# Patient Record
Sex: FEMALE | Race: WHITE | Employment: OTHER | URBAN - METROPOLITAN AREA
[De-identification: names, ages, dates, MRNs, and addresses within clinical notes are randomized per-mention and may not be internally consistent; named-entity substitution may affect disease eponyms.]

---

## 2017-04-19 ENCOUNTER — HOSPITAL ENCOUNTER (OUTPATIENT)
Dept: RADIOLOGY | Facility: HOSPITAL | Age: 72
Discharge: HOME | End: 2017-04-19
Attending: FAMILY MEDICINE
Payer: MEDICARE

## 2017-04-19 ENCOUNTER — OFFICE VISIT (OUTPATIENT)
Dept: FAMILY MEDICINE | Facility: CLINIC | Age: 72
End: 2017-04-19
Payer: MEDICARE

## 2017-04-19 VITALS
DIASTOLIC BLOOD PRESSURE: 72 MMHG | HEART RATE: 88 BPM | BODY MASS INDEX: 32.24 KG/M2 | WEIGHT: 200.63 LBS | OXYGEN SATURATION: 97 % | HEIGHT: 66 IN | SYSTOLIC BLOOD PRESSURE: 124 MMHG | TEMPERATURE: 98 F

## 2017-04-19 DIAGNOSIS — M94.9 DISORDER OF CARTILAGE: ICD-10-CM

## 2017-04-19 DIAGNOSIS — Z12.31 ENCOUNTER FOR SCREENING MAMMOGRAM FOR MALIGNANT NEOPLASM OF BREAST: ICD-10-CM

## 2017-04-19 DIAGNOSIS — I10 ESSENTIAL HYPERTENSION: ICD-10-CM

## 2017-04-19 DIAGNOSIS — E11.69 DM TYPE 2 WITH DIABETIC DYSLIPIDEMIA: ICD-10-CM

## 2017-04-19 DIAGNOSIS — M25.531 WRIST PAIN, ACUTE, RIGHT: ICD-10-CM

## 2017-04-19 DIAGNOSIS — Z13.820 OSTEOPOROSIS SCREENING: ICD-10-CM

## 2017-04-19 DIAGNOSIS — M25.531 WRIST PAIN, ACUTE, RIGHT: Primary | ICD-10-CM

## 2017-04-19 DIAGNOSIS — F51.01 PRIMARY INSOMNIA: ICD-10-CM

## 2017-04-19 DIAGNOSIS — E78.5 DM TYPE 2 WITH DIABETIC DYSLIPIDEMIA: ICD-10-CM

## 2017-04-19 DIAGNOSIS — Z12.39 BREAST CANCER SCREENING: ICD-10-CM

## 2017-04-19 PROCEDURE — 73100 X-RAY EXAM OF WRIST: CPT | Mod: TC,PO,RT

## 2017-04-19 PROCEDURE — 99214 OFFICE O/P EST MOD 30 MIN: CPT | Mod: 25,S$PBB,, | Performed by: FAMILY MEDICINE

## 2017-04-19 PROCEDURE — 99999 PR PBB SHADOW E&M-EST. PATIENT-LVL IV: CPT | Mod: PBBFAC,,, | Performed by: FAMILY MEDICINE

## 2017-04-19 PROCEDURE — 73100 X-RAY EXAM OF WRIST: CPT | Mod: 26,RT,, | Performed by: RADIOLOGY

## 2017-04-19 RX ORDER — ATORVASTATIN CALCIUM 80 MG/1
80 TABLET, FILM COATED ORAL DAILY
Qty: 30 TABLET | Refills: 0 | Status: SHIPPED | OUTPATIENT
Start: 2017-04-19 | End: 2017-06-03 | Stop reason: SDUPTHER

## 2017-04-19 RX ORDER — LORAZEPAM 1 MG/1
1 TABLET ORAL EVERY 6 HOURS PRN
Status: CANCELLED | OUTPATIENT
Start: 2017-04-19

## 2017-04-19 RX ORDER — METFORMIN HYDROCHLORIDE 1000 MG/1
1000 TABLET ORAL 2 TIMES DAILY WITH MEALS
Qty: 60 TABLET | Refills: 0 | Status: SHIPPED | OUTPATIENT
Start: 2017-04-19 | End: 2017-06-03 | Stop reason: SDUPTHER

## 2017-04-19 RX ORDER — ATENOLOL 100 MG/1
100 TABLET ORAL DAILY
COMMUNITY
End: 2017-10-19 | Stop reason: SDUPTHER

## 2017-04-19 RX ORDER — LORAZEPAM 1 MG/1
1 TABLET ORAL NIGHTLY
Qty: 90 TABLET | Refills: 0 | Status: SHIPPED | OUTPATIENT
Start: 2017-04-19 | End: 2017-10-19 | Stop reason: SDUPTHER

## 2017-04-19 RX ORDER — INSULIN ASPART 100 [IU]/ML
INJECTION, SOLUTION INTRAVENOUS; SUBCUTANEOUS
Qty: 1 BOX | Refills: 0 | Status: ON HOLD | OUTPATIENT
Start: 2017-04-19 | End: 2017-08-25

## 2017-04-19 RX ORDER — INSULIN ASPART 100 [IU]/ML
10 INJECTION, SOLUTION INTRAVENOUS; SUBCUTANEOUS
Status: CANCELLED | OUTPATIENT
Start: 2017-04-19

## 2017-04-19 NOTE — PROGRESS NOTES
Subjective:       Patient ID: Tamara Barriga is a 72 y.o. female.    Chief Complaint: Medication Refill    HPI Comments: 72 y old  Female here  To get establish with HTN ,Insomnia   , dlp and type 2 DM with feet neuropathy . Not  On aspirin,  She had PCv13  Last m , tadp less than 10 and maybe zoster    , exercises :  None , Opthalmology  : Seen last nov .  Needs phacoemulsification .  She doesn't check  BS, avoids meat , she al;so felt on outstretched r hand last fall , x rays were neg for fx  then  . Still with pain and limited ROM .  Takes lorazepam to sleep . Current colo, due for dexa and mammo .     Medication Refill       Review of Systems   Constitutional: Negative.    HENT: Negative.    Respiratory: Negative.    Cardiovascular: Negative.    Gastrointestinal: Negative.        Objective:      Physical Exam   Constitutional: She is oriented to person, place, and time. She appears well-developed and well-nourished. No distress.   HENT:   Head: Normocephalic and atraumatic.   Right Ear: External ear normal.   Left Ear: External ear normal.   Mouth/Throat: No oropharyngeal exudate.   Eyes: Conjunctivae and EOM are normal. Pupils are equal, round, and reactive to light. Right eye exhibits no discharge. Left eye exhibits no discharge. No scleral icterus.   Neck: Normal range of motion. Neck supple. No JVD present. No tracheal deviation present. No thyromegaly present.   Cardiovascular: Normal rate, regular rhythm and normal heart sounds.  Exam reveals no gallop and no friction rub.    No murmur heard.  Pulses:       Dorsalis pedis pulses are 2+ on the right side, and 2+ on the left side.        Posterior tibial pulses are 2+ on the right side, and 2+ on the left side.   Pulmonary/Chest: Effort normal and breath sounds normal. No stridor. No respiratory distress. She has no wheezes. She has no rales. She exhibits no tenderness.   Abdominal: Soft. Bowel sounds are normal. She exhibits no distension. There is no  tenderness. There is no rebound and no guarding.   Musculoskeletal:        Right wrist: She exhibits decreased range of motion, tenderness and bony tenderness.        Right foot: There is normal range of motion and no deformity.        Left foot: There is normal range of motion and no deformity.   Feet:   Right Foot:   Protective Sensation: 10 sites tested. 5 sites sensed.   Skin Integrity: Negative for ulcer, blister, skin breakdown, erythema, warmth or callus.   Left Foot:   Protective Sensation: 10 sites tested. 5 sites sensed.   Skin Integrity: Negative for ulcer, blister, skin breakdown, erythema or warmth.   Lymphadenopathy:     She has no cervical adenopathy.   Neurological: She is alert and oriented to person, place, and time.   Skin: Skin is warm and dry. She is not diaphoretic.   Psychiatric: She has a normal mood and affect. Her behavior is normal. Judgment and thought content normal.       Assessment:     Tamara was seen today for medication refill.    Diagnoses and all orders for this visit:    Wrist pain, acute, right  -     X-Ray Wrist 2 View Right; Future    DM type 2 with diabetic dyslipidemia  -     CBC auto differential; Future  -     Comprehensive metabolic panel; Future  -     Hemoglobin A1c; Future  -     Microalbumin/creatinine urine ratio; Future  -     Lipid panel; Future  -     Ambulatory referral to Ophthalmology    Breast cancer screening  -     Mammo Digital Screening Bilateral With CAD; Future    Osteoporosis screening  -     DXA Bone Density Spine And Hip; Future    Disorder of cartilage   -     DXA Bone Density Spine And Hip; Future    Encounter for screening mammogram for malignant neoplasm of breast   -     Mammo Digital Screening Bilateral With CAD; Future    Essential hypertension    Other orders  -     Cancel: insulin detemir (LEVEMIR FLEXTOUCH) 100 unit/mL (3 mL) SubQ InPn pen; Inject 60 Units into the skin.  -     Cancel: insulin aspart (NOVOLOG FLEXPEN) 100 unit/mL InPn pen;  Inject 10 Units into the skin 3 (three) times daily with meals.  -     Cancel: lorazepam (ATIVAN) 1 MG tablet; Take 1 tablet (1 mg total) by mouth every 6 (six) hours as needed for Anxiety.  -     atorvastatin (LIPITOR) 80 MG tablet; Take 1 tablet (80 mg total) by mouth once daily.  -     metformin (GLUCOPHAGE) 1000 MG tablet; Take 1 tablet (1,000 mg total) by mouth 2 (two) times daily with meals.  -     insulin detemir (LEVEMIR FLEXTOUCH) 100 unit/mL (3 mL) SubQ InPn pen; 75 units sq BID  -     insulin aspart (NOVOLOG FLEXPEN) 100 unit/mL InPn pen; 25 units sq  before lunch  -     lorazepam (ATIVAN) 1 MG tablet; Take 1 tablet (1 mg total) by mouth every evening.  -     Hepatitis B Vaccine (Adult) (IM)      Plan:     Tamara was seen today for medication refill.    Diagnoses and all orders for this visit:    DM type 2 with diabetic dyslipidemia    Other orders  -     Cancel: insulin detemir (LEVEMIR FLEXTOUCH) 100 unit/mL (3 mL) SubQ InPn pen; Inject 60 Units into the skin.  -     Cancel: insulin aspart (NOVOLOG FLEXPEN) 100 unit/mL InPn pen; Inject 10 Units into the skin 3 (three) times daily with meals.  -     Cancel: lorazepam (ATIVAN) 1 MG tablet; Take 1 tablet (1 mg total) by mouth every 6 (six) hours as needed for Anxiety.  -     atorvastatin (LIPITOR) 80 MG tablet; Take 1 tablet (80 mg total) by mouth once daily.  -     metformin (GLUCOPHAGE) 1000 MG tablet; Take 1 tablet (1,000 mg total) by mouth 2 (two) times daily with meals.  -     insulin detemir (LEVEMIR FLEXTOUCH) 100 unit/mL (3 mL) SubQ InPn pen; 75 units sq BID  -     insulin aspart (NOVOLOG FLEXPEN) 100 unit/mL InPn pen; 25 units sq  before lunch     Get X rays    Encouraged daily physical activity/exercise for at least 30mins if tolerated.   Weight control recommenced as always.   Discussed how lifestyle changes make biggest impact on diabetes control.   Continue home glucose monitoring once daily and keep log.   Counseling provided today about  hypoglycemia as well as about how diabetes increases risk of cardiovascular, cerebrovascular ,peripheral vascular disease and other serious health complications. He has been instructed to call if developing any new symptoms or hypoglycemia related symptoms.   See encounter for associated orders/medications.   - Lipid panel; Future      Controlled . Cont med

## 2017-05-02 ENCOUNTER — TELEPHONE (OUTPATIENT)
Dept: FAMILY MEDICINE | Facility: CLINIC | Age: 72
End: 2017-05-02

## 2017-05-02 NOTE — TELEPHONE ENCOUNTER
----- Message from Janina You sent at 5/2/2017 12:12 PM CDT -----  states that levemir inj should have been sent in for 3 mths...[phone] (home)   ..  [facility]

## 2017-06-05 RX ORDER — ATORVASTATIN CALCIUM 80 MG/1
80 TABLET, FILM COATED ORAL DAILY
Qty: 30 TABLET | Refills: 0 | Status: SHIPPED | OUTPATIENT
Start: 2017-06-05 | End: 2017-08-11 | Stop reason: SDUPTHER

## 2017-06-05 RX ORDER — METFORMIN HYDROCHLORIDE 1000 MG/1
1000 TABLET ORAL 2 TIMES DAILY WITH MEALS
Qty: 60 TABLET | Refills: 0 | Status: SHIPPED | OUTPATIENT
Start: 2017-06-05 | End: 2017-08-11 | Stop reason: SDUPTHER

## 2017-08-14 RX ORDER — METFORMIN HYDROCHLORIDE 1000 MG/1
TABLET ORAL
Qty: 60 TABLET | Refills: 0 | Status: SHIPPED | OUTPATIENT
Start: 2017-08-14 | End: 2017-10-19 | Stop reason: SDUPTHER

## 2017-08-14 RX ORDER — ATORVASTATIN CALCIUM 80 MG/1
TABLET, FILM COATED ORAL
Qty: 30 TABLET | Refills: 0 | Status: SHIPPED | OUTPATIENT
Start: 2017-08-14 | End: 2017-10-19 | Stop reason: SDUPTHER

## 2017-08-22 ENCOUNTER — HOSPITAL ENCOUNTER (INPATIENT)
Facility: HOSPITAL | Age: 72
LOS: 2 days | Discharge: HOME HEALTH | DRG: 854 | End: 2017-08-25
Attending: EMERGENCY MEDICINE | Admitting: INTERNAL MEDICINE
Payer: MEDICARE

## 2017-08-22 ENCOUNTER — HOSPITAL ENCOUNTER (OUTPATIENT)
Dept: RADIOLOGY | Facility: HOSPITAL | Age: 72
Discharge: HOME | DRG: 854 | End: 2017-08-22
Attending: FAMILY MEDICINE
Payer: MEDICARE

## 2017-08-22 ENCOUNTER — OFFICE VISIT (OUTPATIENT)
Dept: FAMILY MEDICINE | Facility: CLINIC | Age: 72
DRG: 854 | End: 2017-08-22
Payer: MEDICARE

## 2017-08-22 ENCOUNTER — TELEPHONE (OUTPATIENT)
Dept: FAMILY MEDICINE | Facility: CLINIC | Age: 72
End: 2017-08-22

## 2017-08-22 VITALS
SYSTOLIC BLOOD PRESSURE: 138 MMHG | TEMPERATURE: 99 F | OXYGEN SATURATION: 98 % | HEIGHT: 66 IN | HEART RATE: 99 BPM | WEIGHT: 193.81 LBS | DIASTOLIC BLOOD PRESSURE: 86 MMHG | BODY MASS INDEX: 31.15 KG/M2

## 2017-08-22 DIAGNOSIS — E08.40 DIABETES MELLITUS DUE TO UNDERLYING CONDITION WITH DIABETIC NEUROPATHY, WITH LONG-TERM CURRENT USE OF INSULIN: ICD-10-CM

## 2017-08-22 DIAGNOSIS — A41.9 SEPSIS: ICD-10-CM

## 2017-08-22 DIAGNOSIS — L03.311 ABDOMINAL WALL CELLULITIS: ICD-10-CM

## 2017-08-22 DIAGNOSIS — R30.0 DYSURIA: Primary | ICD-10-CM

## 2017-08-22 DIAGNOSIS — Z79.4 DIABETES MELLITUS DUE TO UNDERLYING CONDITION WITH DIABETIC NEUROPATHY, WITH LONG-TERM CURRENT USE OF INSULIN: ICD-10-CM

## 2017-08-22 DIAGNOSIS — Z79.4 TYPE 2 DIABETES MELLITUS WITH HYPERGLYCEMIA, WITH LONG-TERM CURRENT USE OF INSULIN: ICD-10-CM

## 2017-08-22 DIAGNOSIS — S92.354A CLOSED NONDISPLACED FRACTURE OF FIFTH METATARSAL BONE OF RIGHT FOOT, INITIAL ENCOUNTER: Primary | ICD-10-CM

## 2017-08-22 DIAGNOSIS — A41.9 SEPSIS, DUE TO UNSPECIFIED ORGANISM: ICD-10-CM

## 2017-08-22 DIAGNOSIS — M79.671 RIGHT FOOT PAIN: ICD-10-CM

## 2017-08-22 DIAGNOSIS — E11.65 TYPE 2 DIABETES MELLITUS WITH HYPERGLYCEMIA, UNSPECIFIED LONG TERM INSULIN USE STATUS: ICD-10-CM

## 2017-08-22 DIAGNOSIS — L02.211 ABDOMINAL WALL ABSCESS: Primary | ICD-10-CM

## 2017-08-22 DIAGNOSIS — E11.65 TYPE 2 DIABETES MELLITUS WITH HYPERGLYCEMIA, WITH LONG-TERM CURRENT USE OF INSULIN: ICD-10-CM

## 2017-08-22 LAB
BILIRUB SERPL-MCNC: ABNORMAL MG/DL
BLOOD URINE, POC: ABNORMAL
COLOR, POC UA: ABNORMAL
GLUCOSE UR QL STRIP: 1000
KETONES UR QL STRIP: ABNORMAL
LEUKOCYTE ESTERASE URINE, POC: ABNORMAL
NITRITE, POC UA: ABNORMAL
PH, POC UA: 5
PROTEIN, POC: ABNORMAL
SPECIFIC GRAVITY, POC UA: 1
UROBILINOGEN, POC UA: ABNORMAL

## 2017-08-22 PROCEDURE — 99285 EMERGENCY DEPT VISIT HI MDM: CPT | Mod: 25,27

## 2017-08-22 PROCEDURE — 1125F AMNT PAIN NOTED PAIN PRSNT: CPT | Mod: ,,, | Performed by: FAMILY MEDICINE

## 2017-08-22 PROCEDURE — 99999 PR PBB SHADOW E&M-EST. PATIENT-LVL IV: CPT | Mod: PBBFAC,,, | Performed by: FAMILY MEDICINE

## 2017-08-22 PROCEDURE — 73630 X-RAY EXAM OF FOOT: CPT | Mod: 26,RT,, | Performed by: RADIOLOGY

## 2017-08-22 PROCEDURE — 82962 GLUCOSE BLOOD TEST: CPT

## 2017-08-22 PROCEDURE — 3075F SYST BP GE 130 - 139MM HG: CPT | Mod: ,,, | Performed by: FAMILY MEDICINE

## 2017-08-22 PROCEDURE — 99214 OFFICE O/P EST MOD 30 MIN: CPT | Mod: S$PBB,,, | Performed by: FAMILY MEDICINE

## 2017-08-22 PROCEDURE — 3079F DIAST BP 80-89 MM HG: CPT | Mod: ,,, | Performed by: FAMILY MEDICINE

## 2017-08-22 PROCEDURE — 0J980ZZ DRAINAGE OF ABDOMEN SUBCUTANEOUS TISSUE AND FASCIA, OPEN APPROACH: ICD-10-PCS | Performed by: EMERGENCY MEDICINE

## 2017-08-22 PROCEDURE — 1159F MED LIST DOCD IN RCRD: CPT | Mod: ,,, | Performed by: FAMILY MEDICINE

## 2017-08-22 RX ORDER — LIDOCAINE HYDROCHLORIDE 10 MG/ML
10 INJECTION INFILTRATION; PERINEURAL
Status: COMPLETED | OUTPATIENT
Start: 2017-08-23 | End: 2017-08-23

## 2017-08-22 RX ORDER — HYDROMORPHONE HYDROCHLORIDE 2 MG/ML
1 INJECTION, SOLUTION INTRAMUSCULAR; INTRAVENOUS; SUBCUTANEOUS ONCE
Status: COMPLETED | OUTPATIENT
Start: 2017-08-23 | End: 2017-08-23

## 2017-08-22 RX ORDER — SULFAMETHOXAZOLE AND TRIMETHOPRIM 800; 160 MG/1; MG/1
1 TABLET ORAL 2 TIMES DAILY
Qty: 20 TABLET | Refills: 0 | Status: ON HOLD | OUTPATIENT
Start: 2017-08-22 | End: 2017-08-25

## 2017-08-22 RX ORDER — HYDROCODONE BITARTRATE AND ACETAMINOPHEN 5; 325 MG/1; MG/1
1 TABLET ORAL EVERY 6 HOURS PRN
Qty: 12 TABLET | Refills: 0 | Status: SHIPPED | OUTPATIENT
Start: 2017-08-22 | End: 2017-10-19 | Stop reason: ALTCHOICE

## 2017-08-23 PROBLEM — S92.501A CLOSED FRACTURE OF PHALANX OF RIGHT FIFTH TOE: Status: ACTIVE | Noted: 2017-08-23

## 2017-08-23 PROBLEM — R00.0 TACHYCARDIA: Status: ACTIVE | Noted: 2017-08-23

## 2017-08-23 PROBLEM — E11.65 TYPE 2 DIABETES MELLITUS WITH HYPERGLYCEMIA: Status: ACTIVE | Noted: 2017-08-23

## 2017-08-23 PROBLEM — A41.9 SEPSIS: Status: ACTIVE | Noted: 2017-08-23

## 2017-08-23 PROBLEM — L02.211 ABDOMINAL WALL ABSCESS: Status: ACTIVE | Noted: 2017-08-23

## 2017-08-23 LAB
ALBUMIN SERPL BCP-MCNC: 3.1 G/DL
ALP SERPL-CCNC: 112 U/L
ALT SERPL W/O P-5'-P-CCNC: 11 U/L
ANION GAP SERPL CALC-SCNC: 13 MMOL/L
AST SERPL-CCNC: 14 U/L
B-OH-BUTYR BLD STRIP-SCNC: 0.1 MMOL/L
BACTERIA #/AREA URNS HPF: NORMAL /HPF
BACTERIA UR CULT: NO GROWTH
BASOPHILS # BLD AUTO: 0.02 K/UL
BASOPHILS NFR BLD: 0.2 %
BILIRUB SERPL-MCNC: 0.6 MG/DL
BILIRUB UR QL STRIP: NEGATIVE
BUN SERPL-MCNC: 14 MG/DL
CALCIUM SERPL-MCNC: 10 MG/DL
CHLORIDE SERPL-SCNC: 100 MMOL/L
CLARITY UR: CLEAR
CO2 SERPL-SCNC: 25 MMOL/L
COLOR UR: YELLOW
CREAT SERPL-MCNC: 1 MG/DL
DIFFERENTIAL METHOD: ABNORMAL
EOSINOPHIL # BLD AUTO: 0 K/UL
EOSINOPHIL NFR BLD: 0.3 %
ERYTHROCYTE [DISTWIDTH] IN BLOOD BY AUTOMATED COUNT: 13.1 %
EST. GFR  (AFRICAN AMERICAN): >60 ML/MIN/1.73 M^2
EST. GFR  (NON AFRICAN AMERICAN): 57 ML/MIN/1.73 M^2
GLUCOSE SERPL-MCNC: 444 MG/DL
GLUCOSE UR QL STRIP: ABNORMAL
HCT VFR BLD AUTO: 38.2 %
HGB BLD-MCNC: 13 G/DL
HGB UR QL STRIP: NEGATIVE
KETONES UR QL STRIP: NEGATIVE
LACTATE SERPL-SCNC: 1.9 MMOL/L
LEUKOCYTE ESTERASE UR QL STRIP: ABNORMAL
LYMPHOCYTES # BLD AUTO: 2.7 K/UL
LYMPHOCYTES NFR BLD: 23.1 %
MCH RBC QN AUTO: 29.4 PG
MCHC RBC AUTO-ENTMCNC: 34 G/DL
MCV RBC AUTO: 86 FL
MICROSCOPIC COMMENT: NORMAL
MONOCYTES # BLD AUTO: 0.9 K/UL
MONOCYTES NFR BLD: 7.3 %
NEUTROPHILS # BLD AUTO: 8.1 K/UL
NEUTROPHILS NFR BLD: 69.1 %
NITRITE UR QL STRIP: NEGATIVE
PH UR STRIP: 6 [PH] (ref 5–8)
PLATELET # BLD AUTO: 273 K/UL
PMV BLD AUTO: 10.9 FL
POCT GLUCOSE: 325 MG/DL (ref 70–110)
POCT GLUCOSE: 389 MG/DL (ref 70–110)
POCT GLUCOSE: 395 MG/DL (ref 70–110)
POCT GLUCOSE: 399 MG/DL (ref 70–110)
POTASSIUM SERPL-SCNC: 4.1 MMOL/L
PROT SERPL-MCNC: 7 G/DL
PROT UR QL STRIP: NEGATIVE
RBC # BLD AUTO: 4.42 M/UL
RBC #/AREA URNS HPF: 1 /HPF (ref 0–4)
SODIUM SERPL-SCNC: 138 MMOL/L
SP GR UR STRIP: 1.02 (ref 1–1.03)
SQUAMOUS #/AREA URNS HPF: 3 /HPF
URN SPEC COLLECT METH UR: ABNORMAL
UROBILINOGEN UR STRIP-ACNC: NEGATIVE EU/DL
WBC # BLD AUTO: 11.71 K/UL
WBC #/AREA URNS HPF: 4 /HPF (ref 0–5)

## 2017-08-23 PROCEDURE — 96375 TX/PRO/DX INJ NEW DRUG ADDON: CPT

## 2017-08-23 PROCEDURE — 83605 ASSAY OF LACTIC ACID: CPT

## 2017-08-23 PROCEDURE — 82010 KETONE BODYS QUAN: CPT

## 2017-08-23 PROCEDURE — 87040 BLOOD CULTURE FOR BACTERIA: CPT | Mod: 59

## 2017-08-23 PROCEDURE — 11000001 HC ACUTE MED/SURG PRIVATE ROOM

## 2017-08-23 PROCEDURE — 25000003 PHARM REV CODE 250: Performed by: EMERGENCY MEDICINE

## 2017-08-23 PROCEDURE — 10060 I&D ABSCESS SIMPLE/SINGLE: CPT

## 2017-08-23 PROCEDURE — 81000 URINALYSIS NONAUTO W/SCOPE: CPT

## 2017-08-23 PROCEDURE — 25000003 PHARM REV CODE 250: Performed by: INTERNAL MEDICINE

## 2017-08-23 PROCEDURE — 63600175 PHARM REV CODE 636 W HCPCS: Performed by: EMERGENCY MEDICINE

## 2017-08-23 PROCEDURE — 96366 THER/PROPH/DIAG IV INF ADDON: CPT

## 2017-08-23 PROCEDURE — 63600175 PHARM REV CODE 636 W HCPCS: Performed by: INTERNAL MEDICINE

## 2017-08-23 PROCEDURE — 87070 CULTURE OTHR SPECIMN AEROBIC: CPT

## 2017-08-23 PROCEDURE — 80053 COMPREHEN METABOLIC PANEL: CPT

## 2017-08-23 PROCEDURE — 96365 THER/PROPH/DIAG IV INF INIT: CPT

## 2017-08-23 PROCEDURE — 85025 COMPLETE CBC W/AUTO DIFF WBC: CPT

## 2017-08-23 PROCEDURE — 96367 TX/PROPH/DG ADDL SEQ IV INF: CPT

## 2017-08-23 PROCEDURE — 99221 1ST HOSP IP/OBS SF/LOW 40: CPT | Mod: ,,, | Performed by: PHYSICIAN ASSISTANT

## 2017-08-23 PROCEDURE — 82962 GLUCOSE BLOOD TEST: CPT

## 2017-08-23 PROCEDURE — 96372 THER/PROPH/DIAG INJ SC/IM: CPT

## 2017-08-23 RX ORDER — SODIUM CHLORIDE 9 MG/ML
INJECTION, SOLUTION INTRAVENOUS CONTINUOUS
Status: DISCONTINUED | OUTPATIENT
Start: 2017-08-23 | End: 2017-08-25 | Stop reason: HOSPADM

## 2017-08-23 RX ORDER — METFORMIN HYDROCHLORIDE 500 MG/1
1000 TABLET ORAL 2 TIMES DAILY WITH MEALS
Status: DISCONTINUED | OUTPATIENT
Start: 2017-08-23 | End: 2017-08-23

## 2017-08-23 RX ORDER — CEFEPIME HYDROCHLORIDE 2 G/50ML
2 INJECTION, SOLUTION INTRAVENOUS
Status: DISCONTINUED | OUTPATIENT
Start: 2017-08-23 | End: 2017-08-25 | Stop reason: HOSPADM

## 2017-08-23 RX ORDER — LORAZEPAM 0.5 MG/1
1 TABLET ORAL NIGHTLY
Status: DISCONTINUED | OUTPATIENT
Start: 2017-08-23 | End: 2017-08-25 | Stop reason: HOSPADM

## 2017-08-23 RX ORDER — ATORVASTATIN CALCIUM 40 MG/1
80 TABLET, FILM COATED ORAL DAILY
Status: DISCONTINUED | OUTPATIENT
Start: 2017-08-23 | End: 2017-08-25 | Stop reason: HOSPADM

## 2017-08-23 RX ORDER — HYDROCHLOROTHIAZIDE 12.5 MG/1
12.5 TABLET ORAL
Status: DISCONTINUED | OUTPATIENT
Start: 2017-08-23 | End: 2017-08-23

## 2017-08-23 RX ORDER — HYDROCODONE BITARTRATE AND ACETAMINOPHEN 5; 325 MG/1; MG/1
1 TABLET ORAL EVERY 6 HOURS PRN
Status: DISCONTINUED | OUTPATIENT
Start: 2017-08-23 | End: 2017-08-23

## 2017-08-23 RX ORDER — IBUPROFEN 200 MG
16 TABLET ORAL
Status: DISCONTINUED | OUTPATIENT
Start: 2017-08-23 | End: 2017-08-25 | Stop reason: HOSPADM

## 2017-08-23 RX ORDER — GLUCAGON 1 MG
1 KIT INJECTION
Status: DISCONTINUED | OUTPATIENT
Start: 2017-08-23 | End: 2017-08-25 | Stop reason: HOSPADM

## 2017-08-23 RX ORDER — CEFEPIME HYDROCHLORIDE 2 G/50ML
2 INJECTION, SOLUTION INTRAVENOUS
Status: COMPLETED | OUTPATIENT
Start: 2017-08-23 | End: 2017-08-23

## 2017-08-23 RX ORDER — HYDROCODONE BITARTRATE AND ACETAMINOPHEN 5; 325 MG/1; MG/1
1 TABLET ORAL EVERY 4 HOURS PRN
Status: DISCONTINUED | OUTPATIENT
Start: 2017-08-23 | End: 2017-08-25 | Stop reason: HOSPADM

## 2017-08-23 RX ORDER — IBUPROFEN 200 MG
24 TABLET ORAL
Status: DISCONTINUED | OUTPATIENT
Start: 2017-08-23 | End: 2017-08-25 | Stop reason: HOSPADM

## 2017-08-23 RX ORDER — IPRATROPIUM BROMIDE AND ALBUTEROL SULFATE 2.5; .5 MG/3ML; MG/3ML
3 SOLUTION RESPIRATORY (INHALATION) EVERY 4 HOURS PRN
Status: DISCONTINUED | OUTPATIENT
Start: 2017-08-23 | End: 2017-08-25 | Stop reason: HOSPADM

## 2017-08-23 RX ORDER — HYDROCHLOROTHIAZIDE 12.5 MG/1
12.5 TABLET ORAL DAILY
Status: DISCONTINUED | OUTPATIENT
Start: 2017-08-23 | End: 2017-08-24

## 2017-08-23 RX ORDER — DULOXETIN HYDROCHLORIDE 30 MG/1
60 CAPSULE, DELAYED RELEASE ORAL DAILY
Status: DISCONTINUED | OUTPATIENT
Start: 2017-08-23 | End: 2017-08-25 | Stop reason: HOSPADM

## 2017-08-23 RX ORDER — INSULIN ASPART 100 [IU]/ML
1-10 INJECTION, SOLUTION INTRAVENOUS; SUBCUTANEOUS
Status: DISCONTINUED | OUTPATIENT
Start: 2017-08-23 | End: 2017-08-25 | Stop reason: HOSPADM

## 2017-08-23 RX ORDER — ATENOLOL 50 MG/1
100 TABLET ORAL DAILY
Status: DISCONTINUED | OUTPATIENT
Start: 2017-08-23 | End: 2017-08-25 | Stop reason: HOSPADM

## 2017-08-23 RX ORDER — OXYCODONE AND ACETAMINOPHEN 10; 325 MG/1; MG/1
1 TABLET ORAL EVERY 4 HOURS PRN
Status: DISCONTINUED | OUTPATIENT
Start: 2017-08-23 | End: 2017-08-25 | Stop reason: HOSPADM

## 2017-08-23 RX ORDER — MAG HYDROX/ALUMINUM HYD/SIMETH 200-200-20
30 SUSPENSION, ORAL (FINAL DOSE FORM) ORAL EVERY 6 HOURS PRN
Status: DISCONTINUED | OUTPATIENT
Start: 2017-08-23 | End: 2017-08-25 | Stop reason: HOSPADM

## 2017-08-23 RX ORDER — GABAPENTIN 300 MG/1
300 CAPSULE ORAL NIGHTLY
COMMUNITY
End: 2017-10-19 | Stop reason: SDUPTHER

## 2017-08-23 RX ADMIN — HYDROCHLOROTHIAZIDE 12.5 MG: 12.5 TABLET ORAL at 08:08

## 2017-08-23 RX ADMIN — INSULIN DETEMIR 75 UNITS: 100 INJECTION, SOLUTION SUBCUTANEOUS at 10:08

## 2017-08-23 RX ADMIN — SODIUM CHLORIDE: 0.9 INJECTION, SOLUTION INTRAVENOUS at 02:08

## 2017-08-23 RX ADMIN — CEFEPIME HYDROCHLORIDE 2 G: 2 INJECTION, SOLUTION INTRAVENOUS at 02:08

## 2017-08-23 RX ADMIN — HYDROMORPHONE HYDROCHLORIDE 1 MG: 2 INJECTION, SOLUTION INTRAMUSCULAR; INTRAVENOUS; SUBCUTANEOUS at 12:08

## 2017-08-23 RX ADMIN — INSULIN ASPART 4 UNITS: 100 INJECTION, SOLUTION INTRAVENOUS; SUBCUTANEOUS at 08:08

## 2017-08-23 RX ADMIN — SODIUM CHLORIDE: 0.9 INJECTION, SOLUTION INTRAVENOUS at 04:08

## 2017-08-23 RX ADMIN — VANCOMYCIN HYDROCHLORIDE 1250 MG: 100 INJECTION, POWDER, LYOPHILIZED, FOR SOLUTION INTRAVENOUS at 12:08

## 2017-08-23 RX ADMIN — LORAZEPAM 1 MG: 0.5 TABLET ORAL at 08:08

## 2017-08-23 RX ADMIN — SODIUM CHLORIDE 2500 ML: 0.9 INJECTION, SOLUTION INTRAVENOUS at 12:08

## 2017-08-23 RX ADMIN — HYDROCODONE BITARTRATE AND ACETAMINOPHEN 1 TABLET: 5; 325 TABLET ORAL at 07:08

## 2017-08-23 RX ADMIN — INSULIN DETEMIR 75 UNITS: 100 INJECTION, SOLUTION SUBCUTANEOUS at 08:08

## 2017-08-23 RX ADMIN — INSULIN ASPART 8 UNITS: 100 INJECTION, SOLUTION INTRAVENOUS; SUBCUTANEOUS at 11:08

## 2017-08-23 RX ADMIN — VANCOMYCIN HYDROCHLORIDE 1250 MG: 100 INJECTION, POWDER, LYOPHILIZED, FOR SOLUTION INTRAVENOUS at 07:08

## 2017-08-23 RX ADMIN — ATORVASTATIN CALCIUM 80 MG: 40 TABLET, FILM COATED ORAL at 08:08

## 2017-08-23 RX ADMIN — HYDROCODONE BITARTRATE AND ACETAMINOPHEN 1 TABLET: 5; 325 TABLET ORAL at 10:08

## 2017-08-23 RX ADMIN — INSULIN ASPART 6 UNITS: 100 INJECTION, SOLUTION INTRAVENOUS; SUBCUTANEOUS at 05:08

## 2017-08-23 RX ADMIN — DULOXETINE 60 MG: 30 CAPSULE, DELAYED RELEASE ORAL at 08:08

## 2017-08-23 RX ADMIN — LIDOCAINE HYDROCHLORIDE 10 ML: 10 INJECTION, SOLUTION INFILTRATION; PERINEURAL at 12:08

## 2017-08-23 RX ADMIN — INSULIN ASPART 10 UNITS: 100 INJECTION, SOLUTION INTRAVENOUS; SUBCUTANEOUS at 07:08

## 2017-08-23 RX ADMIN — ATENOLOL 100 MG: 50 TABLET ORAL at 08:08

## 2017-08-23 RX ADMIN — HYDROCODONE BITARTRATE AND ACETAMINOPHEN 1 TABLET: 5; 325 TABLET ORAL at 05:08

## 2017-08-23 NOTE — PLAN OF CARE
Pt remains free of injuries. C/o RLQ abd pain, throbbing, 2-6/10. Moderately controlled c PRN meds and relaxation techniques. Moderate amount of serosanguinous drainage from I/D site. Blood glucose monitored and insulin per SS. IV antibiotics and IVF per MD orders. 12 hr chart check completed. Will continue to monitor.

## 2017-08-23 NOTE — H&P
"[facility] Hospital Medicine  History & Physical    Patient Name: Tamara Barriga  MRN: [id number]  Admission Date: 8/22/2017  Attending Physician: Nacho Coates MD  Primary Care Provider: Primary Doctor No         Patient information was obtained from patient and ER records.     Subjective:     Principal Problem:Sepsis    Chief Complaint:   Chief Complaint   Patient presents with    Abscess     seen today by PCP. labs and ct ordered for tomorrow. reports she hasnt been able to get prescription for pain medicine or antibotics. increased pain and discomfot to right lower abd where celluitis is.         HPI: Ms. Barriga is a 73 y/o  female with h/o HTN, T2DM insulin dependant, has been suffering with a right abdominal wall redness/abscess for the past one week. She sought medical attention 2 days ago, was prescribed bactrim, but patient hasn't gotten it filled and did not take the antibiotic yet. Today she presented to the ED with fever, chills, worsening right abdominal wall swelling. I&D was performed int he ED, large amount of purulent drainage per . Wound and blood cultures obtained. Temp 101, , WBC 11.7. Patient received NS 30 ml/kg IV bolus int he ED. Lactic acid 1.0. Started on NS at 125 cc/hr. Glucose elevated in 400's, has not taken insulin in 2 days ("I ran out of my insulin"). HbA1C 10.5.    Also c/o right foot pain, XR shows acute obliquely oriented fracture involving the distal shaft/neck of the 5th metatarsal.    Admitted for sepsis due to right abdominal wall abscess. Received Vanc/Cefepime in the ED.    Past Medical History:   Diagnosis Date    Anxiety     Diabetes mellitus, type 2     Hyperlipidemia     Hypertension     Insomnia        Past Surgical History:   Procedure Laterality Date    BACK SURGERY      BREAST SURGERY      HYSTERECTOMY      LIVER BIOPSY         Review of patient's allergies indicates:  No Known Allergies    No current " facility-administered medications on file prior to encounter.      Current Outpatient Prescriptions on File Prior to Encounter   Medication Sig    atenolol (TENORMIN) 100 MG tablet Take 100 mg by mouth once daily.    atorvastatin (LIPITOR) 80 MG tablet TAKE 1 TABLET BY MOUTH EVERY DAY    duloxetine (CYMBALTA) 60 MG capsule Take 60 mg by mouth once daily.    hydrocodone-acetaminophen 5-325mg (NORCO) 5-325 mg per tablet Take 1 tablet by mouth every 6 (six) hours as needed for Pain.    insulin aspart (NOVOLOG FLEXPEN) 100 unit/mL InPn pen 25 units sq  before lunch    insulin detemir (LEVEMIR FLEXTOUCH) 100 unit/mL (3 mL) SubQ InPn pen 75 units sq BID    lorazepam (ATIVAN) 1 MG tablet Take 1 tablet (1 mg total) by mouth every evening.    metformin (GLUCOPHAGE) 1000 MG tablet TAKE 1 TABLET BY MOUTH TWICE A DAY WITH MEALS    sulfamethoxazole-trimethoprim 800-160mg (BACTRIM DS) 800-160 mg Tab Take 1 tablet by mouth 2 (two) times daily.    telmisartan-hydrochlorothiazide (MICARDIS HCT) 40-12.5 mg per tablet Take 1 tablet by mouth once daily.     Family History     Problem Relation (Age of Onset)    Cancer Daughter        Social History Main Topics    Smoking status: Never Smoker    Smokeless tobacco: Never Used    Alcohol use 0.0 - 0.6 oz/week    Drug use: No    Sexual activity: No     Review of Systems   Constitutional: Positive for chills, fatigue and fever. Negative for diaphoresis.   HENT: Negative.  Negative for congestion, nosebleeds and sinus pressure.    Eyes: Negative.  Negative for photophobia, redness and visual disturbance.   Respiratory: Negative.  Negative for cough, chest tightness, shortness of breath and wheezing.    Cardiovascular: Negative.  Negative for chest pain, palpitations and leg swelling.   Gastrointestinal: Positive for abdominal pain (right lower abdominal wall). Negative for diarrhea, nausea and vomiting.   Endocrine: Negative.  Negative for polydipsia, polyphagia and polyuria.    Genitourinary: Negative.  Negative for dysuria, flank pain, frequency and urgency.   Musculoskeletal: Negative.  Negative for back pain, joint swelling and neck stiffness.        Right distal lateral foot pain   Skin: Negative.  Negative for color change, pallor and rash.   Allergic/Immunologic: Negative.  Negative for environmental allergies, food allergies and immunocompromised state.   Neurological: Negative.  Negative for dizziness, syncope, speech difficulty, numbness and headaches.   Hematological: Negative.  Negative for adenopathy. Does not bruise/bleed easily.   Psychiatric/Behavioral: Negative.  Negative for confusion, decreased concentration and hallucinations. The patient is not nervous/anxious.    All other systems reviewed and are negative.    Objective:     Vital Signs (Most Recent):  Temp: (!) 100.5 °F (38.1 °C) (08/23/17 0428)  Pulse: 87 (08/23/17 0428)  Resp: 18 (08/23/17 0428)  BP: (!) 141/66 (08/23/17 0428)  SpO2: 98 % (08/23/17 0428) Vital Signs (24h Range):  Temp:  [98.2 °F (36.8 °C)-101.6 °F (38.7 °C)] 100.5 °F (38.1 °C)  Pulse:  [] 87  Resp:  [16-19] 18  SpO2:  [95 %-99 %] 98 %  BP: (131-179)/(56-86) 141/66     Weight: 83.9 kg (185 lb)  Body mass index is 29.86 kg/m².    Physical Exam   Constitutional: She is oriented to person, place, and time. She appears well-developed and well-nourished. No distress.   HENT:   Head: Normocephalic and atraumatic.   Eyes: Conjunctivae and EOM are normal. No scleral icterus.   Neck: Normal range of motion. Neck supple. No JVD present. No tracheal deviation present. No thyromegaly present.   Cardiovascular: Regular rhythm, normal heart sounds and intact distal pulses.  Tachycardia present.    No murmur heard.  Pulmonary/Chest: Effort normal and breath sounds normal. No respiratory distress. She has no wheezes. She has no rales. She exhibits no tenderness.   Abdominal: Soft. Bowel sounds are normal. She exhibits no distension. There is tenderness.    Large area of right lower abdominal wall induration noted, with I&D done, covered with blood soaked bandage.   Musculoskeletal: Normal range of motion. She exhibits tenderness (right little toe area). She exhibits no edema.   Lymphadenopathy:     She has no cervical adenopathy.   Neurological: She is alert and oriented to person, place, and time. No cranial nerve deficit. She exhibits normal muscle tone. Coordination normal.   Skin: Skin is warm and dry. She is not diaphoretic. No erythema.   Psychiatric: She has a normal mood and affect. Her behavior is normal. Judgment and thought content normal.   Nursing note and vitals reviewed.       Significant Labs:   Bilirubin:   Recent Labs  Lab 08/22/17  1626 08/23/17  0000   BILITOT 0.6 0.6     Blood Culture: No results for input(s): LABBLOO in the last 48 hours.  BMP:   Recent Labs  Lab 08/23/17  0000   *      K 4.1      CO2 25   BUN 14   CREATININE 1.0   CALCIUM 10.0     CBC:   Recent Labs  Lab 08/22/17  1626 08/23/17  0000   WBC 12.40 11.71   HGB 13.5 13.0   HCT 38.9 38.2    273     CMP:   Recent Labs  Lab 08/22/17  1626 08/23/17  0000    138   K 3.9 4.1   CL 99 100   CO2 28 25   * 444*   BUN 11 14   CREATININE 1.1 1.0   CALCIUM 10.3 10.0   PROT 7.6 7.0   ALBUMIN 3.4* 3.1*   BILITOT 0.6 0.6   ALKPHOS 127 112   AST 8* 14   ALT 12 11   ANIONGAP 12 13   EGFRNONAA 51.0* 57*     Cardiac Markers: No results for input(s): CKMB, MYOGLOBIN, BNP, TROPISTAT in the last 48 hours.  Lactic Acid:   Recent Labs  Lab 08/23/17  0035   LACTATE 1.9     Troponin: No results for input(s): TROPONINI in the last 48 hours.  Urine Studies:   Recent Labs  Lab 08/22/17  1637   COLORU yellow/hazy   PHUR 5   SPECGRAV 1.005   KETONESU neg   NITRITE neg   UROBILINOGEN norm     All pertinent labs within the past 24 hours have been reviewed.    Significant Imaging: I have reviewed and interpreted all pertinent imaging results/findings within the past 24 hours.      Imaging Results    None           Assessment/Plan:     * Sepsis    Temp 101,  with abdominal wall abscess  IV Vanc and IV cefepime  Wound and blood cultures  IV fluids  Monitor  Labs in AM          Abdominal wall abscess    s/p I&D in the ED.  Follow up with wound and blood cultures.  Continue IV cefepime, Vancomycin  Surgery consult          Type 2 diabetes mellitus with hyperglycemia    Uncontrolled, has not taken insulin in 2 days  HbA1C 10.5, Current BG in 400's  Resume home dose Levemir 75 units BID  Moderate dose ISS            Essential hypertension    Resume home medications.  Well controlled at this time.          Closed fracture of phalanx of right fifth toe    Supportive care  Orthopedics consult            VTE Risk Mitigation         Ordered     Place sequential compression device  Until discontinued      08/23/17 0438     Low Risk of VTE  Once      08/23/17 0426             Nacho Coates MD  Department of Hospital Medicine   [facility]

## 2017-08-23 NOTE — CONSULTS
[facility]   General Surgery  Consult Note    Patient Name: Tamara Barriga  MRN: [id number]  Code Status: Full Code  Admission Date: 8/22/2017  Hospital Length of Stay: 0 days  Attending Physician: Kiran Kimble, *  Primary Care Provider: Primary Doctor No    Patient information was obtained from patient and ER records.     Inpatient consult to General Surgery  Consult performed by: DAVE DUONG  Consult ordered by: MACARENA MILLS        Subjective:     Principal Problem: Sepsis    History of Present Illness: Tamara Barriga is a 72 y.o. type 2 diabetic female who presented to the ED last night with a large abscess of her abdominal wall. The problem started about 1 week ago and quickly worsened. She c/o warmth, firmness, and tenderness of abdominal wall, fever, chills.  I&D was done in ED.    No current facility-administered medications on file prior to encounter.      Current Outpatient Prescriptions on File Prior to Encounter   Medication Sig    atenolol (TENORMIN) 100 MG tablet Take 100 mg by mouth once daily.    atorvastatin (LIPITOR) 80 MG tablet TAKE 1 TABLET BY MOUTH EVERY DAY    duloxetine (CYMBALTA) 60 MG capsule Take 60 mg by mouth 2 (two) times daily.     insulin detemir (LEVEMIR FLEXTOUCH) 100 unit/mL (3 mL) SubQ InPn pen 75 units sq BID    lorazepam (ATIVAN) 1 MG tablet Take 1 tablet (1 mg total) by mouth every evening.    metformin (GLUCOPHAGE) 1000 MG tablet TAKE 1 TABLET BY MOUTH TWICE A DAY WITH MEALS    telmisartan-hydrochlorothiazide (MICARDIS HCT) 40-12.5 mg per tablet Take 1 tablet by mouth once daily.    hydrocodone-acetaminophen 5-325mg (NORCO) 5-325 mg per tablet Take 1 tablet by mouth every 6 (six) hours as needed for Pain.    insulin aspart (NOVOLOG FLEXPEN) 100 unit/mL InPn pen 25 units sq  before lunch    sulfamethoxazole-trimethoprim 800-160mg (BACTRIM DS) 800-160 mg Tab Take 1 tablet by mouth 2 (two) times daily.       Review of patient's  allergies indicates:  No Known Allergies    Past Medical History:   Diagnosis Date    Anxiety     Diabetes mellitus, type 2     Hyperlipidemia     Hypertension     Insomnia      Past Surgical History:   Procedure Laterality Date    BACK SURGERY      BREAST SURGERY      HYSTERECTOMY      LIVER BIOPSY       Family History     Problem Relation (Age of Onset)    Cancer Daughter        Social History Main Topics    Smoking status: Never Smoker    Smokeless tobacco: Never Used    Alcohol use 0.0 - 0.6 oz/week    Drug use: No    Sexual activity: No     Review of Systems   Constitutional: Positive for chills and fever. Negative for activity change.   Respiratory: Negative for shortness of breath.    Cardiovascular: Negative for chest pain.   Gastrointestinal: Positive for abdominal pain. Negative for nausea and vomiting.   Genitourinary: Negative for dysuria.   Musculoskeletal: Negative for myalgias.   Skin: Negative for wound.   Neurological: Negative for weakness.   Hematological: Does not bruise/bleed easily.   Psychiatric/Behavioral: The patient is not nervous/anxious.      Objective:     Vital Signs (Most Recent):  Temp: 99.4 °F (37.4 °C) (08/23/17 0834)  Pulse: 92 (08/23/17 0834)  Resp: 18 (08/23/17 0834)  BP: (!) 142/66 (08/23/17 0834)  SpO2: 96 % (08/23/17 0834) Vital Signs (24h Range):  Temp:  [98.2 °F (36.8 °C)-101.6 °F (38.7 °C)] 99.4 °F (37.4 °C)  Pulse:  [] 92  Resp:  [16-19] 18  SpO2:  [95 %-99 %] 96 %  BP: (131-179)/(56-86) 142/66     Weight: 91.4 kg (201 lb 9.6 oz)  Body mass index is 32.54 kg/m².    Physical Exam   Constitutional: She is oriented to person, place, and time. She appears well-developed.   obese   HENT:   Head: Normocephalic and atraumatic.   Eyes: EOM are normal.   Cardiovascular: Normal rate and regular rhythm.    Pulmonary/Chest: No respiratory distress.   Abdominal: Soft. There is tenderness (in area of abscess).   Right side of lower abdominal wall abscess was  examined. Cellulitis is significantly improved, almost resolved. S/p I&D with ~3cm opening and adequately drained. Foul smelling drainage is purulent and bloody. The wound was repacked with gauze.    Musculoskeletal: Normal range of motion.   Neurological: She is alert and oriented to person, place, and time.   Skin: Skin is warm and dry.   Psychiatric: She has a normal mood and affect. Thought content normal.   Vitals reviewed.      Significant Labs:  CBC:   Recent Labs  Lab 08/23/17  0000   WBC 11.71   RBC 4.42   HGB 13.0   HCT 38.2      MCV 86   MCH 29.4   MCHC 34.0     BMP:   Recent Labs  Lab 08/23/17  0000   *      K 4.1      CO2 25   BUN 14   CREATININE 1.0   CALCIUM 10.0     CMP:   Recent Labs  Lab 08/23/17  0000   *   CALCIUM 10.0   ALBUMIN 3.1*   PROT 7.0      K 4.1   CO2 25      BUN 14   CREATININE 1.0   ALKPHOS 112   ALT 11   AST 14   BILITOT 0.6       Significant Diagnostics:  I have reviewed all pertinent imaging results/findings within the past 24 hours.    Assessment/Plan:     Closed fracture of phalanx of right fifth toe    Agree with ortho/podiatry consult        Type 2 diabetes mellitus with hyperglycemia    Glucose elevated in 400's - continue management per primary team        Abdominal wall abscess    S/p I&D in ER. Appears adequately drained and is improving.   Recommend continue local wound care with damp gauze packed into wound, cover with dry gauze and secure with tape.   Consider home health upon discharge for wound care.           Essential hypertension    Continue management per primary team          VTE Risk Mitigation         Ordered     Place sequential compression device  Until discontinued      08/23/17 0438     Low Risk of VTE  Once      08/23/17 0426          Thank you for your consult. I will follow-up with patient. Please contact us if you have any additional questions.    Bernice Gale PA-C  General Surgery  [facility]

## 2017-08-23 NOTE — ASSESSMENT & PLAN NOTE
Uncontrolled, has not taken insulin in 2 days  HbA1C 10.5, Current BG in 400's  Resume home dose Levemir 75 units BID  Moderate dose ISS

## 2017-08-23 NOTE — PLAN OF CARE
CM met with patient at the bedside to assess for discharge needs.  Patient states that she lives with her son and daughter in law.  She is independent and drives herself to medical appointments.  She does anticipate that she will need home health for wound care.  CM discussed [facility] and patient does not have a preference.  Choice form completed and placed in patient blue folder.  CM provided information on pharmacy bedside delivery, information on advanced directives, and discharge planning begins on admission with contact information for SIMÓN Callaway.    BOBBY handed off to SIMÓN Callaway     08/23/17 8795   Discharge Assessment   Assessment Type Discharge Planning Assessment   Confirmed/corrected address and phone number on facesheet? Yes   Assessment information obtained from? Patient;Medical Record   Expected Length of Stay (days) (1-2)   Communicated expected length of stay with patient/caregiver yes   Prior to hospitilization cognitive status: Alert/Oriented   Prior to hospitalization functional status: Independent   Current cognitive status: Alert/Oriented   Current Functional Status: Independent   Facility Arrived From: home   Lives With child(tj), adult   Able to Return to Prior Arrangements yes   Is patient able to care for self after discharge? Yes   Who are your caregiver(s) and their phone number(s)? Panchito Kaye, son [phone]   Patient's perception of discharge disposition home health   Readmission Within The Last 30 Days no previous admission in last 30 days   Patient currently being followed by outpatient case management? No   Patient currently receives any other outside agency services? No   Equipment Currently Used at Home none   Do you have any problems affording any of your prescribed medications? No   Is the patient taking medications as prescribed? yes   Does the patient have transportation home? Yes   Transportation Available family or friend will provide   Dialysis Name and  Scheduled days NA   Does the patient receive services at the [facility]? No   Discharge Plan A Home with family;Home Health   Discharge Plan B Home with family   Patient/Family In Agreement With Plan yes

## 2017-08-23 NOTE — HPI
Tamara Barriga is a 72 y.o. type 2 diabetic female who presented to the ED last night with a large abscess of her abdominal wall. The problem started about 1 week ago and quickly worsened. She c/o warmth, firmness, and tenderness of abdominal wall, fever, chills.  I&D was done in ED.

## 2017-08-23 NOTE — SUBJECTIVE & OBJECTIVE
Past Medical History:   Diagnosis Date    Anxiety     Diabetes mellitus, type 2     Hyperlipidemia     Hypertension     Insomnia        Past Surgical History:   Procedure Laterality Date    BACK SURGERY      BREAST SURGERY      HYSTERECTOMY      LIVER BIOPSY         Review of patient's allergies indicates:  No Known Allergies    No current facility-administered medications on file prior to encounter.      Current Outpatient Prescriptions on File Prior to Encounter   Medication Sig    atenolol (TENORMIN) 100 MG tablet Take 100 mg by mouth once daily.    atorvastatin (LIPITOR) 80 MG tablet TAKE 1 TABLET BY MOUTH EVERY DAY    duloxetine (CYMBALTA) 60 MG capsule Take 60 mg by mouth once daily.    hydrocodone-acetaminophen 5-325mg (NORCO) 5-325 mg per tablet Take 1 tablet by mouth every 6 (six) hours as needed for Pain.    insulin aspart (NOVOLOG FLEXPEN) 100 unit/mL InPn pen 25 units sq  before lunch    insulin detemir (LEVEMIR FLEXTOUCH) 100 unit/mL (3 mL) SubQ InPn pen 75 units sq BID    lorazepam (ATIVAN) 1 MG tablet Take 1 tablet (1 mg total) by mouth every evening.    metformin (GLUCOPHAGE) 1000 MG tablet TAKE 1 TABLET BY MOUTH TWICE A DAY WITH MEALS    sulfamethoxazole-trimethoprim 800-160mg (BACTRIM DS) 800-160 mg Tab Take 1 tablet by mouth 2 (two) times daily.    telmisartan-hydrochlorothiazide (MICARDIS HCT) 40-12.5 mg per tablet Take 1 tablet by mouth once daily.     Family History     Problem Relation (Age of Onset)    Cancer Daughter        Social History Main Topics    Smoking status: Never Smoker    Smokeless tobacco: Never Used    Alcohol use 0.0 - 0.6 oz/week    Drug use: No    Sexual activity: No     Review of Systems   Constitutional: Positive for chills, fatigue and fever. Negative for diaphoresis.   HENT: Negative.  Negative for congestion, nosebleeds and sinus pressure.    Eyes: Negative.  Negative for photophobia, redness and visual disturbance.   Respiratory: Negative.   Negative for cough, chest tightness, shortness of breath and wheezing.    Cardiovascular: Negative.  Negative for chest pain, palpitations and leg swelling.   Gastrointestinal: Positive for abdominal pain (right lower abdominal wall). Negative for diarrhea, nausea and vomiting.   Endocrine: Negative.  Negative for polydipsia, polyphagia and polyuria.   Genitourinary: Negative.  Negative for dysuria, flank pain, frequency and urgency.   Musculoskeletal: Negative.  Negative for back pain, joint swelling and neck stiffness.        Right distal lateral foot pain   Skin: Negative.  Negative for color change, pallor and rash.   Allergic/Immunologic: Negative.  Negative for environmental allergies, food allergies and immunocompromised state.   Neurological: Negative.  Negative for dizziness, syncope, speech difficulty, numbness and headaches.   Hematological: Negative.  Negative for adenopathy. Does not bruise/bleed easily.   Psychiatric/Behavioral: Negative.  Negative for confusion, decreased concentration and hallucinations. The patient is not nervous/anxious.    All other systems reviewed and are negative.    Objective:     Vital Signs (Most Recent):  Temp: (!) 100.5 °F (38.1 °C) (08/23/17 0428)  Pulse: 87 (08/23/17 0428)  Resp: 18 (08/23/17 0428)  BP: (!) 141/66 (08/23/17 0428)  SpO2: 98 % (08/23/17 0428) Vital Signs (24h Range):  Temp:  [98.2 °F (36.8 °C)-101.6 °F (38.7 °C)] 100.5 °F (38.1 °C)  Pulse:  [] 87  Resp:  [16-19] 18  SpO2:  [95 %-99 %] 98 %  BP: (131-179)/(56-86) 141/66     Weight: 83.9 kg (185 lb)  Body mass index is 29.86 kg/m².    Physical Exam   Constitutional: She is oriented to person, place, and time. She appears well-developed and well-nourished. No distress.   HENT:   Head: Normocephalic and atraumatic.   Eyes: Conjunctivae and EOM are normal. No scleral icterus.   Neck: Normal range of motion. Neck supple. No JVD present. No tracheal deviation present. No thyromegaly present.    Cardiovascular: Regular rhythm, normal heart sounds and intact distal pulses.  Tachycardia present.    No murmur heard.  Pulmonary/Chest: Effort normal and breath sounds normal. No respiratory distress. She has no wheezes. She has no rales. She exhibits no tenderness.   Abdominal: Soft. Bowel sounds are normal. She exhibits no distension. There is tenderness.   Large area of right lower abdominal wall induration noted, with I&D done, covered with blood soaked bandage.   Musculoskeletal: Normal range of motion. She exhibits tenderness (right little toe area). She exhibits no edema.   Lymphadenopathy:     She has no cervical adenopathy.   Neurological: She is alert and oriented to person, place, and time. No cranial nerve deficit. She exhibits normal muscle tone. Coordination normal.   Skin: Skin is warm and dry. She is not diaphoretic. No erythema.   Psychiatric: She has a normal mood and affect. Her behavior is normal. Judgment and thought content normal.   Nursing note and vitals reviewed.       Significant Labs:   Bilirubin:   Recent Labs  Lab 08/22/17  1626 08/23/17  0000   BILITOT 0.6 0.6     Blood Culture: No results for input(s): LABBLOO in the last 48 hours.  BMP:   Recent Labs  Lab 08/23/17  0000   *      K 4.1      CO2 25   BUN 14   CREATININE 1.0   CALCIUM 10.0     CBC:   Recent Labs  Lab 08/22/17  1626 08/23/17  0000   WBC 12.40 11.71   HGB 13.5 13.0   HCT 38.9 38.2    273     CMP:   Recent Labs  Lab 08/22/17  1626 08/23/17  0000    138   K 3.9 4.1   CL 99 100   CO2 28 25   * 444*   BUN 11 14   CREATININE 1.1 1.0   CALCIUM 10.3 10.0   PROT 7.6 7.0   ALBUMIN 3.4* 3.1*   BILITOT 0.6 0.6   ALKPHOS 127 112   AST 8* 14   ALT 12 11   ANIONGAP 12 13   EGFRNONAA 51.0* 57*     Cardiac Markers: No results for input(s): CKMB, MYOGLOBIN, BNP, TROPISTAT in the last 48 hours.  Lactic Acid:   Recent Labs  Lab 08/23/17  0035   LACTATE 1.9     Troponin: No results for input(s):  TROPONINI in the last 48 hours.  Urine Studies:   Recent Labs  Lab 08/22/17  1637   COLORU yellow/hazy   PHUR 5   SPECGRAV 1.005   KETONESU neg   NITRITE neg   UROBILINOGEN norm     All pertinent labs within the past 24 hours have been reviewed.    Significant Imaging: I have reviewed and interpreted all pertinent imaging results/findings within the past 24 hours.     Imaging Results    None

## 2017-08-23 NOTE — SUBJECTIVE & OBJECTIVE
No current facility-administered medications on file prior to encounter.      Current Outpatient Prescriptions on File Prior to Encounter   Medication Sig    atenolol (TENORMIN) 100 MG tablet Take 100 mg by mouth once daily.    atorvastatin (LIPITOR) 80 MG tablet TAKE 1 TABLET BY MOUTH EVERY DAY    duloxetine (CYMBALTA) 60 MG capsule Take 60 mg by mouth 2 (two) times daily.     insulin detemir (LEVEMIR FLEXTOUCH) 100 unit/mL (3 mL) SubQ InPn pen 75 units sq BID    lorazepam (ATIVAN) 1 MG tablet Take 1 tablet (1 mg total) by mouth every evening.    metformin (GLUCOPHAGE) 1000 MG tablet TAKE 1 TABLET BY MOUTH TWICE A DAY WITH MEALS    telmisartan-hydrochlorothiazide (MICARDIS HCT) 40-12.5 mg per tablet Take 1 tablet by mouth once daily.    hydrocodone-acetaminophen 5-325mg (NORCO) 5-325 mg per tablet Take 1 tablet by mouth every 6 (six) hours as needed for Pain.    insulin aspart (NOVOLOG FLEXPEN) 100 unit/mL InPn pen 25 units sq  before lunch    sulfamethoxazole-trimethoprim 800-160mg (BACTRIM DS) 800-160 mg Tab Take 1 tablet by mouth 2 (two) times daily.       Review of patient's allergies indicates:  No Known Allergies    Past Medical History:   Diagnosis Date    Anxiety     Diabetes mellitus, type 2     Hyperlipidemia     Hypertension     Insomnia      Past Surgical History:   Procedure Laterality Date    BACK SURGERY      BREAST SURGERY      HYSTERECTOMY      LIVER BIOPSY       Family History     Problem Relation (Age of Onset)    Cancer Daughter        Social History Main Topics    Smoking status: Never Smoker    Smokeless tobacco: Never Used    Alcohol use 0.0 - 0.6 oz/week    Drug use: No    Sexual activity: No     Review of Systems   Constitutional: Positive for chills and fever. Negative for activity change.   Respiratory: Negative for shortness of breath.    Cardiovascular: Negative for chest pain.   Gastrointestinal: Positive for abdominal pain. Negative for nausea and vomiting.    Genitourinary: Negative for dysuria.   Musculoskeletal: Negative for myalgias.   Skin: Negative for wound.   Neurological: Negative for weakness.   Hematological: Does not bruise/bleed easily.   Psychiatric/Behavioral: The patient is not nervous/anxious.      Objective:     Vital Signs (Most Recent):  Temp: 99.4 °F (37.4 °C) (08/23/17 0834)  Pulse: 92 (08/23/17 0834)  Resp: 18 (08/23/17 0834)  BP: (!) 142/66 (08/23/17 0834)  SpO2: 96 % (08/23/17 0834) Vital Signs (24h Range):  Temp:  [98.2 °F (36.8 °C)-101.6 °F (38.7 °C)] 99.4 °F (37.4 °C)  Pulse:  [] 92  Resp:  [16-19] 18  SpO2:  [95 %-99 %] 96 %  BP: (131-179)/(56-86) 142/66     Weight: 91.4 kg (201 lb 9.6 oz)  Body mass index is 32.54 kg/m².    Physical Exam   Constitutional: She is oriented to person, place, and time. She appears well-developed.   obese   HENT:   Head: Normocephalic and atraumatic.   Eyes: EOM are normal.   Cardiovascular: Normal rate and regular rhythm.    Pulmonary/Chest: No respiratory distress.   Abdominal: Soft. There is tenderness (in area of abscess).   Right side of lower abdominal wall abscess was examined. Cellulitis is significantly improved, almost resolved. S/p I&D with ~3cm opening and adequately drained. Foul smelling drainage is purulent and bloody. The wound was repacked with gauze.    Musculoskeletal: Normal range of motion.   Neurological: She is alert and oriented to person, place, and time.   Skin: Skin is warm and dry.   Psychiatric: She has a normal mood and affect. Thought content normal.   Vitals reviewed.      Significant Labs:  CBC:   Recent Labs  Lab 08/23/17  0000   WBC 11.71   RBC 4.42   HGB 13.0   HCT 38.2      MCV 86   MCH 29.4   MCHC 34.0     BMP:   Recent Labs  Lab 08/23/17  0000   *      K 4.1      CO2 25   BUN 14   CREATININE 1.0   CALCIUM 10.0     CMP:   Recent Labs  Lab 08/23/17  0000   *   CALCIUM 10.0   ALBUMIN 3.1*   PROT 7.0      K 4.1   CO2 25       BUN 14   CREATININE 1.0   ALKPHOS 112   ALT 11   AST 14   BILITOT 0.6       Significant Diagnostics:  I have reviewed all pertinent imaging results/findings within the past 24 hours.

## 2017-08-23 NOTE — HOSPITAL COURSE
72 year old female admitted for sepsis due to abdominal cellulitis and abscess. Incidentally, she was noted to have a 5th distal metatarsal fracture from fall that occurred prior to admittance. She underwent a bedside I&D in ED. General Surgery saw the patient and felt that abscess had been adequately drained and advised local wound care and packing. Cellulitis had largely improved. Wound care has been consulted for home health management. Pt remained afebrile x 24 hours and preliminary blood/wound cultures indicated NGTD. IV antibiotics given for Vanco and Zosyn. Pt's Hgb A1C = 10.5 and diabetic nurse educator was consulted who had a lenghty discussion regarding setting goals and making manageable improvements in her diabetes care. Orthopedics saw the patient regarding foot fracture and it was recommended for pt to weight bear as tolerated, repeat xray in 7 days and soft cast shoe vs boot. Wound care nurse saw the patient and dressing changed. Pt was afebrile for greater than 24 hours. Blood cultures were NGTD and wound culture grew Streptococcus intermedius. Blood sugars were controlled. After 3 days of IV ABX, pt was discharged to home with home health services for dressing changes. She was prescribed Augmentin and Bactrim. She was advised to control blood sugars, follow up with PCP and Orthopedics. Pt was seen and examined and determined to be safe and stable for discharge.

## 2017-08-23 NOTE — ASSESSMENT & PLAN NOTE
-Temp 101,  with abdominal wall abscess and was felt to be due to underlying sepsis. Tachycardia was likely related to intravascular volume depletion from hyperglycemia (>400's)  -patient did not fail therapy as she never started Bactrim upon.   -S/p I&D on 8/22/17 removing large amount of purulent drainage.   -continue IV Vanc and IV cefepime  -Seen by General Surgery today who repacked dressing and did not rec further surgical intervention.  -Given history of noncompliance with medications, will need to continue IV antibiotics for additional day and improve glucose to ensure adequate healing.   -await wound cultures  -wound care consult in AM to re-evaluate wound and teach wound dressing.   -monitor fever curve. As Tmax 101.6

## 2017-08-23 NOTE — ASSESSMENT & PLAN NOTE
s/p I&D in the ED.  Follow up with wound and blood cultures.  Continue IV cefepime, Vancomycin  Surgery consult

## 2017-08-23 NOTE — CONSULTS
[facility]  Orthopedics  Consult Note    Patient Name: Tamara Barriga  MRN: [id number]  Admission Date: 8/22/2017  Hospital Length of Stay: 0 days  Attending Provider: Kiran Kimble, *  Primary Care Provider: CHAR BARRIOS MD    Patient information was obtained from patient and ER records.     Consults  Subjective:     Principal Problem:Sepsis    Chief Complaint:   Chief Complaint   Patient presents with    Abscess     seen today by PCP. labs and ct ordered for tomorrow. reports she hasnt been able to get prescription for pain medicine or antibotics. increased pain and discomfot to right lower abd where celluitis is.         HPI: Closed right 5th metatarsal neck fracture. She reports minimal pain. Able to weight bear. Indicates previous history of right 5th metatarsal fracture.    Past Medical History:   Diagnosis Date    Anxiety     Diabetes mellitus, type 2     Hyperlipidemia     Hypertension     Insomnia        Past Surgical History:   Procedure Laterality Date    BACK SURGERY      BREAST SURGERY      HYSTERECTOMY      LIVER BIOPSY         Review of patient's allergies indicates:  No Known Allergies    Current Facility-Administered Medications   Medication    0.9%  NaCl infusion    albuterol-ipratropium 2.5mg-0.5mg/3mL nebulizer solution 3 mL    aluminum-magnesium hydroxide-simethicone 200-200-20 mg/5 mL suspension 30 mL    atenolol tablet 100 mg    atorvastatin tablet 80 mg    ceFEPIme in dextrose 5% 2 gram/50 mL IVPB 2 g    dextrose 50% injection 12.5 g    dextrose 50% injection 25 g    duloxetine DR capsule 60 mg    glucagon (human recombinant) injection 1 mg    glucose chewable tablet 16 g    glucose chewable tablet 24 g    hydrochlorothiazide tablet 12.5 mg    hydrocodone-acetaminophen 5-325mg per tablet 1 tablet    insulin aspart pen 1-10 Units    insulin detemir pen 75 Units    lorazepam tablet 1 mg    oxycodone-acetaminophen  mg per  "tablet 1 tablet    vancomycin (VANCOCIN) 1,250 mg in dextrose 5 % 250 mL IVPB     Family History     Problem Relation (Age of Onset)    Cancer Daughter        Social History Main Topics    Smoking status: Never Smoker    Smokeless tobacco: Never Used    Alcohol use 0.0 - 0.6 oz/week    Drug use: No    Sexual activity: No     ROS  Objective:     Vital Signs (Most Recent):  Temp: 99.4 °F (37.4 °C) (08/23/17 0834)  Pulse: 92 (08/23/17 0834)  Resp: 18 (08/23/17 0834)  BP: (!) 142/66 (08/23/17 0834)  SpO2: 96 % (08/23/17 0834) Vital Signs (24h Range):  Temp:  [98.2 °F (36.8 °C)-101.6 °F (38.7 °C)] 99.4 °F (37.4 °C)  Pulse:  [] 92  Resp:  [16-19] 18  SpO2:  [95 %-99 %] 96 %  BP: (131-179)/(56-86) 142/66     Weight: 91.4 kg (201 lb 9.6 oz)  Height: 5' 6" (167.6 cm)  Body mass index is 32.54 kg/m².    No intake or output data in the 24 hours ending 08/23/17 1026    Ortho/SPM Exam   Right foot:  No gross deformity  No wounds  Mild TTP 5th metatarsal shaft  Decreased LTS  Able to flex and extend all toes  Significant Labs: All pertinent labs within the past 24 hours have been reviewed.    Significant Imaging: I have reviewed all pertinent imaging results/findings.    Assessment/Plan: Closed minimally displaced 5th metatarsal shaft/neck fracture, right foot   1. WBAT RLE  2. Patient can be either in short cam boot or cast shoe  3. Needs repeat xray in 7 days  4. Informed about risk of non union and charchot arthropathy  Active Diagnoses:    Diagnosis Date Noted POA    PRINCIPAL PROBLEM:  Sepsis [A41.9] 08/23/2017 Yes    Abdominal wall abscess [L02.211] 08/23/2017 Yes    Type 2 diabetes mellitus with hyperglycemia [E11.65] 08/23/2017 Yes    Closed fracture of phalanx of right fifth toe [S92.501A] 08/23/2017 Yes    Essential hypertension [I10] 04/19/2017 Yes      Problems Resolved During this Admission:    Diagnosis Date Noted Date Resolved POA       Thank you for your consult. I will sign off. Please contact us " if you have any additional questions.    Jeff Mason MD  Orthopedics  [facility]

## 2017-08-23 NOTE — ASSESSMENT & PLAN NOTE
Uncontrolled, has not taken insulin in 2 days  HbA1C 10.5, Current BG in 400's  Resume home dose Levemir 75 units BID  Moderate dose ISS  -thoroughly counseled on risk and complications associated with hyperglycemia  -Diabetes Educator has been consulted.

## 2017-08-23 NOTE — CONSULTS
Consult received for evaluation and treatment of patient's abdominal abscess s/p I&D.  Wound care wet to dry dressing changed per CAIT Humphrey this am.  Spoke with JEFF Valerio NP regarding care of this patient. I will plan to see and assess patient tomorrow.  Julianna Keenan RN

## 2017-08-23 NOTE — PROGRESS NOTES
Patient seen and examined today. 72 year old female admitted for sepsis due to abdominal cellulitis and abscess and right fifth toe fracture. Underwent bedside I&D in ED. Seen by General Surgery who examined wound today. Abscess has been successfully drained and cellulitis large improved. Wound care has been consulted for home health management. Fevers have improved. Awaiting wound and blood cultures. Patient has been thoroughly counseled on glucose control.

## 2017-08-23 NOTE — CONSULTS
Consult received   Chart reviewed patient assessed  She reports was diagnosed over 20 years ago and has received diabetes education in the past  She has a home glucose monitor and checks once a month with averages in the 300's.  lenghty discussion regarding setting goals and making manageable improvements in her diabetes care  She now agrees to check 2 times weekly   Recommended she alternate testing days and times to her convenience and record results for PCP visits  She is consistent with administering her insulin using the insulin pen   Verbalizes correct insulin storage and site selection/rotation  Has good recall on teach back but reviewed info on target glucose values/hyper/hypoglycemia  She acknowledges drinking one regular soft drink per day and fruit   Recommended she drink only zero-calore, diet and limit fruit intake  She verbalizes understanding    Literature provided

## 2017-08-23 NOTE — ASSESSMENT & PLAN NOTE
S/p I&D in ER. Appears adequately drained and is improving.   Recommend continue local wound care with damp gauze packed into wound, cover with dry gauze and secure with tape.   Consider home health upon discharge for wound care.

## 2017-08-23 NOTE — ASSESSMENT & PLAN NOTE
Temp 101,  with abdominal wall abscess  IV Vanc and IV cefepime  Wound and blood cultures  IV fluids  Monitor  Labs in AM

## 2017-08-23 NOTE — ED PROVIDER NOTES
SCRIBE #1 NOTE: I, Suzette Lloyd, am scribing for, and in the presence of, Aldo Tucker MD. I have scribed the entire note.      History      Chief Complaint   Patient presents with    Abscess     seen today by PCP. labs and ct ordered for tomorrow. reports she hasnt been able to get prescription for pain medicine or antibotics. increased pain and discomfot to right lower abd where celluitis is.        Review of patient's allergies indicates:  No Known Allergies     HPI   HPI    8/22/2017, 10:33 PM   History obtained from the patient      History of Present Illness: Tamara Barriga is a 72 y.o. female patient who presents to the Emergency Department for R lower abd pain from an abscess which onset gradually over the past week. Symptoms are constant and moderate in severity. Pt reports that she saw her PCP this morning and they told her that she has an infection on her tummy wall. Pt reports that she was suppose to have lab and a CT done tomorrow and she has not been able to get a prescription for pain medication. No mitigating or exacerbating factors reported. No other associated sxs reported. Patient denies any active drainage, n/v/d, HA, fever, light-headedness, dizziness and all other sxs at this time.  No further complaints or concerns at this time.         Arrival mode: Personal vehicle     PCP: Primary Doctor No       Past Medical History:  Past Medical History:   Diagnosis Date    Anxiety     Diabetes mellitus, type 2     Hyperlipidemia     Hypertension     Insomnia        Past Surgical History:  Past Surgical History:   Procedure Laterality Date    BACK SURGERY      BREAST SURGERY      HYSTERECTOMY      LIVER BIOPSY           Family History:  Family History   Problem Relation Age of Onset    Cancer Daughter      Breast Ca        Social History:  Social History     Social History Main Topics    Smoking status: Never Smoker    Smokeless tobacco: Never Used    Alcohol use 0.0 - 0.6 oz/week     Drug use: No    Sexual activity: No       ROS   Review of Systems   Constitutional: Negative for fever.   HENT: Negative for sore throat.    Respiratory: Negative for shortness of breath.    Cardiovascular: Negative for chest pain.   Gastrointestinal: Positive for abdominal pain (R lower). Negative for diarrhea, nausea and vomiting.   Genitourinary: Negative for dysuria.   Musculoskeletal: Negative for back pain.   Skin: Negative for rash.        +abscess on stomach  -active drainage     Neurological: Negative for dizziness, weakness, light-headedness and headaches.   Hematological: Does not bruise/bleed easily.   All other systems reviewed and are negative.    Physical Exam      Initial Vitals [08/22/17 2125]   BP Pulse Resp Temp SpO2   (!) 179/84 103 19 (!) 101.6 °F (38.7 °C) 96 %      MAP       115.67          Physical Exam  Nursing Notes and Vital Signs Reviewed.  Constitutional: Patient is in no apparent distress. Well-developed and well-nourished.  Head: Atraumatic. Normocephalic.  Eyes: PERRL. EOM intact. Conjunctivae are not pale. No scleral icterus.  ENT: Mucous membranes are moist. Oropharynx is clear and symmetric.    Neck: Supple. Full ROM. No lymphadenopathy.  Cardiovascular: Regular rate. Regular rhythm. No murmurs, rubs, or gallops. Distal pulses are 2+ and symmetric.  Pulmonary/Chest: No respiratory distress. Clear to auscultation bilaterally. No wheezing, rales, or rhonchi.  Abdominal: Soft and non-distended.  There is no tenderness.  No rebound, guarding, or rigidity. Good bowel sounds.  Genitourinary: No CVA tenderness  Musculoskeletal: Moves all extremities. No obvious deformities. No edema. No calf tenderness.  Skin: 3 cm abscess on abdominal wall.  Neurological:  Alert, awake, and appropriate.  Normal speech.  No acute focal neurological deficits are appreciated.  Psychiatric: Normal affect. Good eye contact. Appropriate in content.    ED Course    I & D - Incision and Drainage  Date/Time:  "8/23/2017 8:52 AM  Performed by: SELIN SANTOS  Authorized by: TRENT BLAKELY   Consent Done: Yes  Consent: Verbal consent obtained.  Risks and benefits: risks, benefits and alternatives were discussed  Consent given by: patient  Patient understanding: patient states understanding of the procedure being performed  Type: abscess  Body area: trunk  Location details: abdomen  Anesthesia: local infiltration    Anesthesia:  Local Anesthetic: lidocaine 1% without epinephrine  Scalpel size: 11  Incision type: single straight  Complexity: simple  Drainage: purulent  Drainage amount: copious  Wound treatment: wound left open and  wound packed  Packing material: 1/4 in gauze  Complications: No  Patient tolerance: Patient tolerated the procedure well with no immediate complications        ED Vital Signs:  Vitals:    08/22/17 2125 08/23/17 0050 08/23/17 0200 08/23/17 0300   BP: (!) 179/84 (!) 154/76  (!) 131/56   Pulse: 103 89  87   Resp: 19 18 18 16   Temp: (!) 101.6 °F (38.7 °C) 98.5 °F (36.9 °C)     TempSrc: Oral Oral     SpO2: 96% 99% 95% 95%   Weight: 83.9 kg (185 lb)      Height: 5' 6" (1.676 m)       08/23/17 0400 08/23/17 0428 08/23/17 0834   BP: (!) 137/57 (!) 141/66 (!) 142/66   Pulse: 80 87 92   Resp: 18 18 18   Temp: 98.2 °F (36.8 °C) (!) 100.5 °F (38.1 °C) 99.4 °F (37.4 °C)   TempSrc: Oral Axillary Oral   SpO2: 97% 98% 96%   Weight:  91.4 kg (201 lb 9.6 oz)    Height:  5' 6" (1.676 m)        Abnormal Lab Results:  Labs Reviewed   CBC W/ AUTO DIFFERENTIAL - Abnormal; Notable for the following:        Result Value    Gran # 8.1 (*)     All other components within normal limits   COMPREHENSIVE METABOLIC PANEL - Abnormal; Notable for the following:     Glucose 444 (*)     Albumin 3.1 (*)     eGFR if non  57 (*)     All other components within normal limits   POCT GLUCOSE - Abnormal; Notable for the following:     POCT Glucose 399 (*)     All other components within normal limits   POCT " GLUCOSE - Abnormal; Notable for the following:     POCT Glucose 389 (*)     All other components within normal limits   CULTURE, BLOOD   CULTURE, BLOOD   CULTURE, AEROBIC  (SPECIFY SOURCE)   LACTIC ACID, PLASMA   BETA - HYDROXYBUTYRATE, SERUM        All Lab Results:  Results for orders placed or performed during the hospital encounter of 08/22/17   CBC auto differential   Result Value Ref Range    WBC 11.71 3.90 - 12.70 K/uL    RBC 4.42 4.00 - 5.40 M/uL    Hemoglobin 13.0 12.0 - 16.0 g/dL    Hematocrit 38.2 37.0 - 48.5 %    MCV 86 82 - 98 fL    MCH 29.4 27.0 - 31.0 pg    MCHC 34.0 32.0 - 36.0 g/dL    RDW 13.1 11.5 - 14.5 %    Platelets 273 150 - 350 K/uL    MPV 10.9 9.2 - 12.9 fL    Gran # 8.1 (H) 1.8 - 7.7 K/uL    Lymph # 2.7 1.0 - 4.8 K/uL    Mono # 0.9 0.3 - 1.0 K/uL    Eos # 0.0 0.0 - 0.5 K/uL    Baso # 0.02 0.00 - 0.20 K/uL    Gran% 69.1 38.0 - 73.0 %    Lymph% 23.1 18.0 - 48.0 %    Mono% 7.3 4.0 - 15.0 %    Eosinophil% 0.3 0.0 - 8.0 %    Basophil% 0.2 0.0 - 1.9 %    Differential Method Automated    Comprehensive metabolic panel   Result Value Ref Range    Sodium 138 136 - 145 mmol/L    Potassium 4.1 3.5 - 5.1 mmol/L    Chloride 100 95 - 110 mmol/L    CO2 25 23 - 29 mmol/L    Glucose 444 (H) 70 - 110 mg/dL    BUN, Bld 14 8 - 23 mg/dL    Creatinine 1.0 0.5 - 1.4 mg/dL    Calcium 10.0 8.7 - 10.5 mg/dL    Total Protein 7.0 6.0 - 8.4 g/dL    Albumin 3.1 (L) 3.5 - 5.2 g/dL    Total Bilirubin 0.6 0.1 - 1.0 mg/dL    Alkaline Phosphatase 112 55 - 135 U/L    AST 14 10 - 40 U/L    ALT 11 10 - 44 U/L    Anion Gap 13 8 - 16 mmol/L    eGFR if African American >60 >60 mL/min/1.73 m^2    eGFR if non African American 57 (A) >60 mL/min/1.73 m^2   Lactic acid, plasma   Result Value Ref Range    Lactate (Lactic Acid) 1.9 0.5 - 2.2 mmol/L   Beta - Hydroxybutyrate, Serum   Result Value Ref Range    Beta-Hydroxybutyrate 0.1 0.0 - 0.5 mmol/L   POCT glucose   Result Value Ref Range    POCT Glucose 399 (H) 70 - 110 mg/dL   POCT glucose    Result Value Ref Range    POCT Glucose 389 (H) 70 - 110 mg/dL   POCT glucose   Result Value Ref Range    POCT Glucose 395 (H) 70 - 110 mg/dL            The Emergency Provider reviewed the vital signs and test results, which are outlined above.    ED Discussion   3:36 AM: Discussed case with Dr. Coates ([facility]). Dr. Coates agrees with current care and management of pt and accepts admission for inpatient IV antibiotics.   Admitting Service: [facility]   Admitting Physician: Dr. Coates  Admit to: In-patient Med-Surg    3:38 AM: Re-evaluated pt. I have discussed test results, shared treatment plan, and the need for admission with patient. Pt expresses understanding at this time and agrees with all information. All questions answered. Pt has no further questions or concerns at this time. Pt is ready for admit.      ED Medication(s):  Medications   atorvastatin tablet 80 mg (80 mg Oral Given 8/23/17 0840)   atenolol tablet 100 mg (100 mg Oral Given 8/23/17 0839)   duloxetine DR capsule 60 mg (60 mg Oral Given 8/23/17 0840)   lorazepam tablet 1 mg (not administered)   0.9%  NaCl infusion ( Intravenous New Bag 8/23/17 0436)   vancomycin (VANCOCIN) 1,250 mg in dextrose 5 % 250 mL IVPB (1,250 mg Intravenous Trough Due 30 minutes Before Dose 8/24/17 1145)   ceFEPIme in dextrose 5% 2 gram/50 mL IVPB 2 g (not administered)   glucose chewable tablet 16 g (not administered)   glucose chewable tablet 24 g (not administered)   dextrose 50% injection 12.5 g (not administered)   dextrose 50% injection 25 g (not administered)   glucagon (human recombinant) injection 1 mg (not administered)   insulin aspart pen 1-10 Units (10 Units Subcutaneous Given 8/23/17 0725)   hydrochlorothiazide tablet 12.5 mg (12.5 mg Oral Given 8/23/17 0840)   aluminum-magnesium hydroxide-simethicone 200-200-20 mg/5 mL suspension 30 mL (not administered)   albuterol-ipratropium 2.5mg-0.5mg/3mL nebulizer solution 3 mL (not administered)    oxycodone-acetaminophen  mg per tablet 1 tablet (not administered)   hydrocodone-acetaminophen 5-325mg per tablet 1 tablet (1 tablet Oral Given 8/23/17 0530)   insulin detemir pen 75 Units (not administered)   hydromorphone (PF) injection 1 mg (1 mg Intravenous Given 8/23/17 0003)   lidocaine HCL 10 mg/ml (1%) injection 10 mL (10 mLs Infiltration Given by Other 8/23/17 0003)   sodium chloride 0.9% bolus 2,500 mL (0 mLs Intravenous Stopped 8/23/17 0218)   vancomycin (VANCOCIN) 1,250 mg in dextrose 5 % 250 mL IVPB (0 mg/kg × 83.9 kg Intravenous Stopped 8/23/17 0218)   ceFEPIme in dextrose 5% 2 gram/50 mL IVPB 2 g (0 g Intravenous Stopped 8/23/17 0304)       Current Discharge Medication List                Medical Decision Making    Medical Decision Making:   Clinical Tests:   Lab Tests: Ordered and Reviewed           Scribe Attestation:   Scribe #1: I performed the above scribed service and the documentation accurately describes the services I performed. I attest to the accuracy of the note.    Attending:   Physician Attestation Statement for Scribe #1: I, Aldo Tucker MD, personally performed the services described in this documentation, as scribed by Suzette Desai, in my presence, and it is both accurate and complete.          Clinical Impression       ICD-10-CM ICD-9-CM   1. Abdominal wall abscess L02.211 682.2   2. Type 2 diabetes mellitus with hyperglycemia, with long-term current use of insulin E11.65 250.00    Z79.4 790.29     V58.67   3. Sepsis A41.9 038.9     995.91       Disposition:   Disposition: Admitted  Condition: Fair       Aldo Tucker MD  08/23/17 0899

## 2017-08-23 NOTE — CONSULTS
Tamara Barriga [id number] is a 72 y.o. female who has been consulted for vancomycin dosing.  Dx: Skin/soft tissue ; goal trough 12-15  The patient has the following labs:     Date Creatinine (mg/dl)    BUN WBC Count   8/23/2017 Estimated Creatinine Clearance: 57.1 mL/min (based on Cr of 1). Lab Results   Component Value Date    BUN 14 08/23/2017     Lab Results   Component Value Date    WBC 11.71 08/23/2017      which calculates to an Estimated Creatinine Clearance: 57.1 mL/min (based on Cr of 1)..       Current weight is 83.9 kg (185 lb)    The patient will be started on vancomycin at a dose of 1250 mg every 18 hours. Patient will be followed by pharmacy for changes in renal function, toxicity, and efficacy.  The vancomycin trough has been ordered for 8/24 at 11:45.      Thank you for allowing us to participate in this patient's care.     All Browne

## 2017-08-23 NOTE — ASSESSMENT & PLAN NOTE
-Orthopedic consult appreciated  -Right Lower Extremity WBAT  -Will need cast shoe  -patient will need to repeat Xray in 7 days

## 2017-08-23 NOTE — PLAN OF CARE
Problem: Patient Care Overview  Goal: Plan of Care Review  Outcome: Ongoing (interventions implemented as appropriate)  Patient arrived from ER to floor around 0430.  Abd wound dressing saturated with blood.  Gauze dressing changed; packing left intact.  CBG of 395 covered with 10 units of insulin aspart per s/s orders. Contact isolation initiated as ordered as precaution while blood cultures are pending. IVF administered as ordered. Pt. Oriented to surroundings, hourly rounding, and bedside report. Call light in reach. Bed low and wheels locked. Chart reviewed for admission orders.

## 2017-08-23 NOTE — PROGRESS NOTES
"[facility] Hospital Medicine  Progress Note    Patient Name: Tamara Barriga  MRN: [id number]  Patient Class: IP- Inpatient   Admission Date: 8/22/2017  Length of Stay: 0 days  Attending Physician: Kiran Kimble, *  Primary Care Provider: CHAR BARRIOS MD      Subjective:     Principal Problem:Sepsis    HPI:  Ms. Barriga is a 73 y/o  female with h/o HTN, T2DM insulin dependant, has been suffering with a right abdominal wall redness/abscess for the past one week. She sought medical attention 2 days ago, was prescribed bactrim, but patient hasn't gotten it filled and did not take the antibiotic yet. Today she presented to the ED with fever, chills, worsening right abdominal wall swelling. I&D was performed int he ED, large amount of purulent drainage per . Wound and blood cultures obtained. Temp 101, , WBC 11.7. Patient received NS 30 ml/kg IV bolus int he ED. Lactic acid 1.0. Started on NS at 125 cc/hr. Glucose elevated in 400's, has not taken insulin in 2 days ("I ran out of my insulin"). HbA1C 10.5.    Also c/o right foot pain, XR shows acute obliquely oriented fracture involving the distal shaft/neck of the 5th metatarsal.    Admitted for sepsis due to right abdominal wall abscess. Received Vanc/Cefepime in the ED.    Hospital Course:  72 year old female admitted for sepsis due to abdominal cellulitis and abscess and right fifth toe fracture. Underwent bedside I&D in ED. Seen by General Surgery who examined wound today. Abscess has been successfully drained and cellulitis large improved. Wound care has been consulted for home health management. Fevers have improved. Awaiting wound and blood cultures. Patient has been thoroughly counseled on glucose control.       Interval History: s/p I&D in ED. Seen by General Surgery who did not recommend further surgical intervention. Glucose remains uncontrolled. TMax 101.5. Blood cultures and wound cultures are " pending.     Review of Systems   Constitutional: Positive for chills and fever. Negative for activity change.   Respiratory: Negative for shortness of breath.    Cardiovascular: Negative for chest pain.   Gastrointestinal: Positive for abdominal pain. Negative for nausea and vomiting.   Genitourinary: Negative for dysuria.   Musculoskeletal: Negative for myalgias.   Skin: Negative for wound.   Neurological: Negative for weakness.   Hematological: Does not bruise/bleed easily.   Psychiatric/Behavioral: The patient is not nervous/anxious.      Objective:     Vital Signs (Most Recent):  Temp: 98.4 °F (36.9 °C) (08/23/17 1200)  Pulse: 71 (08/23/17 1200)  Resp: 18 (08/23/17 1200)  BP: 120/74 (08/23/17 1200)  SpO2: 95 % (08/23/17 1200) Vital Signs (24h Range):  Temp:  [98.2 °F (36.8 °C)-101.6 °F (38.7 °C)] 98.4 °F (36.9 °C)  Pulse:  [] 71  Resp:  [16-19] 18  SpO2:  [95 %-99 %] 95 %  BP: (120-179)/(56-86) 120/74     Weight: 91.4 kg (201 lb 9.6 oz)  Body mass index is 32.54 kg/m².    Intake/Output Summary (Last 24 hours) at 08/23/17 1448  Last data filed at 08/23/17 1438   Gross per 24 hour   Intake          1494.17 ml   Output              150 ml   Net          1344.17 ml      Physical Exam   Constitutional: She is oriented to person, place, and time. She appears well-developed.   obese   HENT:   Head: Normocephalic and atraumatic.   Eyes: EOM are normal.   Cardiovascular: Normal rate and regular rhythm.    Pulmonary/Chest: No respiratory distress.   Abdominal: Soft. There is tenderness (surrounding previous abscess).    S/p I&D with ~5cm dressing intact with bloody malodorous drainage. Wound was repacked today by surgery team    Musculoskeletal: Normal range of motion.   Neurological: She is alert and oriented to person, place, and time.   Skin: Skin is warm and dry.   Psychiatric: She has a normal mood and affect. Thought content normal.   Vitals reviewed.    Significant Labs:   CBC:   Recent Labs  Lab  08/22/17  1626 08/23/17  0000   WBC 12.40 11.71   HGB 13.5 13.0   HCT 38.9 38.2    273     CMP:   Recent Labs  Lab 08/22/17  1626 08/23/17  0000    138   K 3.9 4.1   CL 99 100   CO2 28 25   * 444*   BUN 11 14   CREATININE 1.1 1.0   CALCIUM 10.3 10.0   PROT 7.6 7.0   ALBUMIN 3.4* 3.1*   BILITOT 0.6 0.6   ALKPHOS 127 112   AST 8* 14   ALT 12 11   ANIONGAP 12 13   EGFRNONAA 51.0* 57*       Significant Imaging:   Imaging Results    None           Assessment/Plan:      * Abdominal Wall Cellulitis and Abscess    -Temp 101,  with abdominal wall abscess and was felt to be due to underlying sepsis. Tachycardia was likely related to intravascular volume depletion from hyperglycemia (>400's)  -patient did not fail therapy as she never started Bactrim upon.   -S/p I&D on 8/22/17 removing large amount of purulent drainage.   -continue IV Vanc and IV cefepime  -Seen by General Surgery today who repacked dressing and did not rec further surgical intervention.  -Given history of noncompliance with medications, will need to continue IV antibiotics for additional day and improve glucose to ensure adequate healing.   -await wound cultures  -wound care consult in AM to re-evaluate wound and teach wound dressing.   -monitor fever curve. As Tmax 101.6        Tachycardia      -quickly improved after IVF's.   -likely related to dehydration from hyperglycemia        Closed fracture of phalanx of right fifth toe    -Orthopedic consult appreciated  -Right Lower Extremity WBAT  -Will need cast shoe  -patient will need to repeat Xray in 7 days         Type 2 diabetes mellitus with hyperglycemia    Uncontrolled, has not taken insulin in 2 days  HbA1C 10.5, Current BG in 400's  Resume home dose Levemir 75 units BID  Moderate dose ISS  -thoroughly counseled on risk and complications associated with hyperglycemia  -Diabetes Educator has been consulted.            Abdominal wall abscess    s/p I&D in the ED.  Follow up with  wound and blood cultures.  Continue IV cefepime, Vancomycin  Surgery consult          Essential hypertension    Resume home medications.  Well controlled at this time.            VTE Risk Mitigation         Ordered     Place sequential compression device  Until discontinued      08/23/17 0438     Low Risk of VTE  Once      08/23/17 0426              Lucia Valerio NP  Department of Hospital Medicine   [facility]

## 2017-08-23 NOTE — HPI
"Ms. Barriga is a 71 y/o  female with h/o HTN, T2DM insulin dependant, has been suffering with a right abdominal wall redness/abscess for the past one week. She sought medical attention 2 days ago, was prescribed bactrim, but patient hasn't gotten it filled and did not take the antibiotic yet. Today she presented to the ED with fever, chills, worsening right abdominal wall swelling. I&D was performed int he ED, large amount of purulent drainage per . Wound and blood cultures obtained. Temp 101, , WBC 11.7. Patient received NS 30 ml/kg IV bolus int he ED. Lactic acid 1.0. Started on NS at 125 cc/hr. Glucose elevated in 400's, has not taken insulin in 2 days ("I ran out of my insulin"). HbA1C 10.5.    Also c/o right foot pain, XR shows acute obliquely oriented fracture involving the distal shaft/neck of the 5th metatarsal.    Admitted for sepsis due to right abdominal wall abscess. Received Vanc/Cefepime in the ED.  "

## 2017-08-23 NOTE — SUBJECTIVE & OBJECTIVE
Interval History: s/p I&D in ED. Seen by General Surgery who did not recommend further surgical intervention. Glucose remains uncontrolled. TMax 101.5. Blood cultures and wound cultures are pending.     Review of Systems   Constitutional: Positive for chills and fever. Negative for activity change.   Respiratory: Negative for shortness of breath.    Cardiovascular: Negative for chest pain.   Gastrointestinal: Positive for abdominal pain. Negative for nausea and vomiting.   Genitourinary: Negative for dysuria.   Musculoskeletal: Negative for myalgias.   Skin: Negative for wound.   Neurological: Negative for weakness.   Hematological: Does not bruise/bleed easily.   Psychiatric/Behavioral: The patient is not nervous/anxious.      Objective:     Vital Signs (Most Recent):  Temp: 98.4 °F (36.9 °C) (08/23/17 1200)  Pulse: 71 (08/23/17 1200)  Resp: 18 (08/23/17 1200)  BP: 120/74 (08/23/17 1200)  SpO2: 95 % (08/23/17 1200) Vital Signs (24h Range):  Temp:  [98.2 °F (36.8 °C)-101.6 °F (38.7 °C)] 98.4 °F (36.9 °C)  Pulse:  [] 71  Resp:  [16-19] 18  SpO2:  [95 %-99 %] 95 %  BP: (120-179)/(56-86) 120/74     Weight: 91.4 kg (201 lb 9.6 oz)  Body mass index is 32.54 kg/m².    Intake/Output Summary (Last 24 hours) at 08/23/17 1448  Last data filed at 08/23/17 1438   Gross per 24 hour   Intake          1494.17 ml   Output              150 ml   Net          1344.17 ml      Physical Exam   Constitutional: She is oriented to person, place, and time. She appears well-developed.   obese   HENT:   Head: Normocephalic and atraumatic.   Eyes: EOM are normal.   Cardiovascular: Normal rate and regular rhythm.    Pulmonary/Chest: No respiratory distress.   Abdominal: Soft. There is tenderness (surrounding previous abscess).    S/p I&D with ~5cm dressing intact with bloody malodorous drainage. Wound was repacked today by surgery team    Musculoskeletal: Normal range of motion.   Neurological: She is alert and oriented to person, place,  and time.   Skin: Skin is warm and dry.   Psychiatric: She has a normal mood and affect. Thought content normal.   Vitals reviewed.    Significant Labs:   CBC:   Recent Labs  Lab 08/22/17  1626 08/23/17  0000   WBC 12.40 11.71   HGB 13.5 13.0   HCT 38.9 38.2    273     CMP:   Recent Labs  Lab 08/22/17  1626 08/23/17  0000    138   K 3.9 4.1   CL 99 100   CO2 28 25   * 444*   BUN 11 14   CREATININE 1.1 1.0   CALCIUM 10.3 10.0   PROT 7.6 7.0   ALBUMIN 3.4* 3.1*   BILITOT 0.6 0.6   ALKPHOS 127 112   AST 8* 14   ALT 12 11   ANIONGAP 12 13   EGFRNONAA 51.0* 57*       Significant Imaging:   Imaging Results    None

## 2017-08-23 NOTE — ED NOTES
Pt lying in bed in NAD,VSS,RR equal and unlabored. Bed is low, locked, and call light in reach. Pt has dressing on I and D site to R Abdomen. Pt has no complaints. Side rails up x 2. Will continue to monitor pt.

## 2017-08-24 PROBLEM — E83.42 HYPOMAGNESEMIA: Status: ACTIVE | Noted: 2017-08-24

## 2017-08-24 PROBLEM — E87.6 HYPOKALEMIA: Status: ACTIVE | Noted: 2017-08-24

## 2017-08-24 PROBLEM — E83.39 HYPOPHOSPHATASIA: Status: ACTIVE | Noted: 2017-08-24

## 2017-08-24 LAB
ALBUMIN SERPL BCP-MCNC: 2.6 G/DL
ALP SERPL-CCNC: 117 U/L
ALT SERPL W/O P-5'-P-CCNC: 15 U/L
ANION GAP SERPL CALC-SCNC: 9 MMOL/L
AST SERPL-CCNC: 13 U/L
BASOPHILS # BLD AUTO: 0.01 K/UL
BASOPHILS NFR BLD: 0.2 %
BILIRUB SERPL-MCNC: 0.5 MG/DL
BUN SERPL-MCNC: 10 MG/DL
CALCIUM SERPL-MCNC: 8.6 MG/DL
CHLORIDE SERPL-SCNC: 104 MMOL/L
CO2 SERPL-SCNC: 24 MMOL/L
CREAT SERPL-MCNC: 0.8 MG/DL
DIFFERENTIAL METHOD: ABNORMAL
EOSINOPHIL # BLD AUTO: 0 K/UL
EOSINOPHIL NFR BLD: 0.4 %
ERYTHROCYTE [DISTWIDTH] IN BLOOD BY AUTOMATED COUNT: 13.1 %
EST. GFR  (AFRICAN AMERICAN): >60 ML/MIN/1.73 M^2
EST. GFR  (NON AFRICAN AMERICAN): >60 ML/MIN/1.73 M^2
GLUCOSE SERPL-MCNC: 125 MG/DL
HCT VFR BLD AUTO: 32.6 %
HGB BLD-MCNC: 11 G/DL
LYMPHOCYTES # BLD AUTO: 1.4 K/UL
LYMPHOCYTES NFR BLD: 27.6 %
MAGNESIUM SERPL-MCNC: 1.2 MG/DL
MCH RBC QN AUTO: 29.1 PG
MCHC RBC AUTO-ENTMCNC: 33.7 G/DL
MCV RBC AUTO: 86 FL
MONOCYTES # BLD AUTO: 0.4 K/UL
MONOCYTES NFR BLD: 8 %
NEUTROPHILS # BLD AUTO: 3.3 K/UL
NEUTROPHILS NFR BLD: 63.8 %
PHOSPHATE SERPL-MCNC: 2.4 MG/DL
PLATELET # BLD AUTO: 232 K/UL
PMV BLD AUTO: 10.5 FL
POCT GLUCOSE: 110 MG/DL (ref 70–110)
POCT GLUCOSE: 191 MG/DL (ref 70–110)
POCT GLUCOSE: 224 MG/DL (ref 70–110)
POCT GLUCOSE: 282 MG/DL (ref 70–110)
POCT GLUCOSE: 310 MG/DL (ref 70–110)
POTASSIUM SERPL-SCNC: 3.3 MMOL/L
PROT SERPL-MCNC: 5.9 G/DL
RBC # BLD AUTO: 3.78 M/UL
SODIUM SERPL-SCNC: 137 MMOL/L
VANCOMYCIN TROUGH SERPL-MCNC: 8 UG/ML
WBC # BLD AUTO: 5.22 K/UL

## 2017-08-24 PROCEDURE — 85025 COMPLETE CBC W/AUTO DIFF WBC: CPT

## 2017-08-24 PROCEDURE — 63600175 PHARM REV CODE 636 W HCPCS: Performed by: INTERNAL MEDICINE

## 2017-08-24 PROCEDURE — 80202 ASSAY OF VANCOMYCIN: CPT

## 2017-08-24 PROCEDURE — 63600175 PHARM REV CODE 636 W HCPCS: Performed by: EMERGENCY MEDICINE

## 2017-08-24 PROCEDURE — 83735 ASSAY OF MAGNESIUM: CPT

## 2017-08-24 PROCEDURE — 84100 ASSAY OF PHOSPHORUS: CPT

## 2017-08-24 PROCEDURE — 36415 COLL VENOUS BLD VENIPUNCTURE: CPT

## 2017-08-24 PROCEDURE — 11000001 HC ACUTE MED/SURG PRIVATE ROOM

## 2017-08-24 PROCEDURE — 63600175 PHARM REV CODE 636 W HCPCS: Performed by: NURSE PRACTITIONER

## 2017-08-24 PROCEDURE — 25000003 PHARM REV CODE 250: Performed by: EMERGENCY MEDICINE

## 2017-08-24 PROCEDURE — 25000003 PHARM REV CODE 250: Performed by: NURSE PRACTITIONER

## 2017-08-24 PROCEDURE — 63600175 PHARM REV CODE 636 W HCPCS: Performed by: PHYSICIAN ASSISTANT

## 2017-08-24 PROCEDURE — 96372 THER/PROPH/DIAG INJ SC/IM: CPT

## 2017-08-24 PROCEDURE — 80053 COMPREHEN METABOLIC PANEL: CPT

## 2017-08-24 PROCEDURE — 25000003 PHARM REV CODE 250: Performed by: INTERNAL MEDICINE

## 2017-08-24 PROCEDURE — 99231 SBSQ HOSP IP/OBS SF/LOW 25: CPT | Mod: ,,, | Performed by: PHYSICIAN ASSISTANT

## 2017-08-24 RX ORDER — MORPHINE SULFATE 2 MG/ML
1 INJECTION, SOLUTION INTRAMUSCULAR; INTRAVENOUS ONCE AS NEEDED
Status: COMPLETED | OUTPATIENT
Start: 2017-08-24 | End: 2017-08-24

## 2017-08-24 RX ORDER — MAGNESIUM SULFATE HEPTAHYDRATE 40 MG/ML
2 INJECTION, SOLUTION INTRAVENOUS ONCE
Status: COMPLETED | OUTPATIENT
Start: 2017-08-24 | End: 2017-08-24

## 2017-08-24 RX ADMIN — INSULIN DETEMIR 75 UNITS: 100 INJECTION, SOLUTION SUBCUTANEOUS at 09:08

## 2017-08-24 RX ADMIN — CEFEPIME HYDROCHLORIDE 2 G: 2 INJECTION, SOLUTION INTRAVENOUS at 02:08

## 2017-08-24 RX ADMIN — INSULIN ASPART 2 UNITS: 100 INJECTION, SOLUTION INTRAVENOUS; SUBCUTANEOUS at 11:08

## 2017-08-24 RX ADMIN — DULOXETINE 60 MG: 30 CAPSULE, DELAYED RELEASE ORAL at 08:08

## 2017-08-24 RX ADMIN — HYDROCODONE BITARTRATE AND ACETAMINOPHEN 1 TABLET: 5; 325 TABLET ORAL at 03:08

## 2017-08-24 RX ADMIN — Medication 1000 MG: at 02:08

## 2017-08-24 RX ADMIN — OXYCODONE HYDROCHLORIDE AND ACETAMINOPHEN 1 TABLET: 10; 325 TABLET ORAL at 09:08

## 2017-08-24 RX ADMIN — POTASSIUM PHOSPHATE, MONOBASIC AND POTASSIUM PHOSPHATE, DIBASIC 15 MMOL: 224; 236 INJECTION, SOLUTION INTRAVENOUS at 11:08

## 2017-08-24 RX ADMIN — LORAZEPAM 1 MG: 0.5 TABLET ORAL at 09:08

## 2017-08-24 RX ADMIN — INSULIN ASPART 4 UNITS: 100 INJECTION, SOLUTION INTRAVENOUS; SUBCUTANEOUS at 05:08

## 2017-08-24 RX ADMIN — INSULIN DETEMIR 75 UNITS: 100 INJECTION, SOLUTION SUBCUTANEOUS at 08:08

## 2017-08-24 RX ADMIN — MAGNESIUM SULFATE IN WATER 2 G: 40 INJECTION, SOLUTION INTRAVENOUS at 10:08

## 2017-08-24 RX ADMIN — HYDROCHLOROTHIAZIDE 12.5 MG: 12.5 TABLET ORAL at 08:08

## 2017-08-24 RX ADMIN — ATORVASTATIN CALCIUM 80 MG: 40 TABLET, FILM COATED ORAL at 08:08

## 2017-08-24 RX ADMIN — INSULIN ASPART 2 UNITS: 100 INJECTION, SOLUTION INTRAVENOUS; SUBCUTANEOUS at 09:08

## 2017-08-24 RX ADMIN — ATENOLOL 100 MG: 50 TABLET ORAL at 08:08

## 2017-08-24 RX ADMIN — MORPHINE SULFATE 1 MG: 2 INJECTION, SOLUTION INTRAMUSCULAR; INTRAVENOUS at 02:08

## 2017-08-24 RX ADMIN — SODIUM CHLORIDE: 0.9 INJECTION, SOLUTION INTRAVENOUS at 09:08

## 2017-08-24 NOTE — ASSESSMENT & PLAN NOTE
Fracture of right distal metatarsal  Ortho saw the patient and recommended the following  -Right Lower Extremity WBAT  -Will need cast shoe or short boot  -patient will need to repeat Xray in 7 days

## 2017-08-24 NOTE — HOSPITAL COURSE
Hospital day 2: s/p I&D abdominal wall abscess. Wound improved. Less drainage and less odor. Cellulitis improved

## 2017-08-24 NOTE — PROGRESS NOTES
Vancomycin Progress Notes:    Scr = 0.8  improved from 1 yesterday   tmax = 99.1 F   wbc = 5.22  Trough = 8 @ 16 hrs after last dose and prior to the 3rd dose; increasing freq to q12hrs and changing dose to 1 gm  Based on pt adjusted wt of 72.2 kg should be getting 1 gm  Next trough due 8/26 @ 0100  /Hannah Aguilar [facility] 8/24/2017 1:07 PM

## 2017-08-24 NOTE — ASSESSMENT & PLAN NOTE
Uncontrolled, has not taken insulin in 2 days  HbA1C 10.5, Current BG in 400's  Resume home dose Levemir 75 units BID  Moderate dose ISS  -thoroughly counseled on risk and complications associated with hyperglycemia  -Diabetes Educator was consulted who reiterated same education  Hyperglycemia decreasing

## 2017-08-24 NOTE — PLAN OF CARE
Pt remains free of injuries. C/o RLQ abd pain, throbbing, 2-6/10. Worse c wound care and movement. Moderately controlled c PRN meds and relaxation techniques. Moderate amount of serosanguinous drainage from I/D site. Dressing changed by ALFONSO Lopes-CHARAN. Blood glucose monitored and insulin per SS. IV antibiotics and IVF per MD orders. Plan to d/c home c  for wound care. 12 hr chart check completed. Will continue to monitor.

## 2017-08-24 NOTE — PROGRESS NOTES
[facility] -   General Surgery  Progress Note    Subjective:     History of Present Illness:  Tamara Barriga is a 72 y.o. type 2 diabetic female who presented to the ED last night with a large abscess of her abdominal wall. The problem started about 1 week ago and quickly worsened. She c/o warmth, firmness, and tenderness of abdominal wall, fever, chills.  I&D was done in ED.    Post-Op Info:  * No surgery found *         Interval History: no issues    Medications:  Continuous Infusions:   sodium chloride 0.9% 125 mL/hr at 08/23/17 1441     Scheduled Meds:   atenolol  100 mg Oral Daily    atorvastatin  80 mg Oral Daily    ceFEPime (MAXIPIME) IVPB  2 g Intravenous Q12H    duloxetine  60 mg Oral Daily    insulin detemir  75 Units Subcutaneous BID    lorazepam  1 mg Oral QHS    magnesium sulfate IVPB  2 g Intravenous Once    potassium phosphate IVPB  15 mmol Intravenous Once    vancomycin (VANCOCIN) IVPB  15 mg/kg Intravenous Q18H     PRN Meds:albuterol-ipratropium 2.5mg-0.5mg/3mL, aluminum-magnesium hydroxide-simethicone, dextrose 50%, dextrose 50%, glucagon (human recombinant), glucose, glucose, hydrocodone-acetaminophen 5-325mg, insulin aspart, morphine, oxycodone-acetaminophen     Review of patient's allergies indicates:  No Known Allergies  Objective:     Vital Signs (Most Recent):  Temp: 97.8 °F (36.6 °C) (08/24/17 0851)  Pulse: 83 (08/24/17 0851)  Resp: 18 (08/24/17 0851)  BP: (!) 168/74 (08/24/17 0851)  SpO2: 98 % (08/24/17 0851) Vital Signs (24h Range):  Temp:  [97.8 °F (36.6 °C)-99.1 °F (37.3 °C)] 97.8 °F (36.6 °C)  Pulse:  [71-88] 83  Resp:  [18] 18  SpO2:  [94 %-98 %] 98 %  BP: (120-168)/(67-88) 168/74     Weight: 91.4 kg (201 lb 9.6 oz)  Body mass index is 32.54 kg/m².    Intake/Output - Last 3 Shifts       08/22 0700 - 08/23 0659 08/23 0700 - 08/24 0659 08/24 0700 - 08/25 0659    P.O.  600     I.V. (mL/kg)  3175 (34.7)     IV Piggyback  350     Total Intake(mL/kg)  4125 (45.1)      Urine (mL/kg/hr)  150 (0.1)     Stool  0 (0)     Total Output   150      Net   +3975             Urine Occurrence  3 x     Stool Occurrence  0 x           Physical Exam   Constitutional: She appears well-developed and well-nourished.   HENT:   Head: Normocephalic and atraumatic.   Eyes: EOM are normal.   Cardiovascular: Normal rate and regular rhythm.    Pulmonary/Chest: Effort normal. No respiratory distress.   Abdominal: Soft.   Abscess improved with less drainage and less odor. Cellulitis improved.   Musculoskeletal: Normal range of motion.   Skin: Skin is warm and dry.   Psychiatric: She has a normal mood and affect. Thought content normal.   Vitals reviewed.      Significant Labs:  CBC:   Recent Labs  Lab 08/24/17 0432   WBC 5.22   RBC 3.78*   HGB 11.0*   HCT 32.6*      MCV 86   MCH 29.1   MCHC 33.7     BMP:   Recent Labs  Lab 08/24/17 0432   *      K 3.3*      CO2 24   BUN 10   CREATININE 0.8   CALCIUM 8.6*   MG 1.2*     CMP:   Recent Labs  Lab 08/24/17 0432   *   CALCIUM 8.6*   ALBUMIN 2.6*   PROT 5.9*      K 3.3*   CO2 24      BUN 10   CREATININE 0.8   ALKPHOS 117   ALT 15   AST 13   BILITOT 0.5       Significant Diagnostics:  I have reviewed all pertinent imaging results/findings within the past 24 hours.    Assessment/Plan:     Closed fracture of phalanx of right fifth toe    Agree with ortho/podiatry consult        Type 2 diabetes mellitus with hyperglycemia    Glucose improved- continue management per primary team        Abdominal wall abscess    S/p I&D in ER. Appears adequately drained and is improving.   Recommend continue local wound care with damp gauze packed into wound, cover with dry gauze and secure with tape.   Consider home health upon discharge for wound care.           Essential hypertension    Continue management per primary team        * Abdominal Wall Cellulitis and Abscess    Adequately drained abscess   Continue wound care per nurse or  wound care nurse.   Home health referral for wound care              Bernice Gale PA-C  General Surgery  [facility]

## 2017-08-24 NOTE — PROGRESS NOTES
"[facility] Hospital Medicine  Progress Note    Patient Name: Tamara Barriga  MRN: [id number]  Patient Class: IP- Inpatient   Admission Date: 8/22/2017  Length of Stay: 1 days  Attending Physician: Kiran Kimble, *  Primary Care Provider: CHAR BARRIOS MD        Subjective:     Principal Problem:Sepsis    HPI:  Ms. Barriga is a 71 y/o  female with h/o HTN, T2DM insulin dependant, has been suffering with a right abdominal wall redness/abscess for the past one week. She sought medical attention 2 days ago, was prescribed bactrim, but patient hasn't gotten it filled and did not take the antibiotic yet. Today she presented to the ED with fever, chills, worsening right abdominal wall swelling. I&D was performed int he ED, large amount of purulent drainage per . Wound and blood cultures obtained. Temp 101, , WBC 11.7. Patient received NS 30 ml/kg IV bolus int he ED. Lactic acid 1.0. Started on NS at 125 cc/hr. Glucose elevated in 400's, has not taken insulin in 2 days ("I ran out of my insulin"). HbA1C 10.5.    Also c/o right foot pain, XR shows acute obliquely oriented fracture involving the distal shaft/neck of the 5th metatarsal.    Admitted for sepsis due to right abdominal wall abscess. Received Vanc/Cefepime in the ED.    Hospital Course:  72 year old female admitted for sepsis due to abdominal cellulitis and abscess. Incidentally, she was noted to have a 5th distal metatarsal fracture from fall that occurred prior to admittance. She underwent a bedside I&D in ED. General Surgery saw the patient and felt that abscess had been adequately drained and advised local wound care and packing. Cellulitis had largely improved. Wound care has been consulted for home health management. Pt remained afebrile x 24 hours and preliminary blood/wound cultures indicated NGTD. IV antibiotics given for Vanco and Zosyn. Pt's Hgb A1C = 10.5 and diabetic nurse educator was " consulted who had a lenghty discussion regarding setting goals and making manageable improvements in her diabetes care. Orthopedics saw the patient regarding foot fracture and it was recommended for pt to weight bear as tolerated, repeat xray in 7 days and soft cast shoe vs boot. Wound care nurse saw the patient and dressing changed.       Interval History:     Pt states she is feeling much better today. She denies pain to the abdominal site presently.  Blood glucose was 325 last night but improving. She received detemir 75 units last night. Likely discharge tomorrow 8/25/17 if remains afebrile and cultures have returned. Home health was ordered.     Review of Systems   Constitutional: Negative for activity change, chills and fever.   Respiratory: Negative for shortness of breath.    Cardiovascular: Negative for chest pain.   Gastrointestinal: Negative for abdominal pain, nausea and vomiting.        Pain has decreased   Genitourinary: Negative for dysuria.   Musculoskeletal: Negative for myalgias.   Skin: Negative for wound.   Neurological: Negative for weakness.   Hematological: Does not bruise/bleed easily.   Psychiatric/Behavioral: The patient is not nervous/anxious.      Objective:     Vital Signs (Most Recent):  Temp: 97.8 °F (36.6 °C) (08/24/17 0851)  Pulse: 83 (08/24/17 0851)  Resp: 18 (08/24/17 0851)  BP: (!) 168/74 (08/24/17 0851)  SpO2: 98 % (08/24/17 0851) Vital Signs (24h Range):  Temp:  [97.8 °F (36.6 °C)-99.1 °F (37.3 °C)] 97.8 °F (36.6 °C)  Pulse:  [71-88] 83  Resp:  [18] 18  SpO2:  [94 %-98 %] 98 %  BP: (120-168)/(67-88) 168/74     Weight: 91.4 kg (201 lb 9.6 oz)  Body mass index is 32.54 kg/m².    Intake/Output Summary (Last 24 hours) at 08/24/17 1129  Last data filed at 08/24/17 1038   Gross per 24 hour   Intake          4874.17 ml   Output              150 ml   Net          4724.17 ml      Physical Exam   Constitutional: She is oriented to person, place, and time. She appears well-developed and  well-nourished.   obese   HENT:   Head: Normocephalic and atraumatic.   Eyes: Conjunctivae and EOM are normal.   Neck: Normal range of motion. Neck supple.   Cardiovascular: Normal rate, regular rhythm and normal heart sounds.    Pulmonary/Chest: Effort normal and breath sounds normal. No respiratory distress.   Abdominal: Soft. There is tenderness (surrounding previous abscess).    S/p I&D with approx 3 cm horizontal incision present at site with packing.    Musculoskeletal: Normal range of motion.   Neurological: She is alert and oriented to person, place, and time.   Skin: Skin is warm and dry.   Abdominal wound   Psychiatric: She has a normal mood and affect. Thought content normal.   Nursing note and vitals reviewed.      Significant Labs:   Blood Culture:   Recent Labs  Lab 08/23/17  0030 08/23/17  0035   LABBLOO No Growth to date  No Growth to date No Growth to date  No Growth to date     CBC:   Recent Labs  Lab 08/22/17  1626 08/23/17  0000 08/24/17  0432   WBC 12.40 11.71 5.22   HGB 13.5 13.0 11.0*   HCT 38.9 38.2 32.6*    273 232     CMP:   Recent Labs  Lab 08/22/17  1626 08/23/17  0000 08/24/17  0432    138 137   K 3.9 4.1 3.3*   CL 99 100 104   CO2 28 25 24   * 444* 125*   BUN 11 14 10   CREATININE 1.1 1.0 0.8   CALCIUM 10.3 10.0 8.6*   PROT 7.6 7.0 5.9*   ALBUMIN 3.4* 3.1* 2.6*   BILITOT 0.6 0.6 0.5   ALKPHOS 127 112 117   AST 8* 14 13   ALT 12 11 15   ANIONGAP 12 13 9   EGFRNONAA 51.0* 57* >60     All pertinent labs within the past 24 hours have been reviewed.    Significant Imaging: I have reviewed all pertinent imaging results/findings within the past 24 hours.    Assessment/Plan:      * Abdominal Wall Cellulitis and Abscess      -patient did not fail therapy as she never started Bactrim upon.   -S/p I&D on 8/22/17 removing large amount of purulent drainage.   -continue IV Vanc and IV cefepime  -Seen by General Surgery today who repacked dressing and did not rec further surgical  intervention.  -Given history of noncompliance with medications, will need to continue IV antibiotics for additional day and improve glucose to ensure adequate healing.   -wound care consult in AM to re-evaluate wound and teach wound dressing.   Afebrile since 8/23/17 4 AM to 8/24/17 4 AM - will monitor  Preliminary wound/blood cultures NGTD  Proposed discharge for 8/25/17 if patient afebrile, culture results and home health has been arranged        Type 2 diabetes mellitus with hyperglycemia    Uncontrolled, has not taken insulin in 2 days  HbA1C 10.5, Current BG in 400's  Resume home dose Levemir 75 units BID  Moderate dose ISS  -thoroughly counseled on risk and complications associated with hyperglycemia  -Diabetes Educator was consulted who reiterated same education  Hyperglycemia decreasing            Closed fracture of phalanx of right fifth toe    Fracture of right distal metatarsal  Ortho saw the patient and recommended the following  -Right Lower Extremity WBAT  -Will need cast shoe or short boot  -patient will need to repeat Xray in 7 days         Hypophosphatasia    Replete and monitor          Hypomagnesemia    Replete and monitor          Hypokalemia    Replete and monitor          Tachycardia      -quickly improved after IVF's.   -likely related to dehydration from hyperglycemia        Abdominal wall abscess    s/p I&D in the ED.  Follow up with wound and blood cultures.  Continue IV cefepime, Vancomycin  Surgery consult          Essential hypertension    Resume home medications.  Well controlled at this time.            VTE Risk Mitigation         Ordered     Place sequential compression device  Until discontinued      08/23/17 0438     Low Risk of VTE  Once      08/23/17 0420              Shelby Choudhary NP  Department of Hospital Medicine   [facility]

## 2017-08-24 NOTE — ASSESSMENT & PLAN NOTE
-patient did not fail therapy as she never started Bactrim upon.   -S/p I&D on 8/22/17 removing large amount of purulent drainage.   -continue IV Vanc and IV cefepime  -Seen by General Surgery today who repacked dressing and did not rec further surgical intervention.  -Given history of noncompliance with medications, will need to continue IV antibiotics for additional day and improve glucose to ensure adequate healing.   -wound care consult in AM to re-evaluate wound and teach wound dressing.   Afebrile since 8/23/17 4 AM to 8/24/17 4 AM - will monitor  Preliminary wound/blood cultures NGTD  Proposed discharge for 8/25/17 if patient afebrile, culture results and home has been arranged

## 2017-08-24 NOTE — SUBJECTIVE & OBJECTIVE
Interval History: no issues    Medications:  Continuous Infusions:   sodium chloride 0.9% 125 mL/hr at 08/23/17 1441     Scheduled Meds:   atenolol  100 mg Oral Daily    atorvastatin  80 mg Oral Daily    ceFEPime (MAXIPIME) IVPB  2 g Intravenous Q12H    duloxetine  60 mg Oral Daily    insulin detemir  75 Units Subcutaneous BID    lorazepam  1 mg Oral QHS    magnesium sulfate IVPB  2 g Intravenous Once    potassium phosphate IVPB  15 mmol Intravenous Once    vancomycin (VANCOCIN) IVPB  15 mg/kg Intravenous Q18H     PRN Meds:albuterol-ipratropium 2.5mg-0.5mg/3mL, aluminum-magnesium hydroxide-simethicone, dextrose 50%, dextrose 50%, glucagon (human recombinant), glucose, glucose, hydrocodone-acetaminophen 5-325mg, insulin aspart, morphine, oxycodone-acetaminophen     Review of patient's allergies indicates:  No Known Allergies  Objective:     Vital Signs (Most Recent):  Temp: 97.8 °F (36.6 °C) (08/24/17 0851)  Pulse: 83 (08/24/17 0851)  Resp: 18 (08/24/17 0851)  BP: (!) 168/74 (08/24/17 0851)  SpO2: 98 % (08/24/17 0851) Vital Signs (24h Range):  Temp:  [97.8 °F (36.6 °C)-99.1 °F (37.3 °C)] 97.8 °F (36.6 °C)  Pulse:  [71-88] 83  Resp:  [18] 18  SpO2:  [94 %-98 %] 98 %  BP: (120-168)/(67-88) 168/74     Weight: 91.4 kg (201 lb 9.6 oz)  Body mass index is 32.54 kg/m².    Intake/Output - Last 3 Shifts       08/22 0700 - 08/23 0659 08/23 0700 - 08/24 0659 08/24 0700 - 08/25 0659    P.O.  600     I.V. (mL/kg)  3175 (34.7)     IV Piggyback  350     Total Intake(mL/kg)  4125 (45.1)     Urine (mL/kg/hr)  150 (0.1)     Stool  0 (0)     Total Output   150      Net   +3975             Urine Occurrence  3 x     Stool Occurrence  0 x           Physical Exam   Constitutional: She appears well-developed and well-nourished.   HENT:   Head: Normocephalic and atraumatic.   Eyes: EOM are normal.   Cardiovascular: Normal rate and regular rhythm.    Pulmonary/Chest: Effort normal. No respiratory distress.   Abdominal: Soft.    Abscess improved with less drainage and less odor. Cellulitis improved.   Musculoskeletal: Normal range of motion.   Skin: Skin is warm and dry.   Psychiatric: She has a normal mood and affect. Thought content normal.   Vitals reviewed.      Significant Labs:  CBC:   Recent Labs  Lab 08/24/17 0432   WBC 5.22   RBC 3.78*   HGB 11.0*   HCT 32.6*      MCV 86   MCH 29.1   MCHC 33.7     BMP:   Recent Labs  Lab 08/24/17 0432   *      K 3.3*      CO2 24   BUN 10   CREATININE 0.8   CALCIUM 8.6*   MG 1.2*     CMP:   Recent Labs  Lab 08/24/17 0432   *   CALCIUM 8.6*   ALBUMIN 2.6*   PROT 5.9*      K 3.3*   CO2 24      BUN 10   CREATININE 0.8   ALKPHOS 117   ALT 15   AST 13   BILITOT 0.5       Significant Diagnostics:  I have reviewed all pertinent imaging results/findings within the past 24 hours.

## 2017-08-24 NOTE — ASSESSMENT & PLAN NOTE
Adequately drained abscess   Continue wound care per nurse or wound care nurse.   Home health referral for wound care

## 2017-08-24 NOTE — SUBJECTIVE & OBJECTIVE
Interval History:     Pt states she is feeling much better today. She denies pain to the abdominal site presently.  Blood glucose was 325 last night but improving. She received detemir 75 units last night. Likely discharge tomorrow 8/25/17 if remains afebrile and cultures have returned. Home health was ordered.     Review of Systems   Constitutional: Negative for activity change, chills and fever.   Respiratory: Negative for shortness of breath.    Cardiovascular: Negative for chest pain.   Gastrointestinal: Negative for abdominal pain, nausea and vomiting.        Pain has decreased   Genitourinary: Negative for dysuria.   Musculoskeletal: Negative for myalgias.   Skin: Negative for wound.   Neurological: Negative for weakness.   Hematological: Does not bruise/bleed easily.   Psychiatric/Behavioral: The patient is not nervous/anxious.      Objective:     Vital Signs (Most Recent):  Temp: 97.8 °F (36.6 °C) (08/24/17 0851)  Pulse: 83 (08/24/17 0851)  Resp: 18 (08/24/17 0851)  BP: (!) 168/74 (08/24/17 0851)  SpO2: 98 % (08/24/17 0851) Vital Signs (24h Range):  Temp:  [97.8 °F (36.6 °C)-99.1 °F (37.3 °C)] 97.8 °F (36.6 °C)  Pulse:  [71-88] 83  Resp:  [18] 18  SpO2:  [94 %-98 %] 98 %  BP: (120-168)/(67-88) 168/74     Weight: 91.4 kg (201 lb 9.6 oz)  Body mass index is 32.54 kg/m².    Intake/Output Summary (Last 24 hours) at 08/24/17 1129  Last data filed at 08/24/17 1038   Gross per 24 hour   Intake          4874.17 ml   Output              150 ml   Net          4724.17 ml      Physical Exam   Constitutional: She is oriented to person, place, and time. She appears well-developed and well-nourished.   obese   HENT:   Head: Normocephalic and atraumatic.   Eyes: Conjunctivae and EOM are normal.   Neck: Normal range of motion. Neck supple.   Cardiovascular: Normal rate, regular rhythm and normal heart sounds.    Pulmonary/Chest: Effort normal and breath sounds normal. No respiratory distress.   Abdominal: Soft. There is  tenderness (surrounding previous abscess).    S/p I&D with approx 3 cm horizontal incision present at site with packing.    Musculoskeletal: Normal range of motion.   Neurological: She is alert and oriented to person, place, and time.   Skin: Skin is warm and dry.   Abdominal wound   Psychiatric: She has a normal mood and affect. Thought content normal.   Nursing note and vitals reviewed.      Significant Labs:   Blood Culture:   Recent Labs  Lab 08/23/17  0030 08/23/17  0035   LABBLOO No Growth to date  No Growth to date No Growth to date  No Growth to date     CBC:   Recent Labs  Lab 08/22/17  1626 08/23/17  0000 08/24/17  0432   WBC 12.40 11.71 5.22   HGB 13.5 13.0 11.0*   HCT 38.9 38.2 32.6*    273 232     CMP:   Recent Labs  Lab 08/22/17  1626 08/23/17  0000 08/24/17  0432    138 137   K 3.9 4.1 3.3*   CL 99 100 104   CO2 28 25 24   * 444* 125*   BUN 11 14 10   CREATININE 1.1 1.0 0.8   CALCIUM 10.3 10.0 8.6*   PROT 7.6 7.0 5.9*   ALBUMIN 3.4* 3.1* 2.6*   BILITOT 0.6 0.6 0.5   ALKPHOS 127 112 117   AST 8* 14 13   ALT 12 11 15   ANIONGAP 12 13 9   EGFRNONAA 51.0* 57* >60     All pertinent labs within the past 24 hours have been reviewed.    Significant Imaging: I have reviewed all pertinent imaging results/findings within the past 24 hours.

## 2017-08-24 NOTE — CONSULTS
Consulted on this 73 y/o F patient for evaluation and treatment plan for I&D wound to RUQ.  Dressing removed with tan colored foul smelling drainage noted on outer dressing gauze packing removed, wound is malodorous. Measures 1x3x3 cm. Janeth wound skin with redness and induration noted extending 3 cm.  Wound bed pink and moist, purulent drainage noted.  Wound irrigated with sterile normal saline, intact janeth wound skin painted with cavilon skin barrier.  Wound filled with Aquacel AG rope.  Covered with folded 4x4s and secured with covaderm dressing.  Patient was premedicated for pain by primary nurse ALFRED Russell RN.  Patient tolerated wound care well.  Please see below for wound care recommendations:    RUQ abdominal I&D site:  1. Irrigate wound with sterile normal saline  2. Pat dry  3. Paint intact janeth wound skin with cavilon  4. Fill wound with Aquacel AG rope 2x45cm  5. Cover with folded 4x4 gauze  6. Secure with Covaderm dressing or ABD pad and paper tape  7. If soiled, change outer dressing  8. Change dressing (including packing) every M/W/F with first dressing change 8/28/17

## 2017-08-25 VITALS
SYSTOLIC BLOOD PRESSURE: 150 MMHG | BODY MASS INDEX: 32.41 KG/M2 | DIASTOLIC BLOOD PRESSURE: 78 MMHG | TEMPERATURE: 98 F | HEART RATE: 76 BPM | RESPIRATION RATE: 18 BRPM | OXYGEN SATURATION: 99 % | HEIGHT: 66 IN | WEIGHT: 201.63 LBS

## 2017-08-25 PROBLEM — E87.6 HYPOKALEMIA: Status: RESOLVED | Noted: 2017-08-24 | Resolved: 2017-08-25

## 2017-08-25 PROBLEM — E83.39 HYPOPHOSPHATASIA: Status: RESOLVED | Noted: 2017-08-24 | Resolved: 2017-08-25

## 2017-08-25 PROBLEM — E83.42 HYPOMAGNESEMIA: Status: RESOLVED | Noted: 2017-08-24 | Resolved: 2017-08-25

## 2017-08-25 PROBLEM — R00.0 TACHYCARDIA: Status: RESOLVED | Noted: 2017-08-23 | Resolved: 2017-08-25

## 2017-08-25 PROBLEM — L02.211 ABDOMINAL WALL ABSCESS: Status: RESOLVED | Noted: 2017-08-23 | Resolved: 2017-08-25

## 2017-08-25 LAB
ALBUMIN SERPL BCP-MCNC: 2.5 G/DL
ALP SERPL-CCNC: 221 U/L
ALT SERPL W/O P-5'-P-CCNC: 60 U/L
ANION GAP SERPL CALC-SCNC: 8 MMOL/L
AST SERPL-CCNC: 137 U/L
BACTERIA SPEC AEROBE CULT: NORMAL
BASOPHILS # BLD AUTO: 0.01 K/UL
BASOPHILS NFR BLD: 0.2 %
BILIRUB SERPL-MCNC: 0.5 MG/DL
BUN SERPL-MCNC: 8 MG/DL
CALCIUM SERPL-MCNC: 9.1 MG/DL
CHLORIDE SERPL-SCNC: 106 MMOL/L
CO2 SERPL-SCNC: 27 MMOL/L
CREAT SERPL-MCNC: 0.8 MG/DL
DIFFERENTIAL METHOD: ABNORMAL
EOSINOPHIL # BLD AUTO: 0.1 K/UL
EOSINOPHIL NFR BLD: 1.5 %
ERYTHROCYTE [DISTWIDTH] IN BLOOD BY AUTOMATED COUNT: 12.9 %
EST. GFR  (AFRICAN AMERICAN): >60 ML/MIN/1.73 M^2
EST. GFR  (NON AFRICAN AMERICAN): >60 ML/MIN/1.73 M^2
GLUCOSE SERPL-MCNC: 101 MG/DL
HCT VFR BLD AUTO: 34.3 %
HGB BLD-MCNC: 11.4 G/DL
LYMPHOCYTES # BLD AUTO: 2.1 K/UL
LYMPHOCYTES NFR BLD: 35.4 %
MAGNESIUM SERPL-MCNC: 1.7 MG/DL
MCH RBC QN AUTO: 29 PG
MCHC RBC AUTO-ENTMCNC: 33.2 G/DL
MCV RBC AUTO: 87 FL
MONOCYTES # BLD AUTO: 0.7 K/UL
MONOCYTES NFR BLD: 11.1 %
NEUTROPHILS # BLD AUTO: 3.1 K/UL
NEUTROPHILS NFR BLD: 51.8 %
PHOSPHATE SERPL-MCNC: 3.9 MG/DL
PLATELET # BLD AUTO: 229 K/UL
PMV BLD AUTO: 10.1 FL
POCT GLUCOSE: 216 MG/DL (ref 70–110)
POCT GLUCOSE: 230 MG/DL (ref 70–110)
POCT GLUCOSE: 244 MG/DL (ref 70–110)
POCT GLUCOSE: 78 MG/DL (ref 70–110)
POCT GLUCOSE: 98 MG/DL (ref 70–110)
POTASSIUM SERPL-SCNC: 4.2 MMOL/L
PROT SERPL-MCNC: 6.1 G/DL
RBC # BLD AUTO: 3.93 M/UL
SODIUM SERPL-SCNC: 141 MMOL/L
WBC # BLD AUTO: 6.05 K/UL

## 2017-08-25 PROCEDURE — 96372 THER/PROPH/DIAG INJ SC/IM: CPT

## 2017-08-25 PROCEDURE — 83735 ASSAY OF MAGNESIUM: CPT

## 2017-08-25 PROCEDURE — 85025 COMPLETE CBC W/AUTO DIFF WBC: CPT

## 2017-08-25 PROCEDURE — 80053 COMPREHEN METABOLIC PANEL: CPT

## 2017-08-25 PROCEDURE — 25000003 PHARM REV CODE 250: Performed by: EMERGENCY MEDICINE

## 2017-08-25 PROCEDURE — 63600175 PHARM REV CODE 636 W HCPCS: Performed by: EMERGENCY MEDICINE

## 2017-08-25 PROCEDURE — 63600175 PHARM REV CODE 636 W HCPCS: Performed by: INTERNAL MEDICINE

## 2017-08-25 PROCEDURE — 25000003 PHARM REV CODE 250: Performed by: INTERNAL MEDICINE

## 2017-08-25 PROCEDURE — 84100 ASSAY OF PHOSPHORUS: CPT

## 2017-08-25 RX ORDER — SULFAMETHOXAZOLE AND TRIMETHOPRIM 800; 160 MG/1; MG/1
1 TABLET ORAL 2 TIMES DAILY
Qty: 20 TABLET | Refills: 0 | Status: SHIPPED | OUTPATIENT
Start: 2017-08-25 | End: 2017-10-19 | Stop reason: ALTCHOICE

## 2017-08-25 RX ORDER — INSULIN ASPART 100 [IU]/ML
INJECTION, SOLUTION INTRAVENOUS; SUBCUTANEOUS
Qty: 1 BOX | Refills: 0 | Status: SHIPPED | OUTPATIENT
Start: 2017-08-25 | End: 2017-08-25

## 2017-08-25 RX ORDER — HYDROCODONE BITARTRATE AND ACETAMINOPHEN 7.5; 325 MG/1; MG/1
1 TABLET ORAL
Qty: 12 TABLET | Refills: 0 | Status: SHIPPED | OUTPATIENT
Start: 2017-08-25 | End: 2017-10-19 | Stop reason: ALTCHOICE

## 2017-08-25 RX ORDER — SULFAMETHOXAZOLE AND TRIMETHOPRIM 800; 160 MG/1; MG/1
1 TABLET ORAL 2 TIMES DAILY
Qty: 20 TABLET | Refills: 0 | Status: SHIPPED | OUTPATIENT
Start: 2017-08-25 | End: 2017-08-25

## 2017-08-25 RX ORDER — INSULIN ASPART 100 [IU]/ML
INJECTION, SOLUTION INTRAVENOUS; SUBCUTANEOUS
Qty: 1 BOX | Refills: 0 | Status: SHIPPED | OUTPATIENT
Start: 2017-08-25 | End: 2017-12-07 | Stop reason: SDUPTHER

## 2017-08-25 RX ORDER — AMOXICILLIN AND CLAVULANATE POTASSIUM 875; 125 MG/1; MG/1
1 TABLET, FILM COATED ORAL 2 TIMES DAILY
Qty: 20 TABLET | Refills: 0 | Status: SHIPPED | OUTPATIENT
Start: 2017-08-25 | End: 2017-09-04

## 2017-08-25 RX ORDER — AMOXICILLIN AND CLAVULANATE POTASSIUM 875; 125 MG/1; MG/1
1 TABLET, FILM COATED ORAL 2 TIMES DAILY
Qty: 20 TABLET | Refills: 0 | Status: SHIPPED | OUTPATIENT
Start: 2017-08-25 | End: 2017-08-25

## 2017-08-25 RX ADMIN — ATENOLOL 100 MG: 50 TABLET ORAL at 08:08

## 2017-08-25 RX ADMIN — OXYCODONE HYDROCHLORIDE AND ACETAMINOPHEN 1 TABLET: 10; 325 TABLET ORAL at 05:08

## 2017-08-25 RX ADMIN — DULOXETINE 60 MG: 30 CAPSULE, DELAYED RELEASE ORAL at 08:08

## 2017-08-25 RX ADMIN — INSULIN DETEMIR 75 UNITS: 100 INJECTION, SOLUTION SUBCUTANEOUS at 08:08

## 2017-08-25 RX ADMIN — Medication 1000 MG: at 02:08

## 2017-08-25 RX ADMIN — ATORVASTATIN CALCIUM 80 MG: 40 TABLET, FILM COATED ORAL at 08:08

## 2017-08-25 RX ADMIN — CEFEPIME HYDROCHLORIDE 2 G: 2 INJECTION, SOLUTION INTRAVENOUS at 02:08

## 2017-08-25 RX ADMIN — INSULIN ASPART 4 UNITS: 100 INJECTION, SOLUTION INTRAVENOUS; SUBCUTANEOUS at 10:08

## 2017-08-25 NOTE — DISCHARGE SUMMARY
"[facility] Hospital Medicine  Discharge Summary      Patient Name: Tamara Barriga  MRN: [id number]  Admission Date: 8/22/2017  Hospital Length of Stay: 2 days  Discharge Date and Time:  08/25/2017 6:24 PM  Attending Physician: Kiran Kimble, *   Discharging Provider: Shelby Choudhary NP  Primary Care Provider: CHAR BARRIOS MD      HPI:   Ms. Barriga is a 71 y/o  female with h/o HTN, T2DM insulin dependant, has been suffering with a right abdominal wall redness/abscess for the past one week. She sought medical attention 2 days ago, was prescribed bactrim, but patient hasn't gotten it filled and did not take the antibiotic yet. Today she presented to the ED with fever, chills, worsening right abdominal wall swelling. I&D was performed int he ED, large amount of purulent drainage per . Wound and blood cultures obtained. Temp 101, , WBC 11.7. Patient received NS 30 ml/kg IV bolus int he ED. Lactic acid 1.0. Started on NS at 125 cc/hr. Glucose elevated in 400's, has not taken insulin in 2 days ("I ran out of my insulin"). HbA1C 10.5.    Also c/o right foot pain, XR shows acute obliquely oriented fracture involving the distal shaft/neck of the 5th metatarsal.    Admitted for sepsis due to right abdominal wall abscess. Received Vanc/Cefepime in the ED.    * No surgery found *      Indwelling Lines/Drains at time of discharge:   Lines/Drains/Airways          No matching active lines, drains, or airways        Hospital Course:   72 year old female admitted for sepsis due to abdominal cellulitis and abscess. Incidentally, she was noted to have a 5th distal metatarsal fracture from fall that occurred prior to admittance. She underwent a bedside I&D in ED. General Surgery saw the patient and felt that abscess had been adequately drained and advised local wound care and packing. Cellulitis had largely improved. Wound care has been consulted for home health " management. Pt remained afebrile x 24 hours and preliminary blood/wound cultures indicated NGTD. IV antibiotics given for Vanco and Zosyn. Pt's Hgb A1C = 10.5 and diabetic nurse educator was consulted who had a lenghty discussion regarding setting goals and making manageable improvements in her diabetes care. Orthopedics saw the patient regarding foot fracture and it was recommended for pt to weight bear as tolerated, repeat xray in 7 days and soft cast shoe vs boot. Wound care nurse saw the patient and dressing changed. Pt was afebrile for greater than 24 hours. Blood cultures were NGTD and wound culture grew Streptococcus intermedius. Blood sugars were controlled. After 3 days of IV ABX, pt was discharged to home with home health services for dressing changes. She was prescribed Augmentin and Bactrim. She was advised to control blood sugars, follow up with PCP and Orthopedics. Also, pt wearing soft boot for right fifth metatarsal fracture. She was advised to follow up with orthopedics.Pt was seen and examined and determined to be safe and stable for discharge.        Consults:   Consults         Status Ordering Provider     Inpatient consult to Diabetes educator  Once     Provider:  (Not yet assigned)    Completed KELSEY VASQUEZ     Inpatient consult to General Surgery  Once     Provider:  Louis O. Jeansonne IV, MD    Completed MACARENA MILLS     Inpatient consult to Orthopedic Surgery  Once     Provider:  Jeff Mason MD    Completed MACARENA MILLS     Pharmacy to dose Vancomycin consult  Once     Provider:  (Not yet assigned)    Acknowledged MACARENA MILLS          Significant Diagnostic Studies:   Imaging Results          X-Ray Knee 3 View Right (Final result)  Result time 08/25/17 15:57:20    Final result by BRANDY Renteria Sr., MD (08/25/17 15:57:20)                 Impression:      1. There is a small joint effusion in the right knee.   2. There are several calcifications anteromedial to the distal  metadiaphyseal portion of the right femur. One of the larger ones measures 9 mm in size. This is characteristic of prior trauma.        Electronically signed by: BRANDY MORALES MD  Date:     08/25/17  Time:    15:57              Narrative:    4 view x-ray of the right knee    Clinical History:     Pain in the right knee status post fall    Findings:     There is no fracture. There is no dislocation. There is a small joint effusion in the right knee. There are several calcifications anteromedial to the distal metadiaphyseal portion of the right femur. One of the larger ones measures 9 mm in size.                              Pending Diagnostic Studies:     None        Final Active Diagnoses:    Diagnosis Date Noted POA    PRINCIPAL PROBLEM:  Abdominal Wall Cellulitis and Abscess [A41.9] 08/23/2017 Yes    Type 2 diabetes mellitus with hyperglycemia [E11.65] 08/23/2017 Yes    Closed fracture of phalanx of right fifth toe [S92.501A] 08/23/2017 Yes    Essential hypertension [I10] 04/19/2017 Yes      Problems Resolved During this Admission:    Diagnosis Date Noted Date Resolved POA    Hypokalemia [E87.6] 08/24/2017 08/25/2017 No    Hypomagnesemia [E83.42] 08/24/2017 08/25/2017 No    Hypophosphatasia [E83.39] 08/24/2017 08/25/2017 No    Abdominal wall abscess [L02.211] 08/23/2017 08/25/2017 Yes    Tachycardia [R00.0] 08/23/2017 08/25/2017 Yes      No new Assessment & Plan notes have been filed under this hospital service since the last note was generated.  Service: Hospital Medicine      Discharged Condition: stable    Disposition: Home-Health Care [facility]    Follow Up:  Follow-up Information     CHAR BARRIOS MD In 3 days.    Specialty:  Family Medicine  Why:  Hosp F/U - Abdominal wall cellulitis with abscess and hyperglycemia  Contact information:  [address] [place] [address] [id number] [phone]             Bernice Gale PA-C In 1 week.    Specialty:  General Surgery  Why:  Hosp F/U - Abdominal wall  abscess - follow up regarding wound healing  Contact information:  [address] [place] [address]  [phone]             [facility].    Specialty:  Home Health Services  Why:  Home Health:  SN and HHA  Contact information:  [address]  [address]  [place] [address]  [phone]                 Patient Instructions:     Diet general     Activity as tolerated     Call MD for:  temperature >100.4     Call MD for:  redness, tenderness, or signs of infection (pain, swelling, redness, odor or green/yellow discharge around incision site)       Medications:  Reconciled Home Medications:   Current Discharge Medication List      START taking these medications    Details   amoxicillin-clavulanate 875-125mg (AUGMENTIN) 875-125 mg per tablet Take 1 tablet by mouth 2 (two) times daily.  Qty: 20 tablet, Refills: 0      hydrocodone-acetaminophen 7.5-325mg (NORCO) 7.5-325 mg per tablet Take 1 tablet by mouth every 4 to 6 hours as needed for Pain.  Qty: 12 tablet, Refills: 0         CONTINUE these medications which have CHANGED    Details   insulin aspart (NOVOLOG FLEXPEN) 100 unit/mL InPn pen 25 units sq  before lunch  Qty: 1 Box, Refills: 0      insulin detemir (LEVEMIR FLEXTOUCH) 100 unit/mL (3 mL) SubQ InPn pen 75 units sq BID  Qty: 1 Box, Refills: 0      sulfamethoxazole-trimethoprim 800-160mg (BACTRIM DS) 800-160 mg Tab Take 1 tablet by mouth 2 (two) times daily.  Qty: 20 tablet, Refills: 0         CONTINUE these medications which have NOT CHANGED    Details   atenolol (TENORMIN) 100 MG tablet Take 100 mg by mouth once daily.      atorvastatin (LIPITOR) 80 MG tablet TAKE 1 TABLET BY MOUTH EVERY DAY  Qty: 30 tablet, Refills: 0      duloxetine (CYMBALTA) 60 MG capsule Take 60 mg by mouth 2 (two) times daily.       gabapentin (NEURONTIN) 300 MG capsule Take 300 mg by mouth every evening.      lorazepam (ATIVAN) 1 MG tablet Take 1 tablet (1 mg total) by mouth every evening.  Qty:  90 tablet, Refills: 0      metformin (GLUCOPHAGE) 1000 MG tablet TAKE 1 TABLET BY MOUTH TWICE A DAY WITH MEALS  Qty: 60 tablet, Refills: 0      telmisartan-hydrochlorothiazide (MICARDIS HCT) 40-12.5 mg per tablet Take 1 tablet by mouth once daily.      hydrocodone-acetaminophen 5-325mg (NORCO) 5-325 mg per tablet Take 1 tablet by mouth every 6 (six) hours as needed for Pain.  Qty: 12 tablet, Refills: 0           Time spent on the discharge of patient: > 30 minutes    HOS POC IP DISCHARGE SUMMARY    Shelby Choudhary NP  Department of Hospital Medicine  [facility]

## 2017-08-25 NOTE — PLAN OF CARE
Patient discharging today and has been ordered  SN, PT, HHA services.  Met with patient at bedside.  Patient reports being receptive to these  services being ordered for her and indicates HH preference as [facility].  Patient Preference form signed reflecting this and placed in patient's blue folder.  Patient reports having no other needs or concerns at this time.      Contacted Shelby with [facility]  and made referral verbally, informing her that patient will be discharging home today with above-ordered services.  Also, faxed  order and patient information to [facility]  via [facility] to complete referral.       D/C PLAN:  Home with son and daughter-in-law, with  SN, PT, HHA services via [facility]       08/25/17 3724   Final Note   Assessment Type Final Discharge Note   Discharge Disposition Home-Health  ( SN, PT, HHA)   What phone number can be called within the next 1-3 days to see how you are doing after discharge? [phone]   Discharge plans and expectations educations in teach back method with documentation complete? Yes   Right Care Referral Info   Post Acute Recommendation Home-care   Referral Type  SN, PT, HHA   Facility Name [facility]    [address] [place]  [address]

## 2017-08-25 NOTE — NURSING
"At 1430 entered room to check on pt. Pt was side lying right in the bed. I asked the pt how she was doing when she stated "I fell". Pt has been ambulating independently this hospital stay. I asked her what happened to which she replied "I was emptying my water pitcher in the sink and I slipped and fell. I fell forward, grabbed the keyboard, and landed on my right knee". I checked her body for bruises and did not find any. Abd dressing was CDI. Pt denied any increased pain and stated "I'm fine". Shelby Choudhary NP was then notified. Shelby asked me to clarify wether the pt fell d/t slipping, or if she was dizzy. A stat x-ray of the right knee was ordered. When asking the pt to clarify, she stated that she did not know the reasoning. I explained to her that the provider would like to know the reasoning if possible in case she wanted to run any additional tests before the pt left. She then stated "Ok, I slipped on the water". While I was speaking to the pt, she began to shake and become diaphoretic. Cbg was checked which showed to be 78. A sandwich and juice was then given. Shelby then notified of pt's response and situation with her cbg. Instructed to reasses the pt after she ate. Upon reassessment, the pt was asymptomatic and her cbg was 244. The pt then told me that she did not have any insulin at home. She stated that she could order it tomorrow thru [facility] mailservice and then have it in two days. Shebly notified who stated the pt most likely has been out of insulin and running high, and her sugar dropped when she fell. A prescription was sent to the pharmacy for Insulin to hold the pt over until her hospital f/u appointment with her pcp. Shelby also stated to not give any insulin, and pt is adequate for dc.  "

## 2017-08-25 NOTE — NURSING
Message sent to Dr. Gonzalez's office for f/u appointment in one week and asked to call pt with date and time at number received by pt's daughter. Educated that if she does not hear from them in 2-3 days, she needs to call the office to schedule her appointment. Verbalizes understanding.

## 2017-08-25 NOTE — PROGRESS NOTES
"Discussed wound care with pt, pt was curious about next dressing change, Informed pt it was scheduled for 0600. Pt stated "I don't like mornings, can it be done in the afternoon?" DARWIN Sofia NP notified and dressing change times changed to afternoon.  "

## 2017-08-26 NOTE — PLAN OF CARE
Problem: Patient Care Overview  Goal: Plan of Care Review  Outcome: Outcome(s) achieved Date Met: 08/25/17  VSS. Pt adequate for dc.

## 2017-08-28 ENCOUNTER — TELEPHONE (OUTPATIENT)
Dept: ORTHOPEDICS | Facility: CLINIC | Age: 72
End: 2017-08-28

## 2017-08-28 ENCOUNTER — TELEPHONE (OUTPATIENT)
Dept: FAMILY MEDICINE | Facility: CLINIC | Age: 72
End: 2017-08-28

## 2017-08-28 LAB
BACTERIA BLD CULT: NORMAL
BACTERIA BLD CULT: NORMAL

## 2017-08-28 NOTE — TELEPHONE ENCOUNTER
----- Message from Myranda Robles sent at 8/28/2017  1:35 PM CDT -----  Contact: Carlee with [facility]   Carlee called and stated she needed to speak to the nurse. She stated she that the pt was admitted to [facility] on Saturday 8/26/17. She also stated that she went to visit pt today and the pt has moved to [place]. She can be reached at [phone].    Thanks,  TF

## 2017-08-28 NOTE — TELEPHONE ENCOUNTER
Spoke with Carlee with [facility] and she stated that the pateint was admitted to [facility], however, when she went to see the patient this morning, she found out that the patient now resides in [place].

## 2017-08-28 NOTE — TELEPHONE ENCOUNTER
----- Message from Dona Padilla RN sent at 8/25/2017  6:38 PM CDT -----  Pt needs a hospital f/u parul with Dr. Gonzalez or Teresa Batista in one week for R foot fx. Please call pt with parul date and time. Thank you!

## 2017-09-27 ENCOUNTER — TELEPHONE (OUTPATIENT)
Dept: ORTHOPEDICS | Facility: CLINIC | Age: 72
End: 2017-09-27

## 2017-10-05 ENCOUNTER — PATIENT OUTREACH (OUTPATIENT)
Dept: ADMINISTRATIVE | Facility: HOSPITAL | Age: 72
End: 2017-10-05

## 2017-10-05 DIAGNOSIS — Z11.59 NEED FOR HEPATITIS C SCREENING TEST: Primary | ICD-10-CM

## 2017-10-05 DIAGNOSIS — E11.69 CONTROLLED TYPE 2 DIABETES MELLITUS WITH OTHER SPECIFIED COMPLICATION, UNSPECIFIED LONG TERM INSULIN USE STATUS: ICD-10-CM

## 2017-10-05 DIAGNOSIS — M89.9 BONE DISORDER: ICD-10-CM

## 2017-10-05 DIAGNOSIS — Z12.31 ENCOUNTER FOR SCREENING MAMMOGRAM FOR MALIGNANT NEOPLASM OF BREAST: ICD-10-CM

## 2017-10-05 NOTE — PROGRESS NOTES
I spoke with pt that stated she could not take care of her self when she was sick. Pt went to [place] and stayed with her daughter until she felt better. Pt is back now and did allow me to book all needed appts. Pt stated she has not set up her appt for DM Eye Exam but will do that soon. Pt verbalized understanding of appts.

## 2017-10-19 ENCOUNTER — OFFICE VISIT (OUTPATIENT)
Dept: FAMILY MEDICINE | Facility: CLINIC | Age: 72
End: 2017-10-19
Payer: MEDICARE

## 2017-10-19 VITALS
HEART RATE: 100 BPM | DIASTOLIC BLOOD PRESSURE: 76 MMHG | HEIGHT: 66 IN | WEIGHT: 196.75 LBS | SYSTOLIC BLOOD PRESSURE: 156 MMHG | OXYGEN SATURATION: 98 % | TEMPERATURE: 98 F | BODY MASS INDEX: 31.62 KG/M2

## 2017-10-19 DIAGNOSIS — E11.69 DM TYPE 2 WITH DIABETIC DYSLIPIDEMIA: Primary | ICD-10-CM

## 2017-10-19 DIAGNOSIS — Z11.59 ENCOUNTER FOR HEPATITIS C SCREENING TEST FOR LOW RISK PATIENT: ICD-10-CM

## 2017-10-19 DIAGNOSIS — F32.5 MAJOR DEPRESSIVE DISORDER WITH SINGLE EPISODE, IN FULL REMISSION: ICD-10-CM

## 2017-10-19 DIAGNOSIS — I10 HYPERTENSION, UNSPECIFIED TYPE: ICD-10-CM

## 2017-10-19 DIAGNOSIS — E78.5 DM TYPE 2 WITH DIABETIC DYSLIPIDEMIA: Primary | ICD-10-CM

## 2017-10-19 PROCEDURE — 99214 OFFICE O/P EST MOD 30 MIN: CPT | Mod: 25,S$PBB,, | Performed by: FAMILY MEDICINE

## 2017-10-19 PROCEDURE — 90746 HEPB VACCINE 3 DOSE ADULT IM: CPT | Mod: PBBFAC,PO

## 2017-10-19 PROCEDURE — 99213 OFFICE O/P EST LOW 20 MIN: CPT | Mod: PBBFAC,PO | Performed by: FAMILY MEDICINE

## 2017-10-19 PROCEDURE — G0010 ADMIN HEPATITIS B VACCINE: HCPCS | Mod: PBBFAC,PO

## 2017-10-19 PROCEDURE — G0008 ADMIN INFLUENZA VIRUS VAC: HCPCS | Mod: PBBFAC,PO

## 2017-10-19 PROCEDURE — 99999 PR PBB SHADOW E&M-EST. PATIENT-LVL III: CPT | Mod: PBBFAC,,, | Performed by: FAMILY MEDICINE

## 2017-10-19 RX ORDER — GABAPENTIN 300 MG/1
300 CAPSULE ORAL NIGHTLY
Qty: 90 CAPSULE | Refills: 0 | Status: SHIPPED | OUTPATIENT
Start: 2017-10-19 | End: 2018-02-09 | Stop reason: SDUPTHER

## 2017-10-19 RX ORDER — ATENOLOL 100 MG/1
100 TABLET ORAL DAILY
Qty: 90 TABLET | Refills: 0 | Status: SHIPPED | OUTPATIENT
Start: 2017-10-19 | End: 2018-02-09 | Stop reason: SDUPTHER

## 2017-10-19 RX ORDER — ATORVASTATIN CALCIUM 80 MG/1
80 TABLET, FILM COATED ORAL DAILY
Qty: 90 TABLET | Refills: 0 | Status: SHIPPED | OUTPATIENT
Start: 2017-10-19 | End: 2018-02-09 | Stop reason: SDUPTHER

## 2017-10-19 RX ORDER — DULOXETIN HYDROCHLORIDE 60 MG/1
60 CAPSULE, DELAYED RELEASE ORAL DAILY
Qty: 90 CAPSULE | Refills: 0 | Status: SHIPPED | OUTPATIENT
Start: 2017-10-19 | End: 2018-02-09 | Stop reason: SDUPTHER

## 2017-10-19 RX ORDER — TELMISARTAN AND HYDROCHLORTHIAZIDE 40; 12.5 MG/1; MG/1
1 TABLET ORAL DAILY
Qty: 90 TABLET | Refills: 0 | Status: SHIPPED | OUTPATIENT
Start: 2017-10-19 | End: 2018-02-09 | Stop reason: SDUPTHER

## 2017-10-19 RX ORDER — METFORMIN HYDROCHLORIDE 1000 MG/1
1000 TABLET ORAL 2 TIMES DAILY WITH MEALS
Qty: 180 TABLET | Refills: 0 | Status: SHIPPED | OUTPATIENT
Start: 2017-10-19 | End: 2018-02-09 | Stop reason: SDUPTHER

## 2017-10-19 RX ORDER — LORAZEPAM 1 MG/1
1 TABLET ORAL NIGHTLY
Qty: 90 TABLET | Refills: 0 | Status: SHIPPED | OUTPATIENT
Start: 2017-10-19 | End: 2018-02-12 | Stop reason: SDUPTHER

## 2017-10-19 NOTE — PROGRESS NOTES
Subjective:       Patient ID: Tamara Barriga is a 72 y.o. female.    Chief Complaint: Medication Refill    72 y old  Female  With Type 2 dm with feet neuropathy , depression  , htn , dlp , obesity . NOt  On aspirin,  She has  Had 2 pneumonia vaccines , tdap less than 2 y , she also had zoster  ,  Will like hep B and influenza  , exercises: not much , Opthalmology : seen last December  In  , She cant recall   Name of OPT  , checking FBS once weekly : 200.  She has been out of  BP meds for 1 w .Depression is well controlled with Cymbalta . Takes ativan for insomnia        Review of Systems   Constitutional: Negative.    HENT: Negative.    Respiratory: Negative.    Cardiovascular: Negative.    Gastrointestinal: Negative.    Genitourinary: Negative.    Musculoskeletal: Negative.    Neurological: Negative.    Hematological: Negative.        Objective:      Physical Exam   Constitutional: She is oriented to person, place, and time. She appears well-developed and well-nourished. No distress.   HENT:   Head: Normocephalic and atraumatic.   Right Ear: External ear normal.   Left Ear: External ear normal.   Mouth/Throat: No oropharyngeal exudate.   Eyes: Conjunctivae and EOM are normal. Pupils are equal, round, and reactive to light. Right eye exhibits no discharge. Left eye exhibits no discharge. No scleral icterus.   Neck: Normal range of motion. Neck supple. No JVD present. No tracheal deviation present. No thyromegaly present.   Cardiovascular: Normal rate, regular rhythm and normal heart sounds.  Exam reveals no gallop and no friction rub.    No murmur heard.  Pulmonary/Chest: Effort normal and breath sounds normal. No stridor. No respiratory distress. She has no wheezes. She has no rales. She exhibits no tenderness.   Abdominal: Soft. Bowel sounds are normal. She exhibits no distension. There is no tenderness. There is no rebound and no guarding.   Musculoskeletal: Normal range of motion.   Lymphadenopathy:      She has no cervical adenopathy.   Neurological: She is alert and oriented to person, place, and time.   Skin: Skin is warm and dry. She is not diaphoretic.   Psychiatric: She has a normal mood and affect. Her behavior is normal. Judgment and thought content normal.       Assessment:     Tamara was seen today for medication refill.    Diagnoses and all orders for this visit:    DM type 2 with diabetic dyslipidemia  -     Microalbumin/creatinine urine ratio  -     Lipid panel; Future    Encounter for hepatitis C screening test for low risk patient  -     Hepatitis C antibody; Future    Hypertension, unspecified type    Major depressive disorder with single episode, in full remission    Other orders  -     (In Office Administered) Hepatitis B Vaccine (Adult) (IM)  -     atenolol (TENORMIN) 100 MG tablet; Take 1 tablet (100 mg total) by mouth once daily.  -     atorvastatin (LIPITOR) 80 MG tablet; Take 1 tablet (80 mg total) by mouth once daily.  -     duloxetine (CYMBALTA) 60 MG capsule; Take 1 capsule (60 mg total) by mouth once daily.  -     gabapentin (NEURONTIN) 300 MG capsule; Take 1 capsule (300 mg total) by mouth every evening.  -     lorazepam (ATIVAN) 1 MG tablet; Take 1 tablet (1 mg total) by mouth every evening.  -     metformin (GLUCOPHAGE) 1000 MG tablet; Take 1 tablet (1,000 mg total) by mouth 2 (two) times daily with meals.  -     telmisartan-hydrochlorothiazide (MICARDIS HCT) 40-12.5 mg per tablet; Take 1 tablet by mouth once daily.  -     Influenza - High Dose (65+) (PF) (IM)      Plan:   Monitor BS QID , bring log in 1 w . Diet and ex   Uncontrolled. Resume BP meds. Reassess in 1 w     Stable . Med rf

## 2017-10-23 ENCOUNTER — TELEPHONE (OUTPATIENT)
Dept: RADIOLOGY | Facility: HOSPITAL | Age: 72
End: 2017-10-23

## 2017-10-25 ENCOUNTER — OFFICE VISIT (OUTPATIENT)
Dept: FAMILY MEDICINE | Facility: CLINIC | Age: 72
End: 2017-10-25
Payer: MEDICARE

## 2017-10-25 ENCOUNTER — LAB VISIT (OUTPATIENT)
Dept: LAB | Facility: HOSPITAL | Age: 72
End: 2017-10-25
Attending: FAMILY MEDICINE
Payer: MEDICARE

## 2017-10-25 VITALS
BODY MASS INDEX: 31.92 KG/M2 | TEMPERATURE: 97 F | HEIGHT: 66 IN | OXYGEN SATURATION: 98 % | SYSTOLIC BLOOD PRESSURE: 158 MMHG | DIASTOLIC BLOOD PRESSURE: 96 MMHG | HEART RATE: 72 BPM | WEIGHT: 198.63 LBS

## 2017-10-25 DIAGNOSIS — E78.5 DM TYPE 2 WITH DIABETIC DYSLIPIDEMIA: ICD-10-CM

## 2017-10-25 DIAGNOSIS — I10 HYPERTENSION, UNSPECIFIED TYPE: ICD-10-CM

## 2017-10-25 DIAGNOSIS — E11.69 DM TYPE 2 WITH DIABETIC DYSLIPIDEMIA: ICD-10-CM

## 2017-10-25 DIAGNOSIS — R00.2 PALPITATIONS: Primary | ICD-10-CM

## 2017-10-25 DIAGNOSIS — R79.9 ABNORMAL FINDING OF BLOOD CHEMISTRY: ICD-10-CM

## 2017-10-25 DIAGNOSIS — Z11.59 ENCOUNTER FOR HEPATITIS C SCREENING TEST FOR LOW RISK PATIENT: ICD-10-CM

## 2017-10-25 DIAGNOSIS — F32.5 MAJOR DEPRESSIVE DISORDER WITH SINGLE EPISODE, IN FULL REMISSION: ICD-10-CM

## 2017-10-25 LAB
CHOLEST SERPL-MCNC: 218 MG/DL
CHOLEST/HDLC SERPL: 5.5 {RATIO}
HDLC SERPL-MCNC: 40 MG/DL
HDLC SERPL: 18.3 %
LDLC SERPL CALC-MCNC: 105.6 MG/DL
NONHDLC SERPL-MCNC: 178 MG/DL
TRIGL SERPL-MCNC: 362 MG/DL

## 2017-10-25 PROCEDURE — 99213 OFFICE O/P EST LOW 20 MIN: CPT | Mod: PBBFAC,PO | Performed by: FAMILY MEDICINE

## 2017-10-25 PROCEDURE — 86803 HEPATITIS C AB TEST: CPT

## 2017-10-25 PROCEDURE — 99214 OFFICE O/P EST MOD 30 MIN: CPT | Mod: S$PBB,,, | Performed by: FAMILY MEDICINE

## 2017-10-25 PROCEDURE — 80061 LIPID PANEL: CPT

## 2017-10-25 PROCEDURE — 36415 COLL VENOUS BLD VENIPUNCTURE: CPT | Mod: PO

## 2017-10-25 PROCEDURE — 93010 ELECTROCARDIOGRAM REPORT: CPT | Mod: ,,, | Performed by: INTERNAL MEDICINE

## 2017-10-25 PROCEDURE — 99999 PR PBB SHADOW E&M-EST. PATIENT-LVL III: CPT | Mod: PBBFAC,,, | Performed by: FAMILY MEDICINE

## 2017-10-25 PROCEDURE — 93005 ELECTROCARDIOGRAM TRACING: CPT | Mod: PBBFAC,PO | Performed by: FAMILY MEDICINE

## 2017-10-25 NOTE — PROGRESS NOTES
"Subjective:       Patient ID: Tamara Barriga is a 72 y.o. female.    Chief Complaint: Follow-up    72 y old female with type 2 DM , HTN  . MO here to review BSL . She has not been monitoring BS even thought she was instructed. She forgot . She is also having the shakes. She has been out of  Cymbalta and Neurontin for 1 m since she gets pay every 3 weeks, she has been getting  cold and hot , she feels that her heart is pounding   " like Im sacred , like Im going thru the change ".She denies  any worsening  depression , she is just anxious        Review of Systems   Constitutional: Negative.    HENT: Negative.    Respiratory: Negative.    Cardiovascular: Positive for palpitations.   Gastrointestinal: Negative.    Genitourinary: Negative.    Musculoskeletal: Negative.    Hematological: Negative.    Psychiatric/Behavioral: The patient is nervous/anxious.        Objective:      Physical Exam   Constitutional: She is oriented to person, place, and time. She appears well-developed and well-nourished. No distress.   HENT:   Head: Normocephalic and atraumatic.   Right Ear: External ear normal.   Left Ear: External ear normal.   Mouth/Throat: No oropharyngeal exudate.   Eyes: Conjunctivae and EOM are normal. Pupils are equal, round, and reactive to light. Right eye exhibits no discharge. Left eye exhibits no discharge. No scleral icterus.   Neck: Normal range of motion. Neck supple. No JVD present. No tracheal deviation present. No thyromegaly present.   Cardiovascular: Normal rate, regular rhythm and normal heart sounds.  Exam reveals no gallop and no friction rub.    No murmur heard.  Pulmonary/Chest: Effort normal and breath sounds normal. No stridor. No respiratory distress. She has no wheezes. She has no rales. She exhibits no tenderness.   Abdominal: Soft. Bowel sounds are normal. She exhibits no distension. There is no tenderness. There is no rebound and no guarding.   Musculoskeletal: Normal range of motion. "   Lymphadenopathy:     She has no cervical adenopathy.   Neurological: She is alert and oriented to person, place, and time.   Skin: Skin is warm and dry. She is not diaphoretic.   Psychiatric: She has a normal mood and affect. Her behavior is normal. Judgment and thought content normal.       Assessment:     Tamara was seen today for follow-up.    Diagnoses and all orders for this visit:    Palpitations  -     IN OFFICE EKG 12-LEAD (to Muse)  -     POCT glucose    Abnormal finding of blood chemistry   -     POCT glucose    Hypertension, unspecified type    Major depressive disorder with single episode, in full remission    DM type 2 with diabetic dyslipidemia      Plan:   Get  back on neurotnin and Cymbalta.  Reassess in 1 w   Uncontrolled. She has not taken Telmisartan today   See No 1   Keep BSL . return in 1 w

## 2017-10-26 LAB — HCV AB SERPL QL IA: NEGATIVE

## 2017-10-30 ENCOUNTER — TELEPHONE (OUTPATIENT)
Dept: FAMILY MEDICINE | Facility: CLINIC | Age: 72
End: 2017-10-30

## 2017-10-30 DIAGNOSIS — R94.31 ABNORMAL EKG: Primary | ICD-10-CM

## 2017-11-01 ENCOUNTER — OFFICE VISIT (OUTPATIENT)
Dept: FAMILY MEDICINE | Facility: CLINIC | Age: 72
End: 2017-11-01
Payer: MEDICARE

## 2017-11-01 ENCOUNTER — OFFICE VISIT (OUTPATIENT)
Dept: CARDIOLOGY | Facility: CLINIC | Age: 72
End: 2017-11-01
Payer: MEDICARE

## 2017-11-01 VITALS
TEMPERATURE: 98 F | BODY MASS INDEX: 31.92 KG/M2 | OXYGEN SATURATION: 95 % | HEIGHT: 66 IN | SYSTOLIC BLOOD PRESSURE: 124 MMHG | DIASTOLIC BLOOD PRESSURE: 86 MMHG | HEART RATE: 81 BPM | WEIGHT: 198.63 LBS

## 2017-11-01 VITALS
SYSTOLIC BLOOD PRESSURE: 140 MMHG | BODY MASS INDEX: 31.89 KG/M2 | HEART RATE: 85 BPM | DIASTOLIC BLOOD PRESSURE: 70 MMHG | WEIGHT: 198.44 LBS | HEIGHT: 66 IN | OXYGEN SATURATION: 96 %

## 2017-11-01 DIAGNOSIS — I10 HYPERTENSION, UNSPECIFIED TYPE: ICD-10-CM

## 2017-11-01 DIAGNOSIS — E11.42 DIABETIC POLYNEUROPATHY ASSOCIATED WITH TYPE 2 DIABETES MELLITUS: ICD-10-CM

## 2017-11-01 DIAGNOSIS — I10 ESSENTIAL HYPERTENSION: ICD-10-CM

## 2017-11-01 DIAGNOSIS — E78.5 DM TYPE 2 WITH DIABETIC DYSLIPIDEMIA: ICD-10-CM

## 2017-11-01 DIAGNOSIS — E11.69 DM TYPE 2 WITH DIABETIC DYSLIPIDEMIA: ICD-10-CM

## 2017-11-01 DIAGNOSIS — R07.89 CHEST PAIN, ATYPICAL: Primary | ICD-10-CM

## 2017-11-01 DIAGNOSIS — R00.2 PALPITATIONS: ICD-10-CM

## 2017-11-01 PROBLEM — A41.9 SEPSIS: Status: RESOLVED | Noted: 2017-08-23 | Resolved: 2017-11-01

## 2017-11-01 PROCEDURE — 99204 OFFICE O/P NEW MOD 45 MIN: CPT | Mod: S$PBB,,, | Performed by: INTERNAL MEDICINE

## 2017-11-01 PROCEDURE — 99214 OFFICE O/P EST MOD 30 MIN: CPT | Mod: S$PBB,,, | Performed by: FAMILY MEDICINE

## 2017-11-01 PROCEDURE — 99999 PR PBB SHADOW E&M-EST. PATIENT-LVL III: CPT | Mod: PBBFAC,,, | Performed by: FAMILY MEDICINE

## 2017-11-01 PROCEDURE — 99213 OFFICE O/P EST LOW 20 MIN: CPT | Mod: PBBFAC,27,PO | Performed by: INTERNAL MEDICINE

## 2017-11-01 PROCEDURE — 99999 PR PBB SHADOW E&M-EST. PATIENT-LVL III: CPT | Mod: PBBFAC,,, | Performed by: INTERNAL MEDICINE

## 2017-11-01 PROCEDURE — 99213 OFFICE O/P EST LOW 20 MIN: CPT | Mod: PBBFAC,PO | Performed by: FAMILY MEDICINE

## 2017-11-01 NOTE — PROGRESS NOTES
Subjective:   Patient ID:  Tamara Barriga is a 72 y.o. female who presents for evaluation of Abnormal ECG and Palpitations (frequently)      73 yo female, for palpitation.  PMH IDDM x20 yrs, HTN, HLD and anxiety.  Has had one month of palpitation, intermittent b/l chest heaviness, restless and dyspnea. Worse with emotional stress.  Improved after resumed her antidepressing medication.  Two months ago, had abd wall abscess. And had surgery done in .  No smoking/drinking.   EKG NSR and t wave inversion.          Past Medical History:   Diagnosis Date    Anxiety     Diabetes mellitus, type 2     Hyperlipidemia     Hypertension     Insomnia        Past Surgical History:   Procedure Laterality Date    BACK SURGERY      BREAST SURGERY      HYSTERECTOMY      LIVER BIOPSY         Social History   Substance Use Topics    Smoking status: Never Smoker    Smokeless tobacco: Never Used    Alcohol use 0.0 - 0.6 oz/week       Family History   Problem Relation Age of Onset    Cancer Daughter      Breast Ca        Review of Systems   Constitution: Negative for decreased appetite, diaphoresis, fever, weakness, malaise/fatigue and night sweats.   HENT: Negative for nosebleeds.    Eyes: Negative for blurred vision and double vision.   Cardiovascular: Positive for chest pain and palpitations. Negative for claudication, dyspnea on exertion, irregular heartbeat, leg swelling, near-syncope, orthopnea, paroxysmal nocturnal dyspnea and syncope.   Respiratory: Negative for cough, shortness of breath, sleep disturbances due to breathing, snoring, sputum production and wheezing.    Endocrine: Negative for cold intolerance and polyuria.   Hematologic/Lymphatic: Does not bruise/bleed easily.   Skin: Negative for rash.   Musculoskeletal: Negative for back pain, falls, joint pain, joint swelling and neck pain.   Gastrointestinal: Negative for abdominal pain, heartburn, nausea and vomiting.   Genitourinary: Negative for  dysuria, frequency and hematuria.   Neurological: Negative for difficulty with concentration, dizziness, focal weakness, headaches, light-headedness, numbness and seizures.   Psychiatric/Behavioral: Negative for depression, memory loss and substance abuse. The patient does not have insomnia.    Allergic/Immunologic: Negative for HIV exposure and hives.       Objective:   Physical Exam   Constitutional: She is oriented to person, place, and time. She appears well-nourished.   HENT:   Head: Normocephalic.   Eyes: Pupils are equal, round, and reactive to light.   Neck: Normal carotid pulses and no JVD present. Carotid bruit is not present. No thyromegaly present.   Cardiovascular: Normal rate, regular rhythm, normal heart sounds and normal pulses.   No extrasystoles are present. PMI is not displaced.  Exam reveals no gallop and no S3.    No murmur heard.  Pulmonary/Chest: Breath sounds normal. No stridor. No respiratory distress.   Abdominal: Soft. Bowel sounds are normal. There is no tenderness. There is no rebound.   Musculoskeletal: Normal range of motion.   Neurological: She is alert and oriented to person, place, and time.   Skin: Skin is intact. No rash noted.   Psychiatric: Her behavior is normal.       Lab Results   Component Value Date    CHOL 218 (H) 10/25/2017     Lab Results   Component Value Date    HDL 40 10/25/2017     Lab Results   Component Value Date    LDLCALC 105.6 10/25/2017     Lab Results   Component Value Date    TRIG 362 (H) 10/25/2017     Lab Results   Component Value Date    CHOLHDL 18.3 (L) 10/25/2017       Chemistry        Component Value Date/Time     08/25/2017 0518    K 4.2 08/25/2017 0518     08/25/2017 0518    CO2 27 08/25/2017 0518    BUN 8 08/25/2017 0518    CREATININE 0.8 08/25/2017 0518     08/25/2017 0518        Component Value Date/Time    CALCIUM 9.1 08/25/2017 0518    ALKPHOS 221 (H) 08/25/2017 0518     (H) 08/25/2017 0518    ALT 60 (H) 08/25/2017 0518     BILITOT 0.5 08/25/2017 0518    ESTGFRAFRICA >60 08/25/2017 0518    EGFRNONAA >60 08/25/2017 0518          No results found for: TSH  No results found for: INR, PROTIME  Lab Results   Component Value Date    WBC 6.05 08/25/2017    HGB 11.4 (L) 08/25/2017    HCT 34.3 (L) 08/25/2017    MCV 87 08/25/2017     08/25/2017     BNP  @LABRCNTIP(BNP,BNPTRIAGEBLO)@  CrCl cannot be calculated (Patient's most recent lab result is older than the maximum 7 days allowed.).     Assessment:      1. Chest pain, atypical    2. Essential hypertension    3. DM type 2 with diabetic dyslipidemia      Cp.  BP and LDL borderline  A1c 10.5    Plan:   Exercise stress echo  Continue micardia, Atenolol, asa 81mg and statin  Continue current meds.  Recommend heart-healthy diet, weight control and regular exercise.  Blanquita. Risk modification.   RTC in 6 months    I have reviewed all pertinent labs and cardiac studies. Plans and recommendations have been formulated under my direct supervision. All questions answered and patient voiced understanding. Patient to continue current medications.

## 2017-11-01 NOTE — PROGRESS NOTES
Subjective:       Patient ID: Tamara Barriga is a 72 y.o. female.    Chief Complaint: Follow-up and Diabetes    72 y old female with type 2 uncontrolled dm , uncontrolled htn and palpitations here for f.u after being compliant with Insulins, she still missed 2 days of novolog . FBS: 164, 178  , pre lunch : 365, pre dinner: 270, 285 . Walking , cutting back on carbs . Resume taking BP med. Hot flashes went away when she resume Cymbalta and Neurontin but still gets palpitations. She attributes them to anxiety . No CP , no sob .       Diabetes       Review of Systems   Constitutional: Negative.    HENT: Negative.    Respiratory: Negative.    Cardiovascular: Positive for palpitations.   Gastrointestinal: Negative.    Genitourinary: Negative.    Musculoskeletal: Negative.    Neurological: Negative.    Hematological: Negative.    Psychiatric/Behavioral: Negative.        Objective:      Physical Exam   Constitutional: She is oriented to person, place, and time. She appears well-developed and well-nourished. No distress.   HENT:   Head: Normocephalic and atraumatic.   Right Ear: External ear normal.   Left Ear: External ear normal.   Mouth/Throat: No oropharyngeal exudate.   Eyes: Conjunctivae and EOM are normal. Pupils are equal, round, and reactive to light. Right eye exhibits no discharge. Left eye exhibits no discharge. No scleral icterus.   Neck: Normal range of motion. Neck supple. No JVD present. No tracheal deviation present. No thyromegaly present.   Cardiovascular: Normal rate, regular rhythm and normal heart sounds.  Exam reveals no gallop and no friction rub.    No murmur heard.  Pulmonary/Chest: Effort normal and breath sounds normal. No stridor. No respiratory distress. She has no wheezes. She has no rales. She exhibits no tenderness.   Abdominal: Soft. Bowel sounds are normal. She exhibits no distension. There is no tenderness. There is no rebound and no guarding.   Musculoskeletal: Normal range of motion.    Lymphadenopathy:     She has no cervical adenopathy.   Neurological: She is alert and oriented to person, place, and time.   Skin: Skin is warm and dry. She is not diaphoretic.   Psychiatric: She has a normal mood and affect. Her behavior is normal. Judgment and thought content normal.       Assessment:     Tamara was seen today for follow-up and diabetes.    Diagnoses and all orders for this visit:    Uncontrolled type 2 diabetes mellitus with diabetic polyneuropathy, with long-term current use of insulin    Hypertension, unspecified type    Palpitations          Plan:   Add NOVOLOG 5 units with breakfast . Diet and ex. BSL in 1w   Controlled. Cont med   Abnormal EKG (non specific T wave inversion). See card  Time spent: 25  minutes in face to face discussion concerning diagnosis, prognosis, review of lab and test results, benefits of treatment as well as management of disease, counseling of patient and coordination of care between various health care providers . Greater than half the time spent was used for coordination of care and counseling of patient.

## 2017-11-14 ENCOUNTER — DOCUMENTATION ONLY (OUTPATIENT)
Dept: CARDIOLOGY | Facility: CLINIC | Age: 72
End: 2017-11-14

## 2017-11-14 ENCOUNTER — CLINICAL SUPPORT (OUTPATIENT)
Dept: CARDIOLOGY | Facility: CLINIC | Age: 72
End: 2017-11-14
Attending: INTERNAL MEDICINE
Payer: MEDICARE

## 2017-11-14 DIAGNOSIS — I10 ESSENTIAL HYPERTENSION: ICD-10-CM

## 2017-11-14 DIAGNOSIS — E78.5 DM TYPE 2 WITH DIABETIC DYSLIPIDEMIA: ICD-10-CM

## 2017-11-14 DIAGNOSIS — R07.9 CHEST PAIN, UNSPECIFIED TYPE: Primary | ICD-10-CM

## 2017-11-14 DIAGNOSIS — R07.89 CHEST PAIN, ATYPICAL: ICD-10-CM

## 2017-11-14 DIAGNOSIS — E11.69 DM TYPE 2 WITH DIABETIC DYSLIPIDEMIA: ICD-10-CM

## 2017-11-14 DIAGNOSIS — R26.2 UNABLE TO WALK: ICD-10-CM

## 2017-11-14 LAB
DIASTOLIC DYSFUNCTION: NO
ESTIMATED PA SYSTOLIC PRESSURE: 25.85
RETIRED EF AND QEF - SEE NOTES: 60 (ref 55–65)

## 2017-11-14 PROCEDURE — 93306 TTE W/DOPPLER COMPLETE: CPT | Mod: PBBFAC | Performed by: INTERNAL MEDICINE

## 2017-11-16 ENCOUNTER — PATIENT OUTREACH (OUTPATIENT)
Dept: ADMINISTRATIVE | Facility: HOSPITAL | Age: 72
End: 2017-11-16

## 2017-11-16 NOTE — PROGRESS NOTES
Pt stated she will not be able to have her DM Eye Exam before the end of the year because she will be in [place]. Pt verbalized understanding of appt.

## 2017-11-21 ENCOUNTER — HOSPITAL ENCOUNTER (OUTPATIENT)
Dept: RADIOLOGY | Facility: HOSPITAL | Age: 72
Discharge: HOME | End: 2017-11-21
Attending: FAMILY MEDICINE
Payer: MEDICARE

## 2017-11-21 VITALS — BODY MASS INDEX: 31.82 KG/M2 | HEIGHT: 66 IN | WEIGHT: 198 LBS

## 2017-11-21 DIAGNOSIS — Z12.31 ENCOUNTER FOR SCREENING MAMMOGRAM FOR MALIGNANT NEOPLASM OF BREAST: ICD-10-CM

## 2017-11-21 PROCEDURE — 77067 SCR MAMMO BI INCL CAD: CPT | Mod: 26,,, | Performed by: RADIOLOGY

## 2017-11-21 PROCEDURE — 77067 SCR MAMMO BI INCL CAD: CPT | Mod: TC

## 2017-12-01 ENCOUNTER — TELEPHONE (OUTPATIENT)
Dept: CARDIOLOGY | Facility: CLINIC | Age: 72
End: 2017-12-01

## 2017-12-04 ENCOUNTER — TELEPHONE (OUTPATIENT)
Dept: RADIOLOGY | Facility: HOSPITAL | Age: 72
End: 2017-12-04

## 2017-12-05 ENCOUNTER — TELEPHONE (OUTPATIENT)
Dept: RADIOLOGY | Facility: HOSPITAL | Age: 72
End: 2017-12-05

## 2017-12-07 ENCOUNTER — OFFICE VISIT (OUTPATIENT)
Dept: FAMILY MEDICINE | Facility: CLINIC | Age: 72
End: 2017-12-07
Payer: MEDICARE

## 2017-12-07 ENCOUNTER — LAB VISIT (OUTPATIENT)
Dept: LAB | Facility: HOSPITAL | Age: 72
End: 2017-12-07
Attending: FAMILY MEDICINE
Payer: MEDICARE

## 2017-12-07 VITALS
HEART RATE: 93 BPM | BODY MASS INDEX: 32.31 KG/M2 | HEIGHT: 66 IN | DIASTOLIC BLOOD PRESSURE: 86 MMHG | WEIGHT: 201.06 LBS | TEMPERATURE: 98 F | OXYGEN SATURATION: 96 % | SYSTOLIC BLOOD PRESSURE: 138 MMHG

## 2017-12-07 DIAGNOSIS — M85.80 OSTEOPENIA, UNSPECIFIED LOCATION: ICD-10-CM

## 2017-12-07 LAB — 25(OH)D3+25(OH)D2 SERPL-MCNC: 18 NG/ML

## 2017-12-07 PROCEDURE — 82306 VITAMIN D 25 HYDROXY: CPT

## 2017-12-07 PROCEDURE — 99999 PR PBB SHADOW E&M-EST. PATIENT-LVL IV: CPT | Mod: PBBFAC,,, | Performed by: FAMILY MEDICINE

## 2017-12-07 PROCEDURE — 99214 OFFICE O/P EST MOD 30 MIN: CPT | Mod: PBBFAC,PO | Performed by: FAMILY MEDICINE

## 2017-12-07 PROCEDURE — 36415 COLL VENOUS BLD VENIPUNCTURE: CPT | Mod: PO

## 2017-12-07 PROCEDURE — 99214 OFFICE O/P EST MOD 30 MIN: CPT | Mod: S$PBB,,, | Performed by: FAMILY MEDICINE

## 2017-12-07 RX ORDER — DICLOFENAC SODIUM 75 MG/1
75 TABLET, DELAYED RELEASE ORAL 2 TIMES DAILY PRN
Qty: 30 TABLET | Refills: 0 | Status: SHIPPED | OUTPATIENT
Start: 2017-12-07 | End: 2017-12-27 | Stop reason: SDUPTHER

## 2017-12-07 RX ORDER — ALENDRONATE SODIUM 70 MG/1
TABLET ORAL
Qty: 12 TABLET | Refills: 3 | Status: SHIPPED | OUTPATIENT
Start: 2017-12-07 | End: 2018-02-09 | Stop reason: SDUPTHER

## 2017-12-07 RX ORDER — INSULIN ASPART 100 [IU]/ML
INJECTION, SOLUTION INTRAVENOUS; SUBCUTANEOUS
Qty: 1 BOX | Refills: 0 | Status: SHIPPED | OUTPATIENT
Start: 2017-12-07 | End: 2018-01-30 | Stop reason: SDUPTHER

## 2017-12-07 NOTE — PROGRESS NOTES
Subjective:       Patient ID: Tamara Barriga is a 72 y.o. female.    Chief Complaint: Follow-up (test results)    72 y old female whores recent  DEXA showed cindy hilliard with a  3.7% risk of hip fracture in the next 10 years  , here to discuss recommendations. She has had rib fractures with coughing spells  ,  She also fractured  Her r fith  metatarsal .       Review of Systems   Constitutional: Negative.    HENT: Negative.    Respiratory: Negative.    Cardiovascular: Negative.    Gastrointestinal: Negative.    Genitourinary: Negative.    Musculoskeletal: Negative.    Hematological: Negative.        Objective:      Physical Exam   Constitutional: She is oriented to person, place, and time. She appears well-developed and well-nourished. No distress.   HENT:   Head: Normocephalic and atraumatic.   Right Ear: External ear normal.   Left Ear: External ear normal.   Mouth/Throat: No oropharyngeal exudate.   Eyes: Conjunctivae and EOM are normal. Pupils are equal, round, and reactive to light. Right eye exhibits no discharge. Left eye exhibits no discharge. No scleral icterus.   Neck: Normal range of motion. Neck supple. No JVD present. No tracheal deviation present. No thyromegaly present.   Cardiovascular: Normal rate, regular rhythm and normal heart sounds.  Exam reveals no gallop and no friction rub.    No murmur heard.  Pulmonary/Chest: Effort normal and breath sounds normal. No stridor. No respiratory distress. She has no wheezes. She has no rales. She exhibits no tenderness.   Abdominal: Soft. Bowel sounds are normal. She exhibits no distension. There is no tenderness. There is no rebound and no guarding.   Musculoskeletal: Normal range of motion.   Lymphadenopathy:     She has no cervical adenopathy.   Neurological: She is alert and oriented to person, place, and time.   Skin: Skin is warm and dry. She is not diaphoretic.   Psychiatric: She has a normal mood and affect. Her behavior is normal. Judgment and thought  content normal.       Assessment:       1. Osteopenia, unspecified location        Plan:     Tamara was seen today for follow-up.    Diagnoses and all orders for this visit:    Osteopenia, unspecified location  -     Vitamin D; Future    Other orders  -     Cancel: insulin detemir (LEVEMIR FLEXTOUCH) 100 unit/mL (3 mL) SubQ InPn pen; 75 units sq BID  -     insulin aspart (NOVOLOG FLEXPEN) 100 unit/mL InPn pen; 25 units sq  before lunch  -     insulin detemir (LEVEMIR FLEXTOUCH) 100 unit/mL (3 mL) SubQ InPn pen; 75 units sq BID  -     diclofenac (VOLTAREN) 75 MG EC tablet; Take 1 tablet (75 mg total) by mouth 2 (two) times daily as needed.  -     alendronate (FOSAMAX) 70 MG tablet; Take 1 tablet once weekly in the morning with a full glass of water on an empty stomach. Do not consume food or lie down for at least 30 minutes afterwards.     SE discussed. DEXA in 3 y

## 2017-12-08 ENCOUNTER — TELEPHONE (OUTPATIENT)
Dept: FAMILY MEDICINE | Facility: CLINIC | Age: 72
End: 2017-12-08

## 2017-12-08 DIAGNOSIS — E55.9 VITAMIN D DEFICIENCY: Primary | ICD-10-CM

## 2017-12-08 RX ORDER — ERGOCALCIFEROL 1.25 MG/1
50000 CAPSULE ORAL
Qty: 8 CAPSULE | Refills: 0 | Status: SHIPPED | OUTPATIENT
Start: 2017-12-08 | End: 2018-02-09 | Stop reason: SDUPTHER

## 2017-12-27 RX ORDER — DICLOFENAC SODIUM 75 MG/1
75 TABLET, DELAYED RELEASE ORAL 2 TIMES DAILY PRN
Qty: 30 TABLET | Refills: 0 | Status: SHIPPED | OUTPATIENT
Start: 2017-12-27 | End: 2018-06-30 | Stop reason: SDUPTHER

## 2018-01-19 ENCOUNTER — TELEPHONE (OUTPATIENT)
Dept: RADIOLOGY | Facility: HOSPITAL | Age: 73
End: 2018-01-19

## 2018-01-19 NOTE — TELEPHONE ENCOUNTER
Spoke with patient, she is out of town in [place], she states she will call me next week to reschedule mammo and u/s.

## 2018-01-30 RX ORDER — INSULIN ASPART 100 [IU]/ML
INJECTION, SOLUTION INTRAVENOUS; SUBCUTANEOUS
Qty: 1 BOX | Refills: 0 | Status: SHIPPED | OUTPATIENT
Start: 2018-01-30 | End: 2018-02-09 | Stop reason: SDUPTHER

## 2018-01-30 NOTE — TELEPHONE ENCOUNTER
Last office visit 12/07/2017. Last refill date 12/07/2017. Pt is requesting a refill on novolog flexpen 25units before lunch

## 2018-02-09 RX ORDER — DULOXETIN HYDROCHLORIDE 60 MG/1
60 CAPSULE, DELAYED RELEASE ORAL DAILY
Qty: 90 CAPSULE | Refills: 0 | Status: SHIPPED | OUTPATIENT
Start: 2018-02-09 | End: 2018-05-10 | Stop reason: SDUPTHER

## 2018-02-09 RX ORDER — ATENOLOL 100 MG/1
100 TABLET ORAL DAILY
Qty: 90 TABLET | Refills: 0 | Status: SHIPPED | OUTPATIENT
Start: 2018-02-09 | End: 2018-05-10 | Stop reason: SDUPTHER

## 2018-02-09 RX ORDER — GABAPENTIN 300 MG/1
300 CAPSULE ORAL NIGHTLY
Qty: 90 CAPSULE | Refills: 0 | Status: SHIPPED | OUTPATIENT
Start: 2018-02-09 | End: 2018-06-19 | Stop reason: SDUPTHER

## 2018-02-09 RX ORDER — ERGOCALCIFEROL 1.25 MG/1
50000 CAPSULE ORAL
Qty: 12 CAPSULE | Refills: 0 | Status: SHIPPED | OUTPATIENT
Start: 2018-02-09 | End: 2018-05-10 | Stop reason: SDUPTHER

## 2018-02-09 RX ORDER — ALENDRONATE SODIUM 70 MG/1
TABLET ORAL
Qty: 12 TABLET | Refills: 3 | Status: SHIPPED | OUTPATIENT
Start: 2018-02-09 | End: 2018-09-05 | Stop reason: SDUPTHER

## 2018-02-09 RX ORDER — ATORVASTATIN CALCIUM 80 MG/1
80 TABLET, FILM COATED ORAL DAILY
Qty: 90 TABLET | Refills: 0 | Status: SHIPPED | OUTPATIENT
Start: 2018-02-09 | End: 2018-05-10 | Stop reason: SDUPTHER

## 2018-02-09 RX ORDER — INSULIN ASPART 100 [IU]/ML
INJECTION, SOLUTION INTRAVENOUS; SUBCUTANEOUS
Qty: 1 BOX | Refills: 0 | Status: SHIPPED | OUTPATIENT
Start: 2018-02-09 | End: 2018-02-16 | Stop reason: SDUPTHER

## 2018-02-09 RX ORDER — TELMISARTAN AND HYDROCHLORTHIAZIDE 40; 12.5 MG/1; MG/1
1 TABLET ORAL DAILY
Qty: 90 TABLET | Refills: 0 | Status: SHIPPED | OUTPATIENT
Start: 2018-02-09 | End: 2018-05-10 | Stop reason: SDUPTHER

## 2018-02-09 RX ORDER — METFORMIN HYDROCHLORIDE 1000 MG/1
1000 TABLET ORAL 2 TIMES DAILY WITH MEALS
Qty: 180 TABLET | Refills: 0 | Status: SHIPPED | OUTPATIENT
Start: 2018-02-09 | End: 2018-05-23 | Stop reason: SDUPTHER

## 2018-02-09 NOTE — TELEPHONE ENCOUNTER
----- Message from Chelsae Mcclure sent at 2/9/2018 12:30 PM CST -----  Contact: [facility]  States the pt needs a 90day prescription on Levemir, the pt is out os insulin,  please fax to [phone], can be reached at [phone]///thxMW

## 2018-02-09 NOTE — TELEPHONE ENCOUNTER
She needs DM chronic care appointment. Come fasting for labs. Temp supply sent to pharm.  No refill will be given without appt

## 2018-02-12 ENCOUNTER — OFFICE VISIT (OUTPATIENT)
Dept: FAMILY MEDICINE | Facility: CLINIC | Age: 73
End: 2018-02-12
Payer: MEDICARE

## 2018-02-12 ENCOUNTER — LAB VISIT (OUTPATIENT)
Dept: LAB | Facility: HOSPITAL | Age: 73
End: 2018-02-12
Attending: FAMILY MEDICINE
Payer: MEDICARE

## 2018-02-12 VITALS
WEIGHT: 199.06 LBS | DIASTOLIC BLOOD PRESSURE: 100 MMHG | OXYGEN SATURATION: 97 % | TEMPERATURE: 98 F | RESPIRATION RATE: 18 BRPM | HEART RATE: 87 BPM | SYSTOLIC BLOOD PRESSURE: 183 MMHG | BODY MASS INDEX: 31.99 KG/M2 | HEIGHT: 66 IN

## 2018-02-12 DIAGNOSIS — E78.5 DM TYPE 2 WITH DIABETIC DYSLIPIDEMIA: ICD-10-CM

## 2018-02-12 DIAGNOSIS — E55.9 VITAMIN D INSUFFICIENCY: ICD-10-CM

## 2018-02-12 DIAGNOSIS — N39.498 OTHER URINARY INCONTINENCE: ICD-10-CM

## 2018-02-12 DIAGNOSIS — Z79.4 TYPE 2 DIABETES MELLITUS WITH HYPERGLYCEMIA, WITH LONG-TERM CURRENT USE OF INSULIN: ICD-10-CM

## 2018-02-12 DIAGNOSIS — I10 ESSENTIAL HYPERTENSION: ICD-10-CM

## 2018-02-12 DIAGNOSIS — E11.42 DIABETIC POLYNEUROPATHY ASSOCIATED WITH TYPE 2 DIABETES MELLITUS: ICD-10-CM

## 2018-02-12 DIAGNOSIS — E11.65 TYPE 2 DIABETES MELLITUS WITH HYPERGLYCEMIA, WITH LONG-TERM CURRENT USE OF INSULIN: ICD-10-CM

## 2018-02-12 DIAGNOSIS — Z12.11 COLON CANCER SCREENING: ICD-10-CM

## 2018-02-12 DIAGNOSIS — E11.69 DM TYPE 2 WITH DIABETIC DYSLIPIDEMIA: Primary | ICD-10-CM

## 2018-02-12 DIAGNOSIS — J02.9 VIRAL PHARYNGITIS: ICD-10-CM

## 2018-02-12 DIAGNOSIS — E11.69 DM TYPE 2 WITH DIABETIC DYSLIPIDEMIA: ICD-10-CM

## 2018-02-12 DIAGNOSIS — F32.5 MAJOR DEPRESSIVE DISORDER WITH SINGLE EPISODE, IN FULL REMISSION: ICD-10-CM

## 2018-02-12 DIAGNOSIS — F51.01 PRIMARY INSOMNIA: ICD-10-CM

## 2018-02-12 DIAGNOSIS — Z23 NEED FOR VACCINATION AGAINST STREPTOCOCCUS PNEUMONIAE: ICD-10-CM

## 2018-02-12 DIAGNOSIS — E78.5 DM TYPE 2 WITH DIABETIC DYSLIPIDEMIA: Primary | ICD-10-CM

## 2018-02-12 DIAGNOSIS — R92.8 ABNORMAL MAMMOGRAM: ICD-10-CM

## 2018-02-12 LAB
25(OH)D3+25(OH)D2 SERPL-MCNC: 14 NG/ML
ALBUMIN SERPL BCP-MCNC: 3.6 G/DL
ALP SERPL-CCNC: 93 U/L
ALT SERPL W/O P-5'-P-CCNC: 13 U/L
AMORPH CRY UR QL COMP ASSIST: ABNORMAL
ANION GAP SERPL CALC-SCNC: 11 MMOL/L
AST SERPL-CCNC: 13 U/L
BACTERIA #/AREA URNS AUTO: ABNORMAL /HPF
BASOPHILS # BLD AUTO: 0.04 K/UL
BASOPHILS NFR BLD: 0.4 %
BILIRUB SERPL-MCNC: 0.5 MG/DL
BILIRUB UR QL STRIP: NEGATIVE
BUN SERPL-MCNC: 22 MG/DL
CALCIUM SERPL-MCNC: 10 MG/DL
CHLORIDE SERPL-SCNC: 104 MMOL/L
CHOLEST SERPL-MCNC: 188 MG/DL
CHOLEST/HDLC SERPL: 5.1 {RATIO}
CLARITY UR REFRACT.AUTO: ABNORMAL
CO2 SERPL-SCNC: 25 MMOL/L
COLOR UR AUTO: YELLOW
CREAT SERPL-MCNC: 0.9 MG/DL
CREAT UR-MCNC: 180 MG/DL
CTP QC/QA: YES
DIFFERENTIAL METHOD: NORMAL
EOSINOPHIL # BLD AUTO: 0.1 K/UL
EOSINOPHIL NFR BLD: 1 %
ERYTHROCYTE [DISTWIDTH] IN BLOOD BY AUTOMATED COUNT: 13.3 %
EST. GFR  (AFRICAN AMERICAN): >60 ML/MIN/1.73 M^2
EST. GFR  (NON AFRICAN AMERICAN): >60 ML/MIN/1.73 M^2
ESTIMATED AVG GLUCOSE: 223 MG/DL
GLUCOSE SERPL-MCNC: 245 MG/DL
GLUCOSE SERPL-MCNC: 246 MG/DL (ref 70–110)
GLUCOSE UR QL STRIP: NEGATIVE
HBA1C MFR BLD HPLC: 9.4 %
HCT VFR BLD AUTO: 41.6 %
HDLC SERPL-MCNC: 37 MG/DL
HDLC SERPL: 19.7 %
HGB BLD-MCNC: 13.6 G/DL
HGB UR QL STRIP: NEGATIVE
HYALINE CASTS UR QL AUTO: 0 /LPF
IMM GRANULOCYTES # BLD AUTO: 0.03 K/UL
IMM GRANULOCYTES NFR BLD AUTO: 0.3 %
KETONES UR QL STRIP: NEGATIVE
LDLC SERPL CALC-MCNC: 102 MG/DL
LEUKOCYTE ESTERASE UR QL STRIP: ABNORMAL
LYMPHOCYTES # BLD AUTO: 3.2 K/UL
LYMPHOCYTES NFR BLD: 31.7 %
MCH RBC QN AUTO: 28.4 PG
MCHC RBC AUTO-ENTMCNC: 32.7 G/DL
MCV RBC AUTO: 87 FL
MICROALBUMIN UR DL<=1MG/L-MCNC: 107 UG/ML
MICROALBUMIN/CREATININE RATIO: 59.4 UG/MG
MICROSCOPIC COMMENT: ABNORMAL
MONOCYTES # BLD AUTO: 0.6 K/UL
MONOCYTES NFR BLD: 6.4 %
NEUTROPHILS # BLD AUTO: 6 K/UL
NEUTROPHILS NFR BLD: 60.2 %
NITRITE UR QL STRIP: NEGATIVE
NONHDLC SERPL-MCNC: 151 MG/DL
NRBC BLD-RTO: 0 /100 WBC
PH UR STRIP: 5 [PH] (ref 5–8)
PLATELET # BLD AUTO: 268 K/UL
PMV BLD AUTO: 11.7 FL
POTASSIUM SERPL-SCNC: 4.2 MMOL/L
PROT SERPL-MCNC: 7.4 G/DL
PROT UR QL STRIP: ABNORMAL
RBC # BLD AUTO: 4.79 M/UL
RBC #/AREA URNS AUTO: 0 /HPF (ref 0–4)
S PYO RRNA THROAT QL PROBE: NEGATIVE
SODIUM SERPL-SCNC: 140 MMOL/L
SP GR UR STRIP: 1.02 (ref 1–1.03)
TRIGL SERPL-MCNC: 245 MG/DL
URN SPEC COLLECT METH UR: ABNORMAL
UROBILINOGEN UR STRIP-ACNC: NEGATIVE EU/DL
WBC # BLD AUTO: 10.01 K/UL
WBC #/AREA URNS AUTO: 12 /HPF (ref 0–5)

## 2018-02-12 PROCEDURE — 81001 URINALYSIS AUTO W/SCOPE: CPT

## 2018-02-12 PROCEDURE — 85025 COMPLETE CBC W/AUTO DIFF WBC: CPT

## 2018-02-12 PROCEDURE — 82306 VITAMIN D 25 HYDROXY: CPT

## 2018-02-12 PROCEDURE — 80053 COMPREHEN METABOLIC PANEL: CPT

## 2018-02-12 PROCEDURE — G0009 ADMIN PNEUMOCOCCAL VACCINE: HCPCS | Mod: PBBFAC,PO

## 2018-02-12 PROCEDURE — 82948 REAGENT STRIP/BLOOD GLUCOSE: CPT | Mod: PBBFAC,PO | Performed by: NURSE PRACTITIONER

## 2018-02-12 PROCEDURE — 80061 LIPID PANEL: CPT

## 2018-02-12 PROCEDURE — 87081 CULTURE SCREEN ONLY: CPT

## 2018-02-12 PROCEDURE — 99215 OFFICE O/P EST HI 40 MIN: CPT | Mod: PBBFAC,PO,25 | Performed by: NURSE PRACTITIONER

## 2018-02-12 PROCEDURE — 83036 HEMOGLOBIN GLYCOSYLATED A1C: CPT

## 2018-02-12 PROCEDURE — 36415 COLL VENOUS BLD VENIPUNCTURE: CPT | Mod: PO

## 2018-02-12 PROCEDURE — 87880 STREP A ASSAY W/OPTIC: CPT | Mod: PBBFAC,PO | Performed by: NURSE PRACTITIONER

## 2018-02-12 PROCEDURE — 99999 PR PBB SHADOW E&M-EST. PATIENT-LVL V: CPT | Mod: PBBFAC,,, | Performed by: NURSE PRACTITIONER

## 2018-02-12 PROCEDURE — 1159F MED LIST DOCD IN RCRD: CPT | Mod: ,,, | Performed by: NURSE PRACTITIONER

## 2018-02-12 PROCEDURE — 82043 UR ALBUMIN QUANTITATIVE: CPT

## 2018-02-12 PROCEDURE — 99214 OFFICE O/P EST MOD 30 MIN: CPT | Mod: S$PBB,,, | Performed by: NURSE PRACTITIONER

## 2018-02-12 RX ORDER — LORAZEPAM 1 MG/1
1 TABLET ORAL NIGHTLY
Qty: 90 TABLET | Refills: 0 | Status: SHIPPED | OUTPATIENT
Start: 2018-02-12 | End: 2018-05-23 | Stop reason: SDUPTHER

## 2018-02-12 NOTE — PROGRESS NOTES
Subjective:       Patient ID: Tamara Barriga is a 73 y.o. female.    Chief Complaint: Medication Refill; Sore Throat; and Cough  Pt reports to clinic for chronic care visit. PMH of HTN, DM, vitamin D insuffiency, insomnia, depression.  Last A1C=8/22/17=10.5; 12/07/17 vitamin D=18; total cholesterol=318; triglycerides 362.  Pt reports she tries to monitor diet, no exercise regimen.  Has not taken any medication in weeks.  States she has been in [place].  Mammogram on 10/05/17 shows mass in left breast. Patient never obtained ultrasound.  Does not monitor glucose.   Diabetes   She presents for her follow-up diabetic visit. She has type 2 diabetes mellitus. Her disease course has been worsening. There are no hypoglycemic associated symptoms. There are no diabetic associated symptoms. There are no hypoglycemic complications. Risk factors for coronary artery disease include diabetes mellitus, dyslipidemia, hypertension, obesity, sedentary lifestyle and post-menopausal. Current diabetic treatment includes insulin injections. She is compliant with treatment some of the time.     Review of Systems   Constitutional: Negative.    HENT: Positive for sore throat.    Respiratory: Negative.    Cardiovascular: Negative.    Gastrointestinal: Negative.    Genitourinary: Negative.    Musculoskeletal: Negative.    Skin: Negative.    Psychiatric/Behavioral: Positive for sleep disturbance.       Objective:      Physical Exam   Constitutional: She is oriented to person, place, and time. She appears well-developed and well-nourished.   HENT:   Head: Normocephalic.   Mouth/Throat: Posterior oropharyngeal erythema present.   Eyes: EOM are normal.   Neck: Neck supple.   Cardiovascular: Normal rate and normal heart sounds.    Pulmonary/Chest: Effort normal.   Abdominal: Soft. Bowel sounds are normal.   Musculoskeletal: Normal range of motion.   Neurological: She is alert and oriented to person, place, and time.   Skin: Skin is warm and  dry.   Psychiatric: She has a normal mood and affect.   Vitals reviewed.      Assessment:       1. DM type 2 with diabetic dyslipidemia    2. Essential hypertension    3. Primary insomnia    4. Type 2 diabetes mellitus with hyperglycemia, with long-term current use of insulin    5. Diabetic polyneuropathy associated with type 2 diabetes mellitus    6. Abnormal mammogram    7. Vitamin D insufficiency    8. Major depressive disorder with single episode, in full remission    9. Colon cancer screening    10. Other urinary incontinence    11. Viral pharyngitis    12. Need for vaccination against Streptococcus pneumoniae        Plan:   DM type 2 with diabetic dyslipidemia  -     CBC auto differential; Future; Expected date: 02/12/2018  -     Comprehensive metabolic panel; Future; Expected date: 02/12/2018  -     Hemoglobin A1c; Future; Expected date: 02/12/2018  -     Lipid panel; Future; Expected date: 02/12/2018  -     MICROALBUMIN / CREATININE RATIO URINE  -uncontrolled. Glucose log given. Patient will follow up in 2 weeks and bring glucose log to visit.  -discussed importance of diet and exercise. Verbalized understanding  Essential hypertension  -uncontrolled. Patient did not take medication prior to arrival.  Will follow up as nurse visit in 4 days to re evaluate bp  Primary insomnia  -     LORazepam (ATIVAN) 1 MG tablet; Take 1 tablet (1 mg total) by mouth every evening.  Dispense: 90 tablet; Refill: 0  Stable. Safety precautions with sleep aid use discussed  Type 2 diabetes mellitus with hyperglycemia, with long-term current use of insulin  -     Ambulatory Referral to Ophthalmology  -     POCT Glucose  Uncontrolled. Per #1  Diabetic polyneuropathy associated with type 2 diabetes mellitus  uncontrolled  Abnormal mammogram  -     US Breast Left Complete; Future; Expected date: 02/12/2018    Vitamin D insufficiency  -     Vitamin D; Future; Expected date: 02/12/2018  Stable. Continue treatment plan  Major depressive  disorder with single episode, in full remission  stable  Colon cancer screening  -     Fecal Immunochemical Test (iFOBT); Future; Expected date: 02/12/2018    Other urinary incontinence  -     URINALYSIS    Viral pharyngitis  -     POCT Rapid Strep A  -     Strep A culture, throat  -     (Magic mouthwash) 1:1:1 Benadryl 12.5mg/5ml liq, aluminum & magnesium hydroxide-simehticone (Maalox), lidocaine viscous 2%; Swish and spit 10 mLs every 4 (four) hours as needed. for mouth sores  Dispense: 90 mL; Refill: 0    Need for vaccination against Streptococcus pneumoniae  -     (In Office Administered) Pneumococcal Conjugate Vaccine (13 Valent) (IM)      Follow-up in about 2 weeks (around 2/26/2018) for bring glucose log to follow up visit. .

## 2018-02-14 LAB — BACTERIA THROAT CULT: NORMAL

## 2018-02-15 ENCOUNTER — PATIENT OUTREACH (OUTPATIENT)
Dept: ADMINISTRATIVE | Facility: HOSPITAL | Age: 73
End: 2018-02-15

## 2018-02-15 NOTE — LETTER
February 15, 2018    Tamara Barriga  [address]  [place] [address]             [facility] [address] [place] [address]  Phone: [phone] Dear Mrs. Barriga:    I see you have an upcoming appointment with CHAR BARRIOS MD .      Your Primary Care Provider would like for you to have lab work prior to your visit and I will be happy to assist you in scheduling the following labs prior to your MD appointment:    Your chart is indicating you may be overdue for the following:   Health Maintenance Due   Topic    Fecal Occult Blood Test (FOBT)/FitKit     DM Eye Exam          Were any of these test/procedures performed outside of [facility]?  If so, please let me know when and where so I may request those records and update your chart.      If you would like me to coordinate scheduling any of the recommended test or procedures around the time of your appointment, please let me know.     Thank you for choosing [facility] for your healthcare needs,    Libby PETTY LPN  Care Coordination Department  [facility]  Phone Number: [phone]

## 2018-02-15 NOTE — PROGRESS NOTES
Pre visit chart audit letter sent. Attempting to contact pt to schedule over due HM. Unable to reach patient. Unable to LVM. Pt needs DM Eye Exam and I was attempting to remind pt to bring back FOBT kit when she comes in for next office visit.

## 2018-02-16 RX ORDER — INSULIN ASPART 100 [IU]/ML
INJECTION, SOLUTION INTRAVENOUS; SUBCUTANEOUS
Qty: 9 BOX | Refills: 3 | Status: SHIPPED | OUTPATIENT
Start: 2018-02-16 | End: 2018-09-05 | Stop reason: SDUPTHER

## 2018-02-17 ENCOUNTER — OFFICE VISIT (OUTPATIENT)
Dept: URGENT CARE | Facility: CLINIC | Age: 73
End: 2018-02-17
Payer: MEDICARE

## 2018-02-17 VITALS
HEART RATE: 70 BPM | HEIGHT: 66 IN | TEMPERATURE: 98 F | BODY MASS INDEX: 31.75 KG/M2 | DIASTOLIC BLOOD PRESSURE: 82 MMHG | WEIGHT: 197.56 LBS | OXYGEN SATURATION: 97 % | SYSTOLIC BLOOD PRESSURE: 130 MMHG

## 2018-02-17 DIAGNOSIS — H66.92 LEFT OTITIS MEDIA, UNSPECIFIED OTITIS MEDIA TYPE: Primary | ICD-10-CM

## 2018-02-17 DIAGNOSIS — J06.9 UPPER RESPIRATORY TRACT INFECTION, UNSPECIFIED TYPE: ICD-10-CM

## 2018-02-17 PROCEDURE — 99999 PR PBB SHADOW E&M-EST. PATIENT-LVL III: CPT | Mod: PBBFAC,,, | Performed by: NURSE PRACTITIONER

## 2018-02-17 PROCEDURE — 99214 OFFICE O/P EST MOD 30 MIN: CPT | Mod: S$PBB,,, | Performed by: NURSE PRACTITIONER

## 2018-02-17 PROCEDURE — 1125F AMNT PAIN NOTED PAIN PRSNT: CPT | Mod: ,,, | Performed by: NURSE PRACTITIONER

## 2018-02-17 PROCEDURE — 99213 OFFICE O/P EST LOW 20 MIN: CPT | Mod: PBBFAC,PO | Performed by: NURSE PRACTITIONER

## 2018-02-17 PROCEDURE — 1159F MED LIST DOCD IN RCRD: CPT | Mod: ,,, | Performed by: NURSE PRACTITIONER

## 2018-02-17 RX ORDER — AMOXICILLIN 500 MG/1
500 TABLET, FILM COATED ORAL EVERY 12 HOURS
Qty: 20 TABLET | Refills: 0 | Status: SHIPPED | OUTPATIENT
Start: 2018-02-17 | End: 2018-02-27

## 2018-02-17 RX ORDER — MONTELUKAST SODIUM 10 MG/1
10 TABLET ORAL NIGHTLY
Qty: 30 TABLET | Refills: 0 | Status: SHIPPED | OUTPATIENT
Start: 2018-02-17 | End: 2018-03-21 | Stop reason: SDUPTHER

## 2018-02-17 NOTE — PROGRESS NOTES
Subjective:       Patient ID: Tamara Barrgia is a 73 y.o. female.    Chief Complaint: Sore Throat (x1 week) and Headache    Sore Throat    This is a new problem. The current episode started 1 to 4 weeks ago (2 weeks). The pain is worse on the left side. There has been no fever. Associated symptoms include ear pain and headaches. Pertinent negatives include no congestion, coughing, ear discharge or shortness of breath. She has had no exposure to strep or mono.     Review of Systems   Constitutional: Negative for chills, diaphoresis, fatigue and fever.   HENT: Positive for ear pain and sore throat. Negative for congestion, ear discharge, postnasal drip, rhinorrhea, sinus pain, sinus pressure and sneezing.    Respiratory: Negative for cough, shortness of breath and wheezing.    Cardiovascular: Negative for chest pain and palpitations.   Musculoskeletal: Negative for back pain and myalgias.   Neurological: Positive for headaches.       Objective:      Physical Exam   Constitutional: She is oriented to person, place, and time. She appears well-developed and well-nourished. No distress.   HENT:   Head: Normocephalic.   Right Ear: External ear and ear canal normal. A middle ear effusion is present.   Left Ear: External ear and ear canal normal. Tympanic membrane is erythematous. Tympanic membrane is not perforated.   Nose: Nose normal. No mucosal edema or rhinorrhea. Right sinus exhibits no maxillary sinus tenderness and no frontal sinus tenderness. Left sinus exhibits no maxillary sinus tenderness and no frontal sinus tenderness.   Mouth/Throat: Uvula is midline, oropharynx is clear and moist and mucous membranes are normal. No oropharyngeal exudate, posterior oropharyngeal edema or posterior oropharyngeal erythema.   Eyes: Conjunctivae and EOM are normal.   Neck: Normal range of motion. Neck supple.   Cardiovascular: Normal rate, regular rhythm and normal heart sounds.    Pulmonary/Chest: Effort normal and breath  sounds normal. No accessory muscle usage. No apnea, no tachypnea and no bradypnea. No respiratory distress. She has no decreased breath sounds. She has no wheezes. She has no rhonchi. She has no rales.   Lymphadenopathy:        Head (right side): No submental, no submandibular and no tonsillar adenopathy present.        Head (left side): No submental, no submandibular and no tonsillar adenopathy present.     She has no cervical adenopathy.   Neurological: She is alert and oriented to person, place, and time.   Skin: Skin is warm and dry. She is not diaphoretic.       Assessment:       1. Left otitis media, unspecified otitis media type    2. Upper respiratory tract infection, unspecified type        Plan:   Tamara was seen today for sore throat and headache.    Diagnoses and all orders for this visit:    Left otitis media, unspecified otitis media type  -     amoxicillin (AMOXIL) 500 MG Tab; Take 1 tablet (500 mg total) by mouth every 12 (twelve) hours.    Upper respiratory tract infection, unspecified type  -     montelukast (SINGULAIR) 10 mg tablet; Take 1 tablet (10 mg total) by mouth every evening.        -     Diagnosis and treatment discussed, AVS provided  -     Follow up with PCP or ER immediately for worsening, new or no improvement of symptoms.   -     Patient understands and agrees with plan

## 2018-02-17 NOTE — PATIENT INSTRUCTIONS
Middle Ear Infection (Adult)  You have an infection of the middle ear, the space behind the eardrum. This is also called acute otitis media (AOM). Sometimes it is caused by the common cold. This is because congestion can block the internal passage (eustachian tube) that drains fluid from the middle ear. When the middle ear fills with fluid, bacteria can grow there and cause an infection. Oral antibiotics are used to treat this illness, not ear drops. Symptoms usually start to improve within 1 to 2 days of treatment.    Home care  The following are general care guidelines:  · Finish all of the antibiotic medicine given, even though you may feel better after the first few days.  · You may use over-the-counter medicine, such as acetaminophen or ibuprofen, to control pain and fever, unless something else was prescribed. If you have chronic liver or kidney disease or have ever had a stomach ulcer or gastrointestinal bleeding, talk with your healthcare provider before using these medicines. Do not give aspirin to anyone under 18 years of age who has a fever. It may cause severe illness or death.  Follow-up care  Follow up with your healthcare provider, or as advised, in 2 weeks if all symptoms have not gotten better, or if hearing doesn't go back to normal within 1 month.  When to seek medical advice  Call your healthcare provider right away if any of these occur:  · Ear pain gets worse or does not improve after 3 days of treatment  · Unusual drowsiness or confusion  · Neck pain, stiff neck, or headache  · Fluid or blood draining from the ear canal  · Fever of 100.4°F (38°C) or as advised   · Seizure  Date Last Reviewed: 6/1/2016  © 4767-9553 [employer]. [address] [place] [address], [place] [address]. All rights reserved. This information is not intended as a substitute for professional medical care. Always follow your healthcare professional's instructions.

## 2018-02-19 ENCOUNTER — HOSPITAL ENCOUNTER (OUTPATIENT)
Dept: RADIOLOGY | Facility: HOSPITAL | Age: 73
Discharge: HOME | End: 2018-02-19
Attending: FAMILY MEDICINE
Payer: MEDICARE

## 2018-02-19 DIAGNOSIS — R92.8 ABNORMAL MAMMOGRAM: ICD-10-CM

## 2018-02-19 PROCEDURE — 76642 ULTRASOUND BREAST LIMITED: CPT | Mod: 26,LT,, | Performed by: RADIOLOGY

## 2018-02-19 PROCEDURE — 77065 DX MAMMO INCL CAD UNI: CPT | Mod: 26,LT,, | Performed by: RADIOLOGY

## 2018-02-19 PROCEDURE — 77061 BREAST TOMOSYNTHESIS UNI: CPT | Mod: 26,LT,, | Performed by: RADIOLOGY

## 2018-02-19 PROCEDURE — 77061 BREAST TOMOSYNTHESIS UNI: CPT | Mod: TC,PO,LT

## 2018-02-19 PROCEDURE — 77065 DX MAMMO INCL CAD UNI: CPT | Mod: TC,PO,LT

## 2018-02-19 PROCEDURE — 76642 ULTRASOUND BREAST LIMITED: CPT | Mod: TC,PO,LT

## 2018-03-21 DIAGNOSIS — J06.9 UPPER RESPIRATORY TRACT INFECTION, UNSPECIFIED TYPE: ICD-10-CM

## 2018-03-21 RX ORDER — MONTELUKAST SODIUM 10 MG/1
10 TABLET ORAL NIGHTLY
Qty: 30 TABLET | Refills: 0 | Status: SHIPPED | OUTPATIENT
Start: 2018-03-21 | End: 2018-05-01 | Stop reason: SDUPTHER

## 2018-03-21 NOTE — TELEPHONE ENCOUNTER
----- Message from Hanh Groves sent at 3/21/2018  9:32 AM CDT -----  Contact: [facility] [phone]  States that pt needs a refill on singular. Please call back at [phone]//thank you acc

## 2018-05-01 DIAGNOSIS — J06.9 UPPER RESPIRATORY TRACT INFECTION, UNSPECIFIED TYPE: ICD-10-CM

## 2018-05-01 RX ORDER — LIDOCAINE HYDROCHLORIDE 20 MG/ML
SOLUTION ORAL; TOPICAL
COMMUNITY
Start: 2018-02-12 | End: 2018-11-06

## 2018-05-01 RX ORDER — MONTELUKAST SODIUM 10 MG/1
10 TABLET ORAL NIGHTLY
Qty: 30 TABLET | Refills: 0 | Status: SHIPPED | OUTPATIENT
Start: 2018-05-01 | End: 2018-05-14

## 2018-05-01 NOTE — TELEPHONE ENCOUNTER
Spoke with pt and informed needs an appt before next refill patient verbalized. Tried to schedule appt. Patient refused and stated that she also needs a refill of her insulin needles sent to the pharmacy.

## 2018-05-09 NOTE — TELEPHONE ENCOUNTER
----- Message from Juan M Joyner sent at 5/9/2018  3:13 PM CDT -----  Goldy ( [facility] ) is requesting a call from nurse to get prescription nova pin needles.        Please call Goldy ( [facility] ) back at [phone]

## 2018-05-10 RX ORDER — GABAPENTIN 300 MG/1
300 CAPSULE ORAL NIGHTLY
Qty: 30 CAPSULE | Refills: 0 | OUTPATIENT
Start: 2018-05-10

## 2018-05-10 RX ORDER — DULOXETIN HYDROCHLORIDE 60 MG/1
60 CAPSULE, DELAYED RELEASE ORAL DAILY
Qty: 90 CAPSULE | Refills: 0 | Status: SHIPPED | OUTPATIENT
Start: 2018-05-10 | End: 2018-09-01 | Stop reason: SDUPTHER

## 2018-05-10 RX ORDER — TELMISARTAN AND HYDROCHLORTHIAZIDE 40; 12.5 MG/1; MG/1
1 TABLET ORAL DAILY
Qty: 30 TABLET | Refills: 0 | Status: SHIPPED | OUTPATIENT
Start: 2018-05-10 | End: 2018-09-05 | Stop reason: SDUPTHER

## 2018-05-10 RX ORDER — ATORVASTATIN CALCIUM 80 MG/1
TABLET, FILM COATED ORAL
Qty: 30 TABLET | Refills: 0 | Status: SHIPPED | OUTPATIENT
Start: 2018-05-10 | End: 2018-05-23 | Stop reason: SDUPTHER

## 2018-05-10 RX ORDER — ATENOLOL 100 MG/1
TABLET ORAL
Qty: 30 TABLET | Refills: 0 | Status: SHIPPED | OUTPATIENT
Start: 2018-05-10 | End: 2018-06-22 | Stop reason: SDUPTHER

## 2018-05-10 RX ORDER — ERGOCALCIFEROL 1.25 MG/1
50000 CAPSULE ORAL
Qty: 12 CAPSULE | Refills: 0 | Status: SHIPPED | OUTPATIENT
Start: 2018-05-10 | End: 2018-11-12 | Stop reason: SDUPTHER

## 2018-05-10 RX ORDER — METFORMIN HYDROCHLORIDE 1000 MG/1
1000 TABLET ORAL 2 TIMES DAILY WITH MEALS
Qty: 180 TABLET | Refills: 0 | OUTPATIENT
Start: 2018-05-10

## 2018-05-10 NOTE — TELEPHONE ENCOUNTER
Please inform the patient that only a 1 month supply of medications have been sent to the pharmacy because she has cancelled all of her follow up appt to discuss managing her uncontrolled diabetes.  No refills will be given if she does not reschedule and keep appt

## 2018-05-14 ENCOUNTER — OFFICE VISIT (OUTPATIENT)
Dept: FAMILY MEDICINE | Facility: CLINIC | Age: 73
End: 2018-05-14
Payer: MEDICARE

## 2018-05-14 ENCOUNTER — DOCUMENTATION ONLY (OUTPATIENT)
Dept: FAMILY MEDICINE | Facility: CLINIC | Age: 73
End: 2018-05-14

## 2018-05-14 VITALS
WEIGHT: 200.38 LBS | TEMPERATURE: 98 F | HEIGHT: 66 IN | SYSTOLIC BLOOD PRESSURE: 108 MMHG | DIASTOLIC BLOOD PRESSURE: 60 MMHG | BODY MASS INDEX: 32.2 KG/M2 | HEART RATE: 84 BPM | OXYGEN SATURATION: 95 %

## 2018-05-14 DIAGNOSIS — R10.13 DYSPEPSIA: ICD-10-CM

## 2018-05-14 DIAGNOSIS — Z12.11 COLON CANCER SCREENING: ICD-10-CM

## 2018-05-14 DIAGNOSIS — E78.5 DM TYPE 2 WITH DIABETIC DYSLIPIDEMIA: Primary | ICD-10-CM

## 2018-05-14 DIAGNOSIS — I10 HYPERTENSION, UNSPECIFIED TYPE: ICD-10-CM

## 2018-05-14 DIAGNOSIS — E11.69 DM TYPE 2 WITH DIABETIC DYSLIPIDEMIA: Primary | ICD-10-CM

## 2018-05-14 PROBLEM — R07.89 CHEST PAIN, ATYPICAL: Status: RESOLVED | Noted: 2017-11-01 | Resolved: 2018-05-14

## 2018-05-14 PROBLEM — S92.501A CLOSED FRACTURE OF PHALANX OF RIGHT FIFTH TOE: Status: RESOLVED | Noted: 2017-08-23 | Resolved: 2018-05-14

## 2018-05-14 PROCEDURE — G0010 ADMIN HEPATITIS B VACCINE: HCPCS | Mod: PBBFAC,PO

## 2018-05-14 PROCEDURE — 99999 PR PBB SHADOW E&M-EST. PATIENT-LVL III: CPT | Mod: PBBFAC,,, | Performed by: FAMILY MEDICINE

## 2018-05-14 PROCEDURE — 99213 OFFICE O/P EST LOW 20 MIN: CPT | Mod: PBBFAC,PO | Performed by: FAMILY MEDICINE

## 2018-05-14 PROCEDURE — 99214 OFFICE O/P EST MOD 30 MIN: CPT | Mod: 25,S$PBB,, | Performed by: FAMILY MEDICINE

## 2018-05-14 RX ORDER — OMEPRAZOLE 20 MG/1
20 CAPSULE, DELAYED RELEASE ORAL DAILY
Qty: 30 CAPSULE | Refills: 11 | Status: SHIPPED | OUTPATIENT
Start: 2018-05-14 | End: 2018-09-05 | Stop reason: SDUPTHER

## 2018-05-14 NOTE — PROGRESS NOTES
Subjective:       Patient ID: Tamara Barriga is a 73 y.o. female.    Chief Complaint: Medication Refill    73 y old  Female  With Type 2 dm with feet neuropathy , depression  , htn , dlp , obesity  Here for f.u . On aspirin,  She has  Had 2 pneumonia vaccines , tdap less than 2 y , she also had zoster  ,  Will like hep B 92/3), exercises: not much , Opthalmology : due for parul .  checking FBS once weekly : 200.  + dyspepsia for 2 w regardless of type of meal . Taking OTC antiacids . On fosamax       Review of Systems   Constitutional: Negative.    HENT: Negative.    Eyes: Negative.    Respiratory: Negative.    Cardiovascular: Negative.    Gastrointestinal: Negative.    Genitourinary: Negative.    Musculoskeletal: Negative.    Skin: Negative.    Hematological: Negative.        Objective:      Physical Exam   Constitutional: She is oriented to person, place, and time. She appears well-developed and well-nourished. No distress.   HENT:   Head: Normocephalic and atraumatic.   Right Ear: External ear normal.   Left Ear: External ear normal.   Mouth/Throat: No oropharyngeal exudate.   Eyes: Conjunctivae and EOM are normal. Pupils are equal, round, and reactive to light. Right eye exhibits no discharge. Left eye exhibits no discharge. No scleral icterus.   Neck: Normal range of motion. Neck supple. No JVD present. No tracheal deviation present. No thyromegaly present.   Cardiovascular: Normal rate, regular rhythm and normal heart sounds.  Exam reveals no gallop and no friction rub.    No murmur heard.  Pulses:       Dorsalis pedis pulses are 2+ on the right side, and 2+ on the left side.        Posterior tibial pulses are 2+ on the right side, and 2+ on the left side.   Pulmonary/Chest: Effort normal and breath sounds normal. No stridor. No respiratory distress. She has no wheezes. She has no rales. She exhibits no tenderness.   Abdominal: Soft. Bowel sounds are normal. She exhibits no distension. There is no tenderness.  "There is no rebound and no guarding.   Musculoskeletal: Normal range of motion.        Right foot: There is normal range of motion and no deformity.        Left foot: There is normal range of motion and no deformity.   Feet:   Right Foot:   Protective Sensation: 10 sites tested. 0 sites sensed.   Skin Integrity: Negative for ulcer, blister, skin breakdown, erythema, warmth, callus or dry skin.   Left Foot:   Protective Sensation: 10 sites tested. 0 sites sensed.   Skin Integrity: Negative for blister, skin breakdown, erythema, warmth, callus or dry skin.   Lymphadenopathy:     She has no cervical adenopathy.   Neurological: She is alert and oriented to person, place, and time.   Skin: Skin is warm and dry. She is not diaphoretic.   Psychiatric: She has a normal mood and affect. Her behavior is normal. Judgment and thought content normal.       Assessment:     Tamara was seen today for medication refill.    Diagnoses and all orders for this visit:    DM type 2 with diabetic dyslipidemia  -     Ambulatory referral to Ophthalmology  -     CBC auto differential; Future  -     Comprehensive metabolic panel; Future  -     Hemoglobin A1c; Future  -     Lipid panel; Future    Dyspepsia  -     H.Pylori Antibody IgG; Future    Colon cancer screening  -     Fecal Immunochemical Test (iFOBT); Future    Hypertension, unspecified type    Other orders  -     pen needle, diabetic (NOVOFINE 30) 30 gauge x 1/3" Ndle; Use  BID  -     Hepatitis B Vaccine (Adult) (IM)  -     omeprazole (PRILOSEC) 20 MG capsule; Take 1 capsule (20 mg total) by mouth once daily.      Plan:   .Pt. encouraged to follow a strict diabetic type diet.   Encouraged daily physical activity/exercise for at least 30mins if tolerated.   Weight control recommenced as always.   Discussed how lifestyle changes make biggest impact on diabetes control.   Continue home glucose monitoring once daily and keep log.   Counseling provided today about hypoglycemia as well as " about how diabetes increases risk of cardiovascular, cerebrovascular ,peripheral vascular disease and other serious health complications. He has been instructed to call if developing any new symptoms or hypoglycemia related symptoms.   See encounter for associated orders/medications.   - Lipid panel; Future    PPI . EGD in 1 w if not improvement   Controlled. Cont med

## 2018-05-19 DIAGNOSIS — F51.01 PRIMARY INSOMNIA: ICD-10-CM

## 2018-05-19 RX ORDER — METFORMIN HYDROCHLORIDE 1000 MG/1
1000 TABLET ORAL 2 TIMES DAILY WITH MEALS
Qty: 180 TABLET | Refills: 0 | Status: CANCELLED | OUTPATIENT
Start: 2018-05-19

## 2018-05-19 RX ORDER — LORAZEPAM 1 MG/1
TABLET ORAL
Qty: 90 TABLET | Refills: 0 | Status: CANCELLED | OUTPATIENT
Start: 2018-05-19

## 2018-05-23 DIAGNOSIS — F51.01 PRIMARY INSOMNIA: ICD-10-CM

## 2018-05-23 RX ORDER — ATORVASTATIN CALCIUM 80 MG/1
80 TABLET, FILM COATED ORAL DAILY
Qty: 30 TABLET | Refills: 0 | Status: SHIPPED | OUTPATIENT
Start: 2018-05-23 | End: 2018-05-29 | Stop reason: SDUPTHER

## 2018-05-23 RX ORDER — LORAZEPAM 1 MG/1
1 TABLET ORAL NIGHTLY
Qty: 90 TABLET | Refills: 0 | Status: SHIPPED | OUTPATIENT
Start: 2018-05-23 | End: 2018-09-05 | Stop reason: SDUPTHER

## 2018-05-23 RX ORDER — METFORMIN HYDROCHLORIDE 1000 MG/1
1000 TABLET ORAL 2 TIMES DAILY WITH MEALS
Qty: 180 TABLET | Refills: 0 | Status: SHIPPED | OUTPATIENT
Start: 2018-05-23 | End: 2018-09-05 | Stop reason: SDUPTHER

## 2018-05-26 DIAGNOSIS — F51.01 PRIMARY INSOMNIA: ICD-10-CM

## 2018-05-29 RX ORDER — LORAZEPAM 1 MG/1
TABLET ORAL
Qty: 90 TABLET | Refills: 0 | OUTPATIENT
Start: 2018-05-29

## 2018-05-29 RX ORDER — ATORVASTATIN CALCIUM 80 MG/1
TABLET, FILM COATED ORAL
Qty: 90 TABLET | Refills: 0 | Status: SHIPPED | OUTPATIENT
Start: 2018-05-29 | End: 2018-09-01 | Stop reason: SDUPTHER

## 2018-06-07 ENCOUNTER — OFFICE VISIT (OUTPATIENT)
Dept: OPHTHALMOLOGY | Facility: CLINIC | Age: 73
End: 2018-06-07
Payer: MEDICARE

## 2018-06-07 DIAGNOSIS — E11.9 DIABETES MELLITUS TYPE 2 WITHOUT RETINOPATHY: Primary | ICD-10-CM

## 2018-06-07 DIAGNOSIS — H52.4 BILATERAL PRESBYOPIA: ICD-10-CM

## 2018-06-07 DIAGNOSIS — H25.043 POSTERIOR SUBCAPSULAR POLAR AGE-RELATED CATARACT, BILATERAL: ICD-10-CM

## 2018-06-07 DIAGNOSIS — H52.13 MYOPIA, BILATERAL: ICD-10-CM

## 2018-06-07 PROCEDURE — 92004 COMPRE OPH EXAM NEW PT 1/>: CPT | Mod: S$PBB,,, | Performed by: OPTOMETRIST

## 2018-06-07 PROCEDURE — 99212 OFFICE O/P EST SF 10 MIN: CPT | Mod: PBBFAC | Performed by: OPTOMETRIST

## 2018-06-07 PROCEDURE — 92015 DETERMINE REFRACTIVE STATE: CPT | Mod: ,,, | Performed by: OPTOMETRIST

## 2018-06-07 PROCEDURE — 99999 PR PBB SHADOW E&M-EST. PATIENT-LVL II: CPT | Mod: PBBFAC,,, | Performed by: OPTOMETRIST

## 2018-06-07 NOTE — PATIENT INSTRUCTIONS
Diabetes mellitus type 2 without retinopathy     Posterior subcapsular polar age-related cataract, bilateral     Myopia, bilateral     Bilateral presbyopia        No Background Diabetic Retinopathy     Significant cataracts OU, discussed cataract surgery including Specialty IOL's     Consult Ophthalmology for cataract evaluation at next available appointment.

## 2018-06-07 NOTE — PROGRESS NOTES
HPI     NIDDM Exam   New Patient   Glaucoma Screening  Pt Broke Most Recent Glasses From 1.5 Years Ago. She is Wearing Someone   elses Glasses She Found Around Her House & Her Night Vision Is Not Good   and She is Having Trouble Up Close and Far Away.   She Says She has Cataracts But Unsure Which eye    Last Exam Was 1.5 Years      Last edited by Burke Castillo on 6/7/2018  1:16 PM. (History)            Assessment /Plan     For exam results, see Encounter Report.    Diabetes mellitus type 2 without retinopathy    Posterior subcapsular polar age-related cataract, bilateral    Myopia, bilateral    Bilateral presbyopia      No Background Diabetic Retinopathy    Significant cataracts OU, discussed cataract surgery including Specialty IOL's    Consult Ophthalmology for cataract evaluation at next available appointment.

## 2018-06-07 NOTE — LETTER
June 7, 2018      Anne Juarez MD  [address] [facility] [address]           [facility]  [address] [place] [id number]  Phone: [phone]  Fax: [phone]          Patient: Tamara Barriga   MR Number: [id number]   YOB: 1945   Date of Visit: 6/7/2018       Dear Dr. Anne Juarez:    Thank you for referring Tamara Barriga to me for evaluation. Attached you will find relevant portions of my assessment and plan of care.    If you have questions, please do not hesitate to call me. I look forward to following Tamara Barriga along with you.    Sincerely,    Kimo Carias, OD    Enclosure  CC:  No Recipients    If you would like to receive this communication electronically, please contact [email or web address] or [phone] to request more information on [facility] access.    For providers and/or their staff who would like to refer a patient to [facility], please contact us through our one-stop-shop provider referral line, [facility], at [phone].    If you feel you have received this communication in error or would no longer like to receive these types of communications, please e-mail [email or web address]

## 2018-06-19 RX ORDER — GABAPENTIN 300 MG/1
300 CAPSULE ORAL NIGHTLY
Qty: 90 CAPSULE | Refills: 0 | Status: SHIPPED | OUTPATIENT
Start: 2018-06-19 | End: 2018-09-22 | Stop reason: SDUPTHER

## 2018-06-22 DIAGNOSIS — J06.9 UPPER RESPIRATORY TRACT INFECTION, UNSPECIFIED TYPE: ICD-10-CM

## 2018-06-22 RX ORDER — MONTELUKAST SODIUM 10 MG/1
TABLET ORAL
Qty: 30 TABLET | Refills: 0 | Status: SHIPPED | OUTPATIENT
Start: 2018-06-22 | End: 2018-07-24 | Stop reason: SDUPTHER

## 2018-06-22 RX ORDER — ATENOLOL 100 MG/1
TABLET ORAL
Qty: 90 TABLET | Refills: 0 | Status: SHIPPED | OUTPATIENT
Start: 2018-06-22 | End: 2018-09-05 | Stop reason: SDUPTHER

## 2018-07-02 RX ORDER — DICLOFENAC SODIUM 75 MG/1
75 TABLET, DELAYED RELEASE ORAL 2 TIMES DAILY PRN
Qty: 30 TABLET | Refills: 0 | Status: SHIPPED | OUTPATIENT
Start: 2018-07-02 | End: 2019-06-18

## 2018-07-24 DIAGNOSIS — J06.9 UPPER RESPIRATORY TRACT INFECTION, UNSPECIFIED TYPE: ICD-10-CM

## 2018-07-24 RX ORDER — MONTELUKAST SODIUM 10 MG/1
TABLET ORAL
Qty: 30 TABLET | Refills: 0 | Status: SHIPPED | OUTPATIENT
Start: 2018-07-24 | End: 2018-09-05 | Stop reason: SDUPTHER

## 2018-07-27 ENCOUNTER — OFFICE VISIT (OUTPATIENT)
Dept: OPHTHALMOLOGY | Facility: CLINIC | Age: 73
End: 2018-07-27
Payer: MEDICARE

## 2018-07-27 DIAGNOSIS — E11.9 DIABETES MELLITUS TYPE 2 WITHOUT RETINOPATHY: ICD-10-CM

## 2018-07-27 DIAGNOSIS — H25.013 CATARACT CORTICAL, SENILE, BILATERAL: Primary | ICD-10-CM

## 2018-07-27 PROCEDURE — 99999 PR PBB SHADOW E&M-EST. PATIENT-LVL II: CPT | Mod: PBBFAC,,, | Performed by: OPHTHALMOLOGY

## 2018-07-27 PROCEDURE — 99212 OFFICE O/P EST SF 10 MIN: CPT | Mod: PBBFAC,PO | Performed by: OPHTHALMOLOGY

## 2018-07-27 PROCEDURE — 92014 COMPRE OPH EXAM EST PT 1/>: CPT | Mod: S$PBB,,, | Performed by: OPHTHALMOLOGY

## 2018-07-27 RX ORDER — PREDNISOLONE ACETATE 10 MG/ML
1 SUSPENSION/ DROPS OPHTHALMIC 4 TIMES DAILY
Qty: 1 BOTTLE | Refills: 2 | Status: SHIPPED | OUTPATIENT
Start: 2018-07-27 | End: 2018-08-31

## 2018-07-27 RX ORDER — GATIFLOXACIN 5 MG/ML
1 SOLUTION/ DROPS OPHTHALMIC 2 TIMES DAILY
Qty: 1 BOTTLE | Refills: 2 | Status: SHIPPED | OUTPATIENT
Start: 2018-07-27 | End: 2018-11-06

## 2018-07-27 RX ORDER — KETOROLAC TROMETHAMINE 5 MG/ML
1 SOLUTION OPHTHALMIC 4 TIMES DAILY
Qty: 1 BOTTLE | Refills: 2 | Status: SHIPPED | OUTPATIENT
Start: 2018-07-27 | End: 2018-11-06

## 2018-07-27 NOTE — PROGRESS NOTES
SUBJECTIVE:   Tamara Barriga is a 73 y.o. female   Corrected distance visual acuity was 20/60 in the right eye and 20/70 in the left eye.   Chief Complaint   Patient presents with    Cataract     eval per TRF        HPI:  HPI     Cataract    Additional comments: eval per TRF           Comments   Pt here for cat eval per TRF. Pt states of the last year and a half, she   has noticed her vision declining. She says that now she can hardly see and   that it feels like she is looking through a tunnel. She says that she   notices that lights burst in her vision at night when riding in the car   with her son. No pain or discomfort. Pt says that in the morning over the   past week, she feels some sort of scratchy sensation in her left eye.     PCP: Dr. Poe    1. NSC OU  2. DM       Last edited by Henrique Campbell, Patient Care Assistant on 7/27/2018 10:09   AM. (History)        Assessment /Plan :  1. Cataract cortical, senile, bilateral  Visually Significant Cataract OU:   Patient reports decreased vision consistent with the clinical amount of lenticular opacity,  which reaches the level of visual significance and affects activities of daily living such as  read. Risks, benefits, and alternatives to cataract surgery were  discussed.  IOL options were discussed, including Premium IOL'S and the associated  side effects and additional patient cost associated with them as well as patient's visual  goals. The pt expressed a desire to proceed with surgery with the potential for some  reasonable degree of visual improvement and was consented.  Risks of loss of vision and eye reviewed as well as possibility of need for spectacle correction after surgery even with premium implants. Verbal and written preop  instructions were provided to the pt.            Pt is interested in traditional monofocal IOL aiming for:       Distance OU and understands that  glasses will be generally needed at all times for near vision and often for  finer distance correction especially for higher degrees of astigmatism.            Final visual acuity may be limited by astigmatism and diabetes    Pt wishes to have Phaco/IOL  OD     Requests a Monofocal IOL.    Will aim for distance    Other considerations: NONE       2. Diabetes mellitus type 2 without retinopathy No diabetic retinopathy at this time. Reviewed diabetic eye precautions including avoiding tobacco use,  Good glucose control, and importance of regular follow up.

## 2018-08-06 RX ORDER — DICLOFENAC SODIUM 75 MG/1
75 TABLET, DELAYED RELEASE ORAL 2 TIMES DAILY PRN
Qty: 30 TABLET | Refills: 0 | OUTPATIENT
Start: 2018-08-06

## 2018-08-10 ENCOUNTER — TELEPHONE (OUTPATIENT)
Dept: OPHTHALMOLOGY | Facility: CLINIC | Age: 73
End: 2018-08-10

## 2018-08-10 NOTE — TELEPHONE ENCOUNTER
Spoke with patient and advised arrival time for surgery scheduled 8/15 is 0730 and post op is 0745.  Patient was reminded to  her drops and to begin them on 8/14.

## 2018-08-14 ENCOUNTER — TELEPHONE (OUTPATIENT)
Dept: OPHTHALMOLOGY | Facility: CLINIC | Age: 73
End: 2018-08-14

## 2018-08-14 NOTE — TELEPHONE ENCOUNTER
Tried several times to reach patient on both numbers given, however unable to reach to advise of surgery reminders.

## 2018-08-16 ENCOUNTER — OFFICE VISIT (OUTPATIENT)
Dept: OPHTHALMOLOGY | Facility: CLINIC | Age: 73
End: 2018-08-16
Payer: MEDICARE

## 2018-08-16 DIAGNOSIS — Z98.890 POST-OPERATIVE STATE: Primary | ICD-10-CM

## 2018-08-16 PROCEDURE — 99024 POSTOP FOLLOW-UP VISIT: CPT | Mod: POP,,, | Performed by: OPHTHALMOLOGY

## 2018-08-16 PROCEDURE — 99999 PR PBB SHADOW E&M-EST. PATIENT-LVL II: CPT | Mod: PBBFAC,,, | Performed by: OPHTHALMOLOGY

## 2018-08-16 PROCEDURE — 99212 OFFICE O/P EST SF 10 MIN: CPT | Mod: PBBFAC,PO | Performed by: OPHTHALMOLOGY

## 2018-08-16 NOTE — PROGRESS NOTES
SUBJECTIVE:   Tamara Barriga is a 73 y.o. female   Uncorrected distance visual acuity was 20/40 -1 in the right eye and not recorded in the left eye.   Chief Complaint   Patient presents with    Post-op Evaluation     1 day PO PCIOL OD        HPI:  HPI     Post-op Evaluation      Additional comments: 1 day PO PCIOL OD              Comments     1 day PO PCIOL OD  No pain or discomfort  VA improving OD    PCP: Dr. Poe    1. NSC OS  PCIOL OD 8-15-18  2. DM    OD: Gatifloxacin BID, Ketorolac, Pred QID          Last edited by Jen Cheema on 8/16/2018  8:51 AM. (History)        Assessment /Plan :  1. Post-operative state Exam:  SLE:  S/C: normal  K : trace k fold  AC: trace cell and flare  Iris: normal  Lens: PCIOL    IMP:  PO Day 1 S/P Phaco/IOL OD : Doing well.    Plan:  Continue gtts to operative eye:  Pred 1% QID  Ketorolac QID  Zymaxid BID  Reinstructed in importance of absolute compliance with Post-OP instructions including medications, shield at bedtime, and limitation of activities. Follow up appointments in approximately one and six weeks or call immediately for increased pain, redness or vision loss.

## 2018-08-21 ENCOUNTER — OFFICE VISIT (OUTPATIENT)
Dept: OPHTHALMOLOGY | Facility: CLINIC | Age: 73
End: 2018-08-21
Payer: MEDICARE

## 2018-08-21 DIAGNOSIS — Z98.890 POST-OPERATIVE STATE: Primary | ICD-10-CM

## 2018-08-21 DIAGNOSIS — H25.12 NUCLEAR SCLEROSIS OF LEFT EYE: ICD-10-CM

## 2018-08-21 PROCEDURE — 99999 PR PBB SHADOW E&M-EST. PATIENT-LVL II: CPT | Mod: PBBFAC,,, | Performed by: OPHTHALMOLOGY

## 2018-08-21 PROCEDURE — 99212 OFFICE O/P EST SF 10 MIN: CPT | Mod: PBBFAC | Performed by: OPHTHALMOLOGY

## 2018-08-21 PROCEDURE — 92136 OPHTHALMIC BIOMETRY: CPT | Mod: PBBFAC,RT | Performed by: OPHTHALMOLOGY

## 2018-08-21 PROCEDURE — 99024 POSTOP FOLLOW-UP VISIT: CPT | Mod: POP,,, | Performed by: OPHTHALMOLOGY

## 2018-08-21 RX ORDER — GATIFLOXACIN 5 MG/ML
1 SOLUTION/ DROPS OPHTHALMIC 2 TIMES DAILY
Qty: 1 BOTTLE | Refills: 2 | Status: SHIPPED | OUTPATIENT
Start: 2018-08-21 | End: 2018-11-06

## 2018-08-21 RX ORDER — METFORMIN HYDROCHLORIDE 1000 MG/1
TABLET ORAL
Qty: 180 TABLET | Refills: 0 | OUTPATIENT
Start: 2018-08-21

## 2018-08-21 RX ORDER — PREDNISOLONE ACETATE 10 MG/ML
1 SUSPENSION/ DROPS OPHTHALMIC 4 TIMES DAILY
Qty: 1 BOTTLE | Refills: 2 | Status: SHIPPED | OUTPATIENT
Start: 2018-08-21 | End: 2018-08-31

## 2018-08-21 RX ORDER — ERGOCALCIFEROL 1.25 MG/1
50000 CAPSULE ORAL
Qty: 12 CAPSULE | Refills: 0 | OUTPATIENT
Start: 2018-08-21

## 2018-08-21 RX ORDER — KETOROLAC TROMETHAMINE 5 MG/ML
1 SOLUTION OPHTHALMIC 4 TIMES DAILY
Qty: 1 BOTTLE | Refills: 2 | Status: SHIPPED | OUTPATIENT
Start: 2018-08-21 | End: 2018-11-06

## 2018-08-21 NOTE — PROGRESS NOTES
SUBJECTIVE:   Tamara Barriga is a 73 y.o. female   Uncorrected distance visual acuity was 20/50 in the right eye and not recorded in the left eye.   Chief Complaint   Patient presents with    Post-op Evaluation     s/p 1 week phaco OD per pt doing well states she ready for the OS cataract to be taken out         HPI:  HPI     Post-op Evaluation      Additional comments: s/p 1 week phaco OD per pt doing well states she   ready for the OS cataract to be taken out               Comments     PCP: Dr. Poe    1. NSC OS  PCIOL OD +13.5 SN60WF (distance) 8-15-18  2. DM    OD: Gatifloxacin BID, Ketorolac, Pred QID          Last edited by Adriana Minor MA on 8/21/2018  8:58 AM. (History)        Assessment /Plan :  1. Post-operative state Slit lamp exam:  L/L: nl  K: clear, wound sealed  AC: 1+ cell and flare  Iris/Lens: IOL centered and stable      IMP:  PO Week 1 S/P Phaco/ IOL OD : Doing well with no evidence of infection or abnormal inflammation.     Plan:  Pt given and instructed in one week PO instructions. D/C zymaxid and start to taper off Ketorlac and Pred Forte 1% weekly. Can resume normal activitites and d/c eye shield. OTC reading glasses can be used until evaluated for final MR. Follow up in one month or PRN pain, redness, vision loss, or other concerns.     2. Nuclear sclerosis of left eye   Patient reports decreased vision in the fellow eye consistent with the clinical amount of lenticular opacity, which reaches the level of visual significance and affects activities of daily living including reading and glare. Risks, benefits, and alternatives to cataract surgery were discussed and pt desired to schedule cataract surgery. Pt was consented and the biometry and lens options were reviewed.    Schedule cataract surgery in OS       Pt is interested in traditional monofocal IOL aiming for:    Distance OU and understands that glasses will be generally needed at all times for near vision and often for  finer distance correction especially for higher degrees of astigmatism.       Final visual acuity may be limited by astigmatism and diabetes    Pt wishes to have Phaco/IOL  OS     Requests a Monofocal IOL.    Will aim for distance    Other considerations: None

## 2018-08-31 RX ORDER — PREDNISOLONE ACETATE 10 MG/ML
1 SUSPENSION/ DROPS OPHTHALMIC 4 TIMES DAILY
Qty: 1 BOTTLE | Refills: 2 | Status: SHIPPED | OUTPATIENT
Start: 2018-08-31 | End: 2018-11-06

## 2018-09-04 RX ORDER — DULOXETIN HYDROCHLORIDE 60 MG/1
60 CAPSULE, DELAYED RELEASE ORAL DAILY
Qty: 90 CAPSULE | Refills: 0 | Status: SHIPPED | OUTPATIENT
Start: 2018-09-04 | End: 2018-09-05 | Stop reason: SDUPTHER

## 2018-09-04 RX ORDER — ATORVASTATIN CALCIUM 80 MG/1
TABLET, FILM COATED ORAL
Qty: 90 TABLET | Refills: 0 | Status: SHIPPED | OUTPATIENT
Start: 2018-09-04 | End: 2018-09-05 | Stop reason: SDUPTHER

## 2018-09-05 ENCOUNTER — OFFICE VISIT (OUTPATIENT)
Dept: FAMILY MEDICINE | Facility: CLINIC | Age: 73
End: 2018-09-05
Payer: MEDICARE

## 2018-09-05 ENCOUNTER — TELEPHONE (OUTPATIENT)
Dept: FAMILY MEDICINE | Facility: CLINIC | Age: 73
End: 2018-09-05

## 2018-09-05 VITALS
OXYGEN SATURATION: 95 % | HEIGHT: 66 IN | DIASTOLIC BLOOD PRESSURE: 92 MMHG | TEMPERATURE: 98 F | WEIGHT: 203.06 LBS | SYSTOLIC BLOOD PRESSURE: 152 MMHG | BODY MASS INDEX: 32.64 KG/M2 | HEART RATE: 88 BPM

## 2018-09-05 DIAGNOSIS — E11.69 DM TYPE 2 WITH DIABETIC DYSLIPIDEMIA: Primary | ICD-10-CM

## 2018-09-05 DIAGNOSIS — Z12.11 COLON CANCER SCREENING: ICD-10-CM

## 2018-09-05 DIAGNOSIS — I10 HYPERTENSION, UNSPECIFIED TYPE: ICD-10-CM

## 2018-09-05 DIAGNOSIS — J06.9 UPPER RESPIRATORY TRACT INFECTION, UNSPECIFIED TYPE: ICD-10-CM

## 2018-09-05 DIAGNOSIS — E78.5 DM TYPE 2 WITH DIABETIC DYSLIPIDEMIA: Primary | ICD-10-CM

## 2018-09-05 DIAGNOSIS — F51.01 PRIMARY INSOMNIA: ICD-10-CM

## 2018-09-05 DIAGNOSIS — L98.9 SKIN LESION: ICD-10-CM

## 2018-09-05 PROCEDURE — 99999 PR PBB SHADOW E&M-EST. PATIENT-LVL IV: CPT | Mod: PBBFAC,,, | Performed by: FAMILY MEDICINE

## 2018-09-05 PROCEDURE — 99214 OFFICE O/P EST MOD 30 MIN: CPT | Mod: PBBFAC,PO | Performed by: FAMILY MEDICINE

## 2018-09-05 PROCEDURE — 99214 OFFICE O/P EST MOD 30 MIN: CPT | Mod: S$PBB,,, | Performed by: FAMILY MEDICINE

## 2018-09-05 RX ORDER — ATENOLOL 100 MG/1
100 TABLET ORAL DAILY
Qty: 90 TABLET | Refills: 0 | Status: SHIPPED | OUTPATIENT
Start: 2018-09-05 | End: 2018-11-06 | Stop reason: SDUPTHER

## 2018-09-05 RX ORDER — LORAZEPAM 1 MG/1
1 TABLET ORAL NIGHTLY
Qty: 90 TABLET | Refills: 0 | Status: SHIPPED | OUTPATIENT
Start: 2018-09-05 | End: 2018-11-06 | Stop reason: SDUPTHER

## 2018-09-05 RX ORDER — TELMISARTAN AND HYDROCHLORTHIAZIDE 40; 12.5 MG/1; MG/1
1 TABLET ORAL DAILY
Qty: 90 TABLET | Refills: 0 | Status: SHIPPED | OUTPATIENT
Start: 2018-09-05 | End: 2018-11-06 | Stop reason: SDUPTHER

## 2018-09-05 RX ORDER — DULOXETIN HYDROCHLORIDE 60 MG/1
60 CAPSULE, DELAYED RELEASE ORAL DAILY
Qty: 90 CAPSULE | Refills: 0 | Status: SHIPPED | OUTPATIENT
Start: 2018-09-05 | End: 2018-11-06 | Stop reason: SDUPTHER

## 2018-09-05 RX ORDER — MONTELUKAST SODIUM 10 MG/1
10 TABLET ORAL NIGHTLY
Qty: 30 TABLET | Refills: 0 | Status: SHIPPED | OUTPATIENT
Start: 2018-09-05 | End: 2018-11-06 | Stop reason: SDUPTHER

## 2018-09-05 RX ORDER — ATORVASTATIN CALCIUM 80 MG/1
80 TABLET, FILM COATED ORAL DAILY
Qty: 90 TABLET | Refills: 0 | Status: SHIPPED | OUTPATIENT
Start: 2018-09-05 | End: 2018-11-06 | Stop reason: SDUPTHER

## 2018-09-05 RX ORDER — INSULIN ASPART 100 [IU]/ML
INJECTION, SOLUTION INTRAVENOUS; SUBCUTANEOUS
Qty: 9 BOX | Refills: 3 | Status: SHIPPED | OUTPATIENT
Start: 2018-09-05 | End: 2018-11-06 | Stop reason: SDUPTHER

## 2018-09-05 RX ORDER — ALENDRONATE SODIUM 70 MG/1
TABLET ORAL
Qty: 12 TABLET | Refills: 3 | Status: SHIPPED | OUTPATIENT
Start: 2018-09-05 | End: 2018-11-06 | Stop reason: SDUPTHER

## 2018-09-05 RX ORDER — OMEPRAZOLE 20 MG/1
20 CAPSULE, DELAYED RELEASE ORAL DAILY
Qty: 30 CAPSULE | Refills: 11 | Status: SHIPPED | OUTPATIENT
Start: 2018-09-05 | End: 2018-11-06 | Stop reason: SDUPTHER

## 2018-09-05 RX ORDER — HYDROCHLOROTHIAZIDE 12.5 MG/1
12.5 CAPSULE ORAL DAILY
Qty: 90 CAPSULE | Refills: 0 | Status: SHIPPED | OUTPATIENT
Start: 2018-09-05 | End: 2018-11-06 | Stop reason: SDUPTHER

## 2018-09-05 RX ORDER — METFORMIN HYDROCHLORIDE 1000 MG/1
1000 TABLET ORAL 2 TIMES DAILY WITH MEALS
Qty: 180 TABLET | Refills: 0 | Status: SHIPPED | OUTPATIENT
Start: 2018-09-05 | End: 2018-11-06 | Stop reason: SDUPTHER

## 2018-09-05 NOTE — TELEPHONE ENCOUNTER
High repeated BP . HCTZ 12 .5 mg sent to pharmacy .  To take in addition to MYCARDIS . Try to be seen in [place] for BP check and labs([facility])  and call us with readings  In 1 w

## 2018-09-05 NOTE — PROGRESS NOTES
Subjective:       Patient ID: Tamara Barriga is a 73 y.o. female.    Chief Complaint: Medication Refill    73 y old  Female  With Type 2 dm with feet neuropathy , depression  , htn , dlp , obesity  Here for f.u . On aspirin,  She has  Had 1 pneumonia vaccines , tdap less than 2 y , she also had zoster  , exercises: not much , Opthalmology : seen by Dr Deja Casper  checking FBS once weekly : 140 .       Review of Systems   Constitutional: Negative.    HENT: Negative.    Eyes: Negative.    Respiratory: Negative.    Cardiovascular: Negative.    Gastrointestinal: Negative.    Genitourinary: Negative.    Musculoskeletal: Negative.    Skin: Negative.    Hematological: Negative.        Objective:      Physical Exam   Constitutional: She is oriented to person, place, and time. She appears well-developed and well-nourished. No distress.   HENT:   Head: Normocephalic and atraumatic.   Right Ear: External ear normal.   Left Ear: External ear normal.   Mouth/Throat: No oropharyngeal exudate.   Eyes: Conjunctivae and EOM are normal. Pupils are equal, round, and reactive to light. Right eye exhibits no discharge. Left eye exhibits no discharge. No scleral icterus.   Neck: Normal range of motion. Neck supple. No JVD present. No tracheal deviation present. No thyromegaly present.   Cardiovascular: Normal rate, regular rhythm and normal heart sounds. Exam reveals no gallop and no friction rub.   No murmur heard.  Pulmonary/Chest: Effort normal and breath sounds normal. No stridor. No respiratory distress. She has no wheezes. She has no rales. She exhibits no tenderness.   Abdominal: Soft. Bowel sounds are normal. She exhibits no distension. There is no tenderness. There is no rebound and no guarding.   Musculoskeletal: Normal range of motion.   Lymphadenopathy:     She has no cervical adenopathy.   Neurological: She is alert and oriented to person, place, and time.   Skin: Skin is warm and dry. She is not diaphoretic.    Psychiatric: She has a normal mood and affect. Her behavior is normal. Judgment and thought content normal.       Assessment:       Tamara was seen today for medication refill.    Diagnoses and all orders for this visit:    DM type 2 with diabetic dyslipidemia  -     CBC auto differential; Future  -     Comprehensive metabolic panel; Future  -     Hemoglobin A1c; Future  -     Lipid panel; Future  -     Microalbumin/creatinine urine ratio; Future    Skin lesion  -     Ambulatory consult to Dermatology    Colon cancer screening  -     Case request GI: COLONOSCOPY    Upper respiratory tract infection, unspecified type  -     montelukast (SINGULAIR) 10 mg tablet; Take 1 tablet (10 mg total) by mouth every evening.    Primary insomnia  -     LORazepam (ATIVAN) 1 MG tablet; Take 1 tablet (1 mg total) by mouth every evening.    Hypertension, unspecified type    Other orders  -     omeprazole (PRILOSEC) 20 MG capsule; Take 1 capsule (20 mg total) by mouth once daily.  -     metFORMIN (GLUCOPHAGE) 1000 MG tablet; Take 1 tablet (1,000 mg total) by mouth 2 (two) times daily with meals.  -     insulin detemir U-100 (LEVEMIR FLEXTOUCH) 100 unit/mL (3 mL) SubQ InPn pen; 75 units sq BID  -     insulin aspart U-100 (NOVOLOG FLEXPEN U-100 INSULIN) 100 unit/mL InPn pen; 25 units sq  before lunch  -     DULoxetine (CYMBALTA) 60 MG capsule; Take 1 capsule (60 mg total) by mouth once daily.  -     atorvastatin (LIPITOR) 80 MG tablet; Take 1 tablet (80 mg total) by mouth once daily.  -     atenolol (TENORMIN) 100 MG tablet; Take 1 tablet (100 mg total) by mouth once daily.  -     alendronate (FOSAMAX) 70 MG tablet; 1 po weekly in AM on empty stomach. Do not consume food or lie down for at least 30 minutes afterwards.      Plan:   . Stable and well controlled currently. Continue Current medications as prescribed. No medication changes made today.   Pt. encouraged to follow a strict diabetic type diet.   Encouraged daily physical  activity/exercise for at least 30mins if tolerated.   Weight control recommenced as always.   Discussed how lifestyle changes make biggest impact on diabetes control.   Continue home glucose monitoring once daily and keep log.   Counseling provided today about hypoglycemia as well as about how diabetes increases risk of cardiovascular, cerebrovascular ,peripheral vascular disease and other serious health complications. He has been instructed to call if developing any new symptoms or hypoglycemia related symptoms.   See encounter for associated orders/medications.   - Lipid panel; Future    Uncontrolled. Start HCTZ 12.5 mg . N.v in 3 days

## 2018-09-07 ENCOUNTER — DOCUMENTATION ONLY (OUTPATIENT)
Dept: ENDOSCOPY | Facility: HOSPITAL | Age: 73
End: 2018-09-07

## 2018-09-07 NOTE — PROGRESS NOTES
09/07/18 Per [employer], Person pressed touch tone key to speak with an endoscopy .  Stated she will call back later to schedule.

## 2018-09-14 RX ORDER — DULOXETIN HYDROCHLORIDE 60 MG/1
60 CAPSULE, DELAYED RELEASE ORAL DAILY
Qty: 90 CAPSULE | Refills: 0 | OUTPATIENT
Start: 2018-09-14

## 2018-09-25 RX ORDER — GABAPENTIN 300 MG/1
300 CAPSULE ORAL NIGHTLY
Qty: 90 CAPSULE | Refills: 0 | Status: SHIPPED | OUTPATIENT
Start: 2018-09-25 | End: 2018-11-06 | Stop reason: SDUPTHER

## 2018-10-25 RX ORDER — METFORMIN HYDROCHLORIDE 1000 MG/1
TABLET ORAL
Qty: 180 TABLET | Refills: 0 | OUTPATIENT
Start: 2018-10-25

## 2018-10-29 ENCOUNTER — TELEPHONE (OUTPATIENT)
Dept: FAMILY MEDICINE | Facility: CLINIC | Age: 73
End: 2018-10-29

## 2018-10-29 NOTE — TELEPHONE ENCOUNTER
----- Message from Terrance Chacon sent at 10/29/2018  1:28 PM CDT -----  Contact: self  Pt requesting call back wanting to know what pharmacy medication has been sent to and needs rx today. Pt uses     [id number] - [place] - [address] [facility] [place]  [address] [facility] [facility] [address]  Phone: [phone] Fax: [phone]    Please call back at [phone].    Thanks,  Terrance Chacon

## 2018-10-30 ENCOUNTER — TELEPHONE (OUTPATIENT)
Dept: FAMILY MEDICINE | Facility: CLINIC | Age: 73
End: 2018-10-30

## 2018-10-30 NOTE — TELEPHONE ENCOUNTER
----- Message from Mavis Douglas sent at 10/30/2018  2:42 PM CDT -----  Contact: self/[phone]  Would like to consult with nurse regarding medication been call in, please call back at [phone]. Thanks/ar

## 2018-11-06 ENCOUNTER — OFFICE VISIT (OUTPATIENT)
Dept: FAMILY MEDICINE | Facility: CLINIC | Age: 73
End: 2018-11-06
Payer: MEDICARE

## 2018-11-06 ENCOUNTER — HOSPITAL ENCOUNTER (OUTPATIENT)
Dept: RADIOLOGY | Facility: HOSPITAL | Age: 73
Discharge: HOME | End: 2018-11-06
Attending: FAMILY MEDICINE
Payer: MEDICARE

## 2018-11-06 VITALS
HEART RATE: 111 BPM | WEIGHT: 212.75 LBS | HEIGHT: 66 IN | BODY MASS INDEX: 34.19 KG/M2 | TEMPERATURE: 100 F | DIASTOLIC BLOOD PRESSURE: 82 MMHG | OXYGEN SATURATION: 95 % | SYSTOLIC BLOOD PRESSURE: 136 MMHG

## 2018-11-06 DIAGNOSIS — R50.9 FEVER, UNSPECIFIED FEVER CAUSE: ICD-10-CM

## 2018-11-06 DIAGNOSIS — R06.02 SOB (SHORTNESS OF BREATH): Primary | ICD-10-CM

## 2018-11-06 DIAGNOSIS — F51.01 PRIMARY INSOMNIA: ICD-10-CM

## 2018-11-06 DIAGNOSIS — R52 BODY ACHES: ICD-10-CM

## 2018-11-06 DIAGNOSIS — J40 BRONCHITIS: ICD-10-CM

## 2018-11-06 DIAGNOSIS — R06.02 SOB (SHORTNESS OF BREATH): ICD-10-CM

## 2018-11-06 DIAGNOSIS — J06.9 UPPER RESPIRATORY TRACT INFECTION, UNSPECIFIED TYPE: ICD-10-CM

## 2018-11-06 DIAGNOSIS — R79.9 ABNORMAL FINDING OF BLOOD CHEMISTRY: ICD-10-CM

## 2018-11-06 LAB
CTP QC/QA: YES
FLUAV AG NPH QL: NEGATIVE
FLUBV AG NPH QL: NEGATIVE
GLUCOSE SERPL-MCNC: 196 MG/DL (ref 70–110)

## 2018-11-06 PROCEDURE — 99999 PR PBB SHADOW E&M-EST. PATIENT-LVL IV: CPT | Mod: PBBFAC,,, | Performed by: FAMILY MEDICINE

## 2018-11-06 PROCEDURE — 71046 X-RAY EXAM CHEST 2 VIEWS: CPT | Mod: 26,,, | Performed by: RADIOLOGY

## 2018-11-06 PROCEDURE — 99214 OFFICE O/P EST MOD 30 MIN: CPT | Mod: S$PBB,,, | Performed by: FAMILY MEDICINE

## 2018-11-06 PROCEDURE — 71046 X-RAY EXAM CHEST 2 VIEWS: CPT | Mod: TC,PO

## 2018-11-06 PROCEDURE — 82948 REAGENT STRIP/BLOOD GLUCOSE: CPT | Mod: PBBFAC,PO | Performed by: FAMILY MEDICINE

## 2018-11-06 PROCEDURE — 99214 OFFICE O/P EST MOD 30 MIN: CPT | Mod: PBBFAC,25,PO | Performed by: FAMILY MEDICINE

## 2018-11-06 PROCEDURE — 87804 INFLUENZA ASSAY W/OPTIC: CPT | Mod: 59,PBBFAC,PO | Performed by: FAMILY MEDICINE

## 2018-11-06 RX ORDER — METFORMIN HYDROCHLORIDE 1000 MG/1
1000 TABLET ORAL 2 TIMES DAILY WITH MEALS
Qty: 180 TABLET | Refills: 0 | Status: SHIPPED | OUTPATIENT
Start: 2018-11-06 | End: 2019-02-16 | Stop reason: SDUPTHER

## 2018-11-06 RX ORDER — GABAPENTIN 300 MG/1
300 CAPSULE ORAL NIGHTLY
Qty: 90 CAPSULE | Refills: 0 | Status: SHIPPED | OUTPATIENT
Start: 2018-11-06 | End: 2018-11-12 | Stop reason: SDUPTHER

## 2018-11-06 RX ORDER — ATORVASTATIN CALCIUM 80 MG/1
80 TABLET, FILM COATED ORAL DAILY
Qty: 90 TABLET | Refills: 0 | Status: SHIPPED | OUTPATIENT
Start: 2018-11-06 | End: 2019-05-15 | Stop reason: SDUPTHER

## 2018-11-06 RX ORDER — DOXYCYCLINE 100 MG/1
100 CAPSULE ORAL 2 TIMES DAILY
Qty: 20 CAPSULE | Refills: 0 | Status: SHIPPED | OUTPATIENT
Start: 2018-11-06 | End: 2018-11-16

## 2018-11-06 RX ORDER — ALBUTEROL SULFATE 90 UG/1
2 AEROSOL, METERED RESPIRATORY (INHALATION) EVERY 6 HOURS PRN
Qty: 1 EACH | Refills: 0 | Status: SHIPPED | OUTPATIENT
Start: 2018-11-06 | End: 2019-06-18

## 2018-11-06 RX ORDER — ATENOLOL 100 MG/1
100 TABLET ORAL DAILY
Qty: 90 TABLET | Refills: 0 | Status: SHIPPED | OUTPATIENT
Start: 2018-11-06 | End: 2019-02-25 | Stop reason: SDUPTHER

## 2018-11-06 RX ORDER — HYDROCHLOROTHIAZIDE 12.5 MG/1
12.5 CAPSULE ORAL DAILY
Qty: 90 CAPSULE | Refills: 0 | Status: SHIPPED | OUTPATIENT
Start: 2018-11-06 | End: 2019-06-18 | Stop reason: SDUPTHER

## 2018-11-06 RX ORDER — DULOXETIN HYDROCHLORIDE 60 MG/1
60 CAPSULE, DELAYED RELEASE ORAL DAILY
Qty: 90 CAPSULE | Refills: 0 | Status: SHIPPED | OUTPATIENT
Start: 2018-11-06 | End: 2019-02-16 | Stop reason: SDUPTHER

## 2018-11-06 RX ORDER — ALENDRONATE SODIUM 70 MG/1
TABLET ORAL
Qty: 12 TABLET | Refills: 3 | Status: SHIPPED | OUTPATIENT
Start: 2018-11-06 | End: 2019-06-18 | Stop reason: SDUPTHER

## 2018-11-06 RX ORDER — INSULIN ASPART 100 [IU]/ML
INJECTION, SOLUTION INTRAVENOUS; SUBCUTANEOUS
Qty: 9 BOX | Refills: 3 | Status: SHIPPED | OUTPATIENT
Start: 2018-11-06 | End: 2019-03-04 | Stop reason: SDUPTHER

## 2018-11-06 RX ORDER — MONTELUKAST SODIUM 10 MG/1
10 TABLET ORAL NIGHTLY
Qty: 30 TABLET | Refills: 0 | Status: SHIPPED | OUTPATIENT
Start: 2018-11-06 | End: 2019-01-04

## 2018-11-06 RX ORDER — TELMISARTAN AND HYDROCHLORTHIAZIDE 40; 12.5 MG/1; MG/1
1 TABLET ORAL DAILY
Qty: 90 TABLET | Refills: 0 | Status: SHIPPED | OUTPATIENT
Start: 2018-11-06 | End: 2019-02-16 | Stop reason: SDUPTHER

## 2018-11-06 RX ORDER — OMEPRAZOLE 20 MG/1
20 CAPSULE, DELAYED RELEASE ORAL DAILY
Qty: 30 CAPSULE | Refills: 11 | Status: SHIPPED | OUTPATIENT
Start: 2018-11-06 | End: 2019-06-18 | Stop reason: SDUPTHER

## 2018-11-06 RX ORDER — LORAZEPAM 1 MG/1
1 TABLET ORAL NIGHTLY
Qty: 90 TABLET | Refills: 0 | Status: SHIPPED | OUTPATIENT
Start: 2018-11-06 | End: 2019-02-25 | Stop reason: SDUPTHER

## 2018-11-06 NOTE — PROGRESS NOTES
Subjective:       Patient ID: Tamara Barriga is a 73 y.o. female.    Chief Complaint: Cough and Sinus Problem (nasal congestion, ear pain )    73 y old female   Type 2 uncontrolled dm here with nasal congestion , productive cough  For 3 days  . No asthma , non smoker . + fever + Body aches. Grandson  has similar symptoms . Taking Delores seltzer plus . She has been out all her meds for 2 months since they were sent to [facility] as opposed to local [facility] .      Review of Systems   Constitutional: Negative.    HENT: Negative.    Eyes: Negative.    Respiratory: Positive for cough and shortness of breath.    Cardiovascular: Negative.    Gastrointestinal: Negative.    Genitourinary: Negative.    Musculoskeletal: Negative.    Skin: Negative.    Hematological: Negative.        Objective:      Physical Exam   Constitutional: She is oriented to person, place, and time. No distress.   HENT:   Head: Normocephalic and atraumatic.   Right Ear: External ear normal.   Left Ear: External ear normal.   Mouth/Throat: No oropharyngeal exudate.   Eyes: Conjunctivae and EOM are normal. Pupils are equal, round, and reactive to light. Right eye exhibits no discharge. Left eye exhibits no discharge. No scleral icterus.   Neck: Normal range of motion. Neck supple. No JVD present. No tracheal deviation present. No thyromegaly present.   Cardiovascular: Normal rate, regular rhythm and normal heart sounds. Exam reveals no gallop and no friction rub.   No murmur heard.  Pulmonary/Chest: Effort normal. No stridor. No respiratory distress. She has no wheezes. She has rhonchi in the right middle field, the right lower field, the left middle field and the left lower field. She has no rales. She exhibits no tenderness.   Abdominal: Soft. Bowel sounds are normal. She exhibits no distension. There is no tenderness. There is no rebound and no guarding.   Musculoskeletal: Normal range of motion.   Lymphadenopathy:     She has no cervical adenopathy.    Neurological: She is alert and oriented to person, place, and time.   Skin: Skin is warm and dry. She is not diaphoretic.   Psychiatric: She has a normal mood and affect. Her behavior is normal. Judgment and thought content normal.       Assessment:     SOB (shortness of breath)  -     X-Ray Chest PA And Lateral; Future; Expected date: 11/06/2018  -     POCT Influenza A/B  -     POCT Glucose    Fever, unspecified fever cause  -     X-Ray Chest PA And Lateral; Future; Expected date: 11/06/2018  -     POCT Influenza A/B  -     POCT Glucose    Body aches  -     X-Ray Chest PA And Lateral; Future; Expected date: 11/06/2018  -     POCT Glucose    Abnormal finding of blood chemistry   -     POCT Glucose    Upper respiratory tract infection, unspecified type  -     montelukast (SINGULAIR) 10 mg tablet; Take 1 tablet (10 mg total) by mouth every evening.  Dispense: 30 tablet; Refill: 0    Primary insomnia  -     LORazepam (ATIVAN) 1 MG tablet; Take 1 tablet (1 mg total) by mouth every evening.  Dispense: 90 tablet; Refill: 0    Bronchitis    Other orders  -     telmisartan-hydrochlorothiazide (MICARDIS HCT) 40-12.5 mg per tablet; Take 1 tablet by mouth once daily.  Dispense: 90 tablet; Refill: 0  -     omeprazole (PRILOSEC) 20 MG capsule; Take 1 capsule (20 mg total) by mouth once daily.  Dispense: 30 capsule; Refill: 11  -     metFORMIN (GLUCOPHAGE) 1000 MG tablet; Take 1 tablet (1,000 mg total) by mouth 2 (two) times daily with meals.  Dispense: 180 tablet; Refill: 0  -     insulin detemir U-100 (LEVEMIR FLEXTOUCH) 100 unit/mL (3 mL) SubQ InPn pen; 75 units sq BID  Dispense: 135 mL; Refill: 3  -     insulin aspart U-100 (NOVOLOG FLEXPEN U-100 INSULIN) 100 unit/mL InPn pen; 25 units sq  before lunch  Dispense: 9 Box; Refill: 3  -     hydroCHLOROthiazide (MICROZIDE) 12.5 mg capsule; Take 1 capsule (12.5 mg total) by mouth once daily.  Dispense: 90 capsule; Refill: 0  -     gabapentin (NEURONTIN) 300 MG capsule; Take 1  capsule (300 mg total) by mouth every evening.  Dispense: 90 capsule; Refill: 0  -     DULoxetine (CYMBALTA) 60 MG capsule; Take 1 capsule (60 mg total) by mouth once daily.  Dispense: 90 capsule; Refill: 0  -     atorvastatin (LIPITOR) 80 MG tablet; Take 1 tablet (80 mg total) by mouth once daily.  Dispense: 90 tablet; Refill: 0  -     atenolol (TENORMIN) 100 MG tablet; Take 1 tablet (100 mg total) by mouth once daily.  Dispense: 90 tablet; Refill: 0  -     alendronate (FOSAMAX) 70 MG tablet; 1 po weekly in AM on empty stomach. Do not consume food or lie down for at least 30 minutes afterwards.  Dispense: 12 tablet; Refill: 3  -     albuterol (PROVENTIL/VENTOLIN HFA) 90 mcg/actuation inhaler; Inhale 2 puffs into the lungs every 6 (six) hours as needed for Wheezing.  Dispense: 1 each; Refill: 0  -     doxycycline (VIBRAMYCIN) 100 MG Cap; Take 1 capsule (100 mg total) by mouth 2 (two) times daily. for 10 days  Dispense: 20 capsule; Refill: 0      Plan:   We will cont to f.u . CXR did not show any acute findings     WS discussed. F.u in 1 w

## 2018-11-12 ENCOUNTER — OFFICE VISIT (OUTPATIENT)
Dept: FAMILY MEDICINE | Facility: CLINIC | Age: 73
End: 2018-11-12
Payer: MEDICARE

## 2018-11-12 ENCOUNTER — HOSPITAL ENCOUNTER (OUTPATIENT)
Dept: RADIOLOGY | Facility: HOSPITAL | Age: 73
Discharge: HOME | End: 2018-11-12
Attending: FAMILY MEDICINE
Payer: MEDICARE

## 2018-11-12 VITALS
SYSTOLIC BLOOD PRESSURE: 136 MMHG | OXYGEN SATURATION: 96 % | WEIGHT: 201.19 LBS | HEART RATE: 114 BPM | DIASTOLIC BLOOD PRESSURE: 84 MMHG | BODY MASS INDEX: 32.33 KG/M2 | HEIGHT: 66 IN | TEMPERATURE: 98 F

## 2018-11-12 DIAGNOSIS — R06.02 SOB (SHORTNESS OF BREATH): ICD-10-CM

## 2018-11-12 DIAGNOSIS — E11.69 DM TYPE 2 WITH DIABETIC DYSLIPIDEMIA: ICD-10-CM

## 2018-11-12 DIAGNOSIS — R92.2 INCONCLUSIVE MAMMOGRAM: ICD-10-CM

## 2018-11-12 DIAGNOSIS — I10 HYPERTENSION, UNSPECIFIED TYPE: ICD-10-CM

## 2018-11-12 DIAGNOSIS — R00.0 TACHYCARDIA: ICD-10-CM

## 2018-11-12 DIAGNOSIS — N63.0 BREAST MASS: Primary | ICD-10-CM

## 2018-11-12 DIAGNOSIS — E78.5 DM TYPE 2 WITH DIABETIC DYSLIPIDEMIA: ICD-10-CM

## 2018-11-12 PROCEDURE — 99214 OFFICE O/P EST MOD 30 MIN: CPT | Mod: S$PBB,,, | Performed by: FAMILY MEDICINE

## 2018-11-12 PROCEDURE — 99999 PR PBB SHADOW E&M-EST. PATIENT-LVL V: CPT | Mod: PBBFAC,,, | Performed by: FAMILY MEDICINE

## 2018-11-12 PROCEDURE — 71046 X-RAY EXAM CHEST 2 VIEWS: CPT | Mod: TC,PO

## 2018-11-12 PROCEDURE — 93010 ELECTROCARDIOGRAM REPORT: CPT | Mod: ,,, | Performed by: INTERNAL MEDICINE

## 2018-11-12 PROCEDURE — 93005 ELECTROCARDIOGRAM TRACING: CPT | Mod: PBBFAC,PO | Performed by: FAMILY MEDICINE

## 2018-11-12 PROCEDURE — 99215 OFFICE O/P EST HI 40 MIN: CPT | Mod: PBBFAC,25,PO | Performed by: FAMILY MEDICINE

## 2018-11-12 PROCEDURE — 71046 X-RAY EXAM CHEST 2 VIEWS: CPT | Mod: 26,,, | Performed by: RADIOLOGY

## 2018-11-12 RX ORDER — ERGOCALCIFEROL 1.25 MG/1
50000 CAPSULE ORAL
Qty: 12 CAPSULE | Refills: 0 | Status: SHIPPED | OUTPATIENT
Start: 2018-11-12 | End: 2019-02-25 | Stop reason: SDUPTHER

## 2018-11-12 RX ORDER — GABAPENTIN 300 MG/1
300 CAPSULE ORAL NIGHTLY
Qty: 90 CAPSULE | Refills: 0 | Status: SHIPPED | OUTPATIENT
Start: 2018-11-12 | End: 2019-06-18

## 2018-11-12 NOTE — PROGRESS NOTES
Subjective:       Patient ID: Tamara Barriga is a 73 y.o. female.    Chief Complaint: Cough (productive)    73 y old here for f/u on DM, htn and dlp . Not  On aspirin, due  For last hep b , flu and pneumovax  , exercises : not much . Tries to eat healthy .  Opthalmology : due for parul , She has not filled any insulin since they were rf last week . Also to f.u on Bronchitis , Started on doxy and albuterol. Still coughing up .  No fever . Chest feels  tight       Review of Systems   Constitutional: Negative.    HENT: Negative.    Eyes: Negative.    Respiratory: Positive for shortness of breath.    Cardiovascular: Positive for chest pain.   Gastrointestinal: Negative.    Genitourinary: Negative.    Musculoskeletal: Negative.    Skin: Negative.    Hematological: Negative.        Objective:      Physical Exam   Constitutional: She is oriented to person, place, and time. No distress.   HENT:   Head: Normocephalic and atraumatic.   Right Ear: External ear normal.   Left Ear: External ear normal.   Mouth/Throat: No oropharyngeal exudate.   Eyes: Conjunctivae and EOM are normal. Pupils are equal, round, and reactive to light. Right eye exhibits no discharge. Left eye exhibits no discharge. No scleral icterus.   Neck: Normal range of motion. Neck supple. No JVD present. No tracheal deviation present. No thyromegaly present.   Cardiovascular: Normal rate, regular rhythm and normal heart sounds. Exam reveals no gallop and no friction rub.   No murmur heard.  Pulmonary/Chest: Effort normal. No stridor. No respiratory distress. She has decreased breath sounds in the right upper field, the right middle field, the right lower field, the left upper field, the left middle field and the left lower field. She has no wheezes. She has no rales. She exhibits no tenderness.   Abdominal: Soft. Bowel sounds are normal. She exhibits no distension. There is no tenderness. There is no rebound and no guarding.   Musculoskeletal: Normal range  of motion.   Lymphadenopathy:     She has no cervical adenopathy.   Neurological: She is alert and oriented to person, place, and time.   Skin: Skin is warm and dry. She is not diaphoretic.   Psychiatric: She has a normal mood and affect. Her behavior is normal. Judgment and thought content normal.       Assessment:     Tamara was seen today for cough.    Diagnoses and all orders for this visit:    Breast mass  -     US Breast Left Limited; Future  -     Mammo Digital Diagnostic Bilateral With CAD; Future    SOB (shortness of breath)  -     X-Ray Chest PA And Lateral; Future    Inconclusive mammogram   -     Mammo Digital Diagnostic Bilateral With CAD; Future    DM type 2 with diabetic dyslipidemia    Hypertension, unspecified type    Other orders  -     ergocalciferol (VITAMIN D2) 50,000 unit Cap; Take 1 capsule (50,000 Units total) by mouth every 7 days.  -     gabapentin (NEURONTIN) 300 MG capsule; Take 1 capsule (300 mg total) by mouth every evening.      Plan:   Mammo    Pt. encouraged to follow a strict diabetic type diet.   Encouraged daily physical activity/exercise for at least 30mins if tolerated.   Weight control recommenced as always.   Discussed how lifestyle changes make biggest impact on diabetes control.   Continue home glucose monitoring once daily and keep log.   Counseling provided today about hypoglycemia as well as about how diabetes increases risk of cardiovascular, cerebrovascular ,peripheral vascular disease and other serious health complications. He has been instructed to call if developing any new symptoms or hypoglycemia related symptoms.   See encounter for associated orders/medications.   - Lipid panel; Future  Controlled. Cont med  Will see Card for tachycardia and abnormal EKG

## 2018-11-13 ENCOUNTER — TELEPHONE (OUTPATIENT)
Dept: FAMILY MEDICINE | Facility: CLINIC | Age: 73
End: 2018-11-13

## 2018-11-13 DIAGNOSIS — E11.69 DM TYPE 2 WITH DIABETIC DYSLIPIDEMIA: Primary | ICD-10-CM

## 2018-11-13 DIAGNOSIS — E78.5 DM TYPE 2 WITH DIABETIC DYSLIPIDEMIA: Primary | ICD-10-CM

## 2018-11-13 DIAGNOSIS — E83.52 HYPERCALCEMIA: ICD-10-CM

## 2018-11-14 ENCOUNTER — OFFICE VISIT (OUTPATIENT)
Dept: CARDIOLOGY | Facility: CLINIC | Age: 73
End: 2018-11-14
Payer: MEDICARE

## 2018-11-14 VITALS
WEIGHT: 200.31 LBS | HEART RATE: 72 BPM | HEIGHT: 66 IN | SYSTOLIC BLOOD PRESSURE: 130 MMHG | DIASTOLIC BLOOD PRESSURE: 70 MMHG | BODY MASS INDEX: 32.19 KG/M2

## 2018-11-14 DIAGNOSIS — E11.69 DM TYPE 2 WITH DIABETIC DYSLIPIDEMIA: Primary | ICD-10-CM

## 2018-11-14 DIAGNOSIS — G89.29 CHRONIC CHEST PAIN: ICD-10-CM

## 2018-11-14 DIAGNOSIS — I10 ESSENTIAL HYPERTENSION: ICD-10-CM

## 2018-11-14 DIAGNOSIS — R07.9 CHRONIC CHEST PAIN: ICD-10-CM

## 2018-11-14 DIAGNOSIS — E78.5 DM TYPE 2 WITH DIABETIC DYSLIPIDEMIA: Primary | ICD-10-CM

## 2018-11-14 PROCEDURE — 99213 OFFICE O/P EST LOW 20 MIN: CPT | Mod: PBBFAC | Performed by: INTERNAL MEDICINE

## 2018-11-14 PROCEDURE — 99999 PR PBB SHADOW E&M-EST. PATIENT-LVL III: CPT | Mod: PBBFAC,,, | Performed by: INTERNAL MEDICINE

## 2018-11-14 PROCEDURE — 99205 OFFICE O/P NEW HI 60 MIN: CPT | Mod: S$PBB,,, | Performed by: INTERNAL MEDICINE

## 2018-11-14 NOTE — LETTER
November 19, 2018      Anne Juarez MD  [address] [place] [address]           [facility]  [address] [place] [id number]  Phone: [phone]  Fax: [phone]          Patient: Tamara Barriga   MR Number: [id number]   YOB: 1945   Date of Visit: 11/14/2018       Dear Dr. Anne Juarez:    Thank you for referring Tamara Barriga to me for evaluation. Attached you will find relevant portions of my assessment and plan of care.    If you have questions, please do not hesitate to call me. I look forward to following Tamara Barriga along with you.    Sincerely,    Kali Acosta MD    Enclosure  CC:  No Recipients    If you would like to receive this communication electronically, please contact [email or web address] or [phone] to request more information on [facility] access.    For providers and/or their staff who would like to refer a patient to [facility], please contact us through our one-stop-shop provider referral line, [facility], at [phone].    If you feel you have received this communication in error or would no longer like to receive these types of communications, please e-mail [email or web address]

## 2018-11-14 NOTE — PROGRESS NOTES
Subjective:   Patient ID:  Tamara Barriga is a 73 y.o. female who presents for follow-up of Consult (tachycardia per pt she us use to it)  Pt with long history of palpitations  and tachycardia with worsening symptoms.Pt with occasional chest tightness and SOB.  Echo-11-17 nml  CRF- HTN, DM, HLP  Hypertension   This is a chronic problem. The current episode started more than 1 year ago. The problem has been gradually improving since onset. The problem is controlled. Associated symptoms include chest pain and shortness of breath. Pertinent negatives include no palpitations. Past treatments include angiotensin blockers, diuretics and beta blockers. The current treatment provides moderate improvement. Compliance problems include exercise.    Chest Pain    This is a new problem. The current episode started 1 to 4 weeks ago. The problem occurs intermittently. The problem has been waxing and waning. The pain is present in the lateral region. The pain is mild. The quality of the pain is described as dull. The pain does not radiate. Associated symptoms include shortness of breath. Pertinent negatives include no dizziness or palpitations. The pain is aggravated by nothing. She has tried rest for the symptoms. The treatment provided moderate relief.   Pertinent negatives for past medical history include no muscle weakness.       Review of Systems   Constitution: Negative. Negative for weight gain.   HENT: Negative.    Eyes: Negative.    Cardiovascular: Positive for chest pain. Negative for leg swelling and palpitations.   Respiratory: Positive for shortness of breath.    Endocrine: Negative.    Hematologic/Lymphatic: Negative.    Skin: Negative.    Musculoskeletal: Negative for muscle weakness.   Gastrointestinal: Negative.    Genitourinary: Negative.    Neurological: Negative.  Negative for dizziness.   Psychiatric/Behavioral: Negative.    Allergic/Immunologic: Negative.      Family History   Problem Relation Age of Onset     Cancer Daughter         Breast Ca     Breast cancer Sister      Past Medical History:   Diagnosis Date    Anxiety     Cataract     Diabetes mellitus, type 2 few weeks ago     Pt says is was good    Hyperlipidemia     Hypertension     Insomnia      Current Outpatient Medications on File Prior to Visit   Medication Sig Dispense Refill    albuterol (PROVENTIL/VENTOLIN HFA) 90 mcg/actuation inhaler Inhale 2 puffs into the lungs every 6 (six) hours as needed for Wheezing. 1 each 0    alendronate (FOSAMAX) 70 MG tablet 1 po weekly in AM on empty stomach. Do not consume food or lie down for at least 30 minutes afterwards. 12 tablet 3    atenolol (TENORMIN) 100 MG tablet Take 1 tablet (100 mg total) by mouth once daily. 90 tablet 0    atorvastatin (LIPITOR) 80 MG tablet Take 1 tablet (80 mg total) by mouth once daily. 90 tablet 0    blood sugar diagnostic (BLOOD GLUCOSE TEST) Strp 1 strip by Misc.(Non-Drug; Combo Route) route 4 (four) times daily before meals and nightly. True track glucose test strips 100 strip 2    diclofenac (VOLTAREN) 75 MG EC tablet TAKE 1 TABLET (75 MG TOTAL) BY MOUTH 2 (TWO) TIMES DAILY AS NEEDED. 30 tablet 0    doxycycline (VIBRAMYCIN) 100 MG Cap Take 1 capsule (100 mg total) by mouth 2 (two) times daily. for 10 days 20 capsule 0    DULoxetine (CYMBALTA) 60 MG capsule Take 1 capsule (60 mg total) by mouth once daily. 90 capsule 0    gabapentin (NEURONTIN) 300 MG capsule Take 1 capsule (300 mg total) by mouth every evening. 90 capsule 0    hydroCHLOROthiazide (MICROZIDE) 12.5 mg capsule Take 1 capsule (12.5 mg total) by mouth once daily. 90 capsule 0    insulin aspart U-100 (NOVOLOG FLEXPEN U-100 INSULIN) 100 unit/mL InPn pen 25 units sq  before lunch 9 Box 3    insulin detemir U-100 (LEVEMIR FLEXTOUCH) 100 unit/mL (3 mL) SubQ InPn pen 75 units sq  mL 3    LORazepam (ATIVAN) 1 MG tablet Take 1 tablet (1 mg total) by mouth every evening. 90 tablet 0    metFORMIN  "(GLUCOPHAGE) 1000 MG tablet Take 1 tablet (1,000 mg total) by mouth 2 (two) times daily with meals. 180 tablet 0    montelukast (SINGULAIR) 10 mg tablet Take 1 tablet (10 mg total) by mouth every evening. 30 tablet 0    omeprazole (PRILOSEC) 20 MG capsule Take 1 capsule (20 mg total) by mouth once daily. 30 capsule 11    pen needle, diabetic (NOVOFINE 30) 30 gauge x 1/3" Ndle Use   each 0    pen needle, diabetic (PEN NEEDLE MISC) by Misc.(Non-Drug; Combo Route) route.      telmisartan-hydrochlorothiazide (MICARDIS HCT) 40-12.5 mg per tablet Take 1 tablet by mouth once daily. 90 tablet 0    ergocalciferol (VITAMIN D2) 50,000 unit Cap Take 1 capsule (50,000 Units total) by mouth every 7 days. 12 capsule 0     No current facility-administered medications on file prior to visit.      Review of patient's allergies indicates:  No Known Allergies    Objective:     Physical Exam   Constitutional: She is oriented to person, place, and time. She appears well-developed and well-nourished.   HENT:   Head: Normocephalic and atraumatic.   Eyes: Conjunctivae and EOM are normal. Pupils are equal, round, and reactive to light.   Neck: Normal range of motion. Neck supple.   Cardiovascular: Normal rate, regular rhythm, normal heart sounds and intact distal pulses.   Pulmonary/Chest: Effort normal and breath sounds normal.   Abdominal: Soft. Bowel sounds are normal.   Musculoskeletal: Normal range of motion.   Neurological: She is alert and oriented to person, place, and time.   Skin: Skin is warm and dry.   Psychiatric: She has a normal mood and affect.   Nursing note and vitals reviewed.      Assessment:     1. DM type 2 with diabetic dyslipidemia    2. Essential hypertension        Plan:     DM type 2 with diabetic dyslipidemia    Essential hypertension    Other orders  -     Cancel: SCHEDULED EKG 12-LEAD (to Muse); Future      Stress test, holter  Continue micardis-hctz, atenolol, -htn  "

## 2018-11-15 ENCOUNTER — TELEPHONE (OUTPATIENT)
Dept: DIABETES | Facility: CLINIC | Age: 73
End: 2018-11-15

## 2018-11-15 NOTE — TELEPHONE ENCOUNTER
Attempted to contact pt to schedule an appt with dm education. No answer/mailox is not set up. Unable to lm.

## 2018-11-19 RX ORDER — DICLOFENAC SODIUM 75 MG/1
75 TABLET, DELAYED RELEASE ORAL 2 TIMES DAILY PRN
Qty: 30 TABLET | Refills: 0 | Status: SHIPPED | OUTPATIENT
Start: 2018-11-19 | End: 2018-12-10 | Stop reason: SDUPTHER

## 2018-11-21 ENCOUNTER — PES CALL (OUTPATIENT)
Dept: ADMINISTRATIVE | Facility: CLINIC | Age: 73
End: 2018-11-21

## 2018-11-26 ENCOUNTER — HOSPITAL ENCOUNTER (OUTPATIENT)
Dept: RADIOLOGY | Facility: HOSPITAL | Age: 73
Discharge: HOME | End: 2018-11-26
Attending: FAMILY MEDICINE
Payer: MEDICARE

## 2018-11-26 DIAGNOSIS — N63.0 BREAST MASS: ICD-10-CM

## 2018-11-26 DIAGNOSIS — R92.2 INCONCLUSIVE MAMMOGRAM: ICD-10-CM

## 2018-11-26 PROCEDURE — 76642 ULTRASOUND BREAST LIMITED: CPT | Mod: 26,LT,, | Performed by: RADIOLOGY

## 2018-11-26 PROCEDURE — 77062 BREAST TOMOSYNTHESIS BI: CPT | Mod: TC,PO

## 2018-11-26 PROCEDURE — 76642 ULTRASOUND BREAST LIMITED: CPT | Mod: TC,PO,LT

## 2018-11-26 PROCEDURE — 77066 DX MAMMO INCL CAD BI: CPT | Mod: 26,,, | Performed by: RADIOLOGY

## 2018-11-26 PROCEDURE — 77062 BREAST TOMOSYNTHESIS BI: CPT | Mod: 26,,, | Performed by: RADIOLOGY

## 2018-11-27 ENCOUNTER — TELEPHONE (OUTPATIENT)
Dept: FAMILY MEDICINE | Facility: CLINIC | Age: 73
End: 2018-11-27

## 2018-11-27 ENCOUNTER — TELEPHONE (OUTPATIENT)
Dept: DIABETES | Facility: CLINIC | Age: 73
End: 2018-11-27

## 2018-11-27 DIAGNOSIS — N63.20 BREAST MASS, LEFT: Primary | ICD-10-CM

## 2018-11-27 DIAGNOSIS — R92.2 INCONCLUSIVE MAMMOGRAM: ICD-10-CM

## 2018-12-05 ENCOUNTER — CLINICAL SUPPORT (OUTPATIENT)
Dept: CARDIOLOGY | Facility: CLINIC | Age: 73
End: 2018-12-05
Attending: INTERNAL MEDICINE
Payer: MEDICARE

## 2018-12-05 ENCOUNTER — HOSPITAL ENCOUNTER (OUTPATIENT)
Dept: RADIOLOGY | Facility: HOSPITAL | Age: 73
Discharge: HOME | End: 2018-12-05
Attending: INTERNAL MEDICINE
Payer: MEDICARE

## 2018-12-05 DIAGNOSIS — R07.9 CHRONIC CHEST PAIN: ICD-10-CM

## 2018-12-05 DIAGNOSIS — G89.29 CHRONIC CHEST PAIN: ICD-10-CM

## 2018-12-05 LAB — DIASTOLIC DYSFUNCTION: NO

## 2018-12-05 PROCEDURE — 93226 XTRNL ECG REC<48 HR SCAN A/R: CPT | Mod: PBBFAC,PO | Performed by: INTERNAL MEDICINE

## 2018-12-05 PROCEDURE — 93016 CV STRESS TEST SUPVJ ONLY: CPT | Mod: S$PBB,,, | Performed by: NUCLEAR MEDICINE

## 2018-12-05 PROCEDURE — 93018 CV STRESS TEST I&R ONLY: CPT | Mod: S$PBB,,, | Performed by: NUCLEAR MEDICINE

## 2018-12-05 PROCEDURE — 78452 HT MUSCLE IMAGE SPECT MULT: CPT | Mod: TC,PO

## 2018-12-05 PROCEDURE — 93227 XTRNL ECG REC<48 HR R&I: CPT | Mod: S$PBB,,, | Performed by: INTERNAL MEDICINE

## 2018-12-05 PROCEDURE — 78452 HT MUSCLE IMAGE SPECT MULT: CPT | Mod: 26,,, | Performed by: NUCLEAR MEDICINE

## 2018-12-05 PROCEDURE — 93017 CV STRESS TEST TRACING ONLY: CPT | Mod: PBBFAC,PO | Performed by: NUCLEAR MEDICINE

## 2018-12-07 ENCOUNTER — TELEPHONE (OUTPATIENT)
Dept: CARDIOLOGY | Facility: CLINIC | Age: 73
End: 2018-12-07

## 2018-12-07 NOTE — TELEPHONE ENCOUNTER
"12/07 Called and got a recording that "the mailbox has not been set up yet". Cm    ----- Message from Kali Acosta MD sent at 12/5/2018  9:11 PM CST -----  Please tell pt stress test is nml    "

## 2018-12-10 ENCOUNTER — OFFICE VISIT (OUTPATIENT)
Dept: OPHTHALMOLOGY | Facility: CLINIC | Age: 73
End: 2018-12-10
Payer: MEDICARE

## 2018-12-10 ENCOUNTER — TELEPHONE (OUTPATIENT)
Dept: CARDIOLOGY | Facility: CLINIC | Age: 73
End: 2018-12-10

## 2018-12-10 DIAGNOSIS — Z96.1 PSEUDOPHAKIA: ICD-10-CM

## 2018-12-10 DIAGNOSIS — E11.9 DIABETES MELLITUS TYPE 2 WITHOUT RETINOPATHY: ICD-10-CM

## 2018-12-10 DIAGNOSIS — H25.012 CATARACT CORTICAL, SENILE, LEFT: Primary | ICD-10-CM

## 2018-12-10 DIAGNOSIS — H04.129 DRY EYE: ICD-10-CM

## 2018-12-10 PROCEDURE — 92014 COMPRE OPH EXAM EST PT 1/>: CPT | Mod: S$PBB,,, | Performed by: OPHTHALMOLOGY

## 2018-12-10 PROCEDURE — 99999 PR PBB SHADOW E&M-EST. PATIENT-LVL II: CPT | Mod: PBBFAC,,, | Performed by: OPHTHALMOLOGY

## 2018-12-10 PROCEDURE — 99212 OFFICE O/P EST SF 10 MIN: CPT | Mod: PBBFAC,PO | Performed by: OPHTHALMOLOGY

## 2018-12-10 RX ORDER — KETOROLAC TROMETHAMINE 5 MG/ML
1 SOLUTION OPHTHALMIC 4 TIMES DAILY
Qty: 1 BOTTLE | Refills: 2 | Status: SHIPPED | OUTPATIENT
Start: 2018-12-10 | End: 2019-02-19

## 2018-12-10 RX ORDER — GATIFLOXACIN 5 MG/ML
1 SOLUTION/ DROPS OPHTHALMIC 2 TIMES DAILY
Qty: 1 BOTTLE | Refills: 2 | Status: SHIPPED | OUTPATIENT
Start: 2018-12-10 | End: 2019-02-19

## 2018-12-10 RX ORDER — PREDNISOLONE ACETATE 10 MG/ML
1 SUSPENSION/ DROPS OPHTHALMIC 4 TIMES DAILY
Qty: 1 BOTTLE | Refills: 2 | Status: SHIPPED | OUTPATIENT
Start: 2018-12-10 | End: 2019-02-19

## 2018-12-10 NOTE — PROGRESS NOTES
SUBJECTIVE:   Tamara Barriga is a 73 y.o. female   Uncorrected distance visual acuity was 20/40 in the right eye and 20/400 in the left eye.   Chief Complaint   Patient presents with    Cataract     CT eval OS        HPI:  HPI     Cataract      Additional comments: CT eval OS              Comments     The patient is here to evaluate and schedule her left eye for surgery.   The patient states her eye is still very blurry and the glare at night   time bothers her left eye. The patient denies any ocular pain at this   time.    PCP: Dr. Poe    1. NSC OS  2. PCIOL OD +13.5 SN60WF (distance) 8-15-18  3. DM          Last edited by Bernice Durbin on 12/10/2018  2:24 PM. (History)        Assessment /Plan   1. Cataract cortical, senile, left   Patient reports decreased vision in the fellow eye consistent with the clinical amount of lenticular opacity, which reaches the level of visual significance and affects activities of daily living including reading and glare. Risks, benefits, and alternatives to cataract surgery were discussed and pt desired to schedule cataract surgery. Pt was consented and the biometry and lens options were reviewed.    Schedule cataract surgery in OS       Pt is interested in traditional monofocal IOL aiming for:    Distance OU and understands that glasses will be generally needed at all times for near vision and often for finer distance correction especially for higher degrees of astigmatism.       Final visual acuity may be limited by astigmatism and diabetes    Pt wishes to have Phaco/IOL  OS     Requests a Monofocal IOL.    Will aim for distance    Other considerations: K Prec       2. Pseudophakia,Right Eye  -- Condition stable, no therapeutic change required. Monitoring routinely.     3. Diabetes mellitus type 2 without retinopathy No diabetic retinopathy at this time. Reviewed diabetic eye precautions including avoiding tobacco use,  Good glucose control, and importance of regular  follow up.      4. Dry eye  Cont at's prn ou

## 2018-12-10 NOTE — TELEPHONE ENCOUNTER
"12/10 Called and she does not answer, got recording "mailbox not set up". Cm  ----- Message from Kali Acosta MD sent at 12/9/2018 10:05 PM CST -----  holter is unremarkable    "

## 2018-12-10 NOTE — TELEPHONE ENCOUNTER
"12/10 Called and pt did not answer, got a recording "mailbox not set up". Cm  ----- Message from Kali Acosta MD sent at 12/5/2018  9:11 PM CST -----  Please tell pt stress test is nml    "

## 2018-12-11 ENCOUNTER — TELEPHONE (OUTPATIENT)
Dept: CARDIOLOGY | Facility: CLINIC | Age: 73
End: 2018-12-11

## 2018-12-11 NOTE — TELEPHONE ENCOUNTER
"12/11 Pt called and I called her back. Rev'd holter and stress test. Cm  12/11 Called and got a recording "has not set up mailbox, please try again later".cm   ----- Message from Kali Acosta MD sent at 12/9/2018 10:05 PM CST -----  holter is unremarkable    "

## 2018-12-11 NOTE — TELEPHONE ENCOUNTER
"12/11 Called and got the same message, "mailbox not set up , please try your call later". Cm  ----- Message from Kali Acosta MD sent at 12/5/2018  9:11 PM CST -----  Please tell pt stress test is nml    "

## 2018-12-12 ENCOUNTER — TELEPHONE (OUTPATIENT)
Dept: CARDIOLOGY | Facility: CLINIC | Age: 73
End: 2018-12-12

## 2018-12-12 NOTE — TELEPHONE ENCOUNTER
12/11/18 Test results rev'd. Follow up as schegiancarloled. Cm    ----- Message from Hanh Groves sent at 12/11/2018  4:46 PM CST -----  Contact: self [phone]  States that she is returning call. Please call back at [phone]//thank you acc

## 2019-01-02 ENCOUNTER — OUTSIDE PLACE OF SERVICE (OUTPATIENT)
Dept: ADMINISTRATIVE | Facility: OTHER | Age: 74
End: 2019-01-02
Payer: MEDICARE

## 2019-01-02 PROCEDURE — 66984 XCAPSL CTRC RMVL W/O ECP: CPT | Mod: LT,,, | Performed by: OPHTHALMOLOGY

## 2019-01-02 PROCEDURE — 66984 PR REMOVAL, CATARACT, W/INSRT INTRAOC LENS, W/O ENDO CYCLO: ICD-10-PCS | Mod: LT,,, | Performed by: OPHTHALMOLOGY

## 2019-01-03 ENCOUNTER — TELEPHONE (OUTPATIENT)
Dept: OPHTHALMOLOGY | Facility: CLINIC | Age: 74
End: 2019-01-03

## 2019-01-03 NOTE — TELEPHONE ENCOUNTER
Pt didn't come in for 1-day po visit.  She was unable to get a ride and lives in [place].  Advised very important to keep all po appointments.  Rescheduled to tomorrow at 8:00.

## 2019-01-04 ENCOUNTER — OFFICE VISIT (OUTPATIENT)
Dept: OPHTHALMOLOGY | Facility: CLINIC | Age: 74
End: 2019-01-04
Payer: MEDICARE

## 2019-01-04 DIAGNOSIS — Z98.890 POST-OPERATIVE STATE: Primary | ICD-10-CM

## 2019-01-04 PROCEDURE — 99024 PR POST-OP FOLLOW-UP VISIT: ICD-10-PCS | Mod: POP,,, | Performed by: OPHTHALMOLOGY

## 2019-01-04 PROCEDURE — 99999 PR PBB SHADOW E&M-EST. PATIENT-LVL II: CPT | Mod: PBBFAC,,, | Performed by: OPHTHALMOLOGY

## 2019-01-04 PROCEDURE — 99999 PR PBB SHADOW E&M-EST. PATIENT-LVL II: ICD-10-PCS | Mod: PBBFAC,,, | Performed by: OPHTHALMOLOGY

## 2019-01-04 PROCEDURE — 99024 POSTOP FOLLOW-UP VISIT: CPT | Mod: POP,,, | Performed by: OPHTHALMOLOGY

## 2019-01-04 PROCEDURE — 99212 OFFICE O/P EST SF 10 MIN: CPT | Mod: PBBFAC,PO | Performed by: OPHTHALMOLOGY

## 2019-01-04 NOTE — PROGRESS NOTES
SUBJECTIVE:   Tamara Barriga is a 73 y.o. female   Uncorrected distance visual acuity was not recorded in the right eye and 20/40 -2 in the left eye.   Chief Complaint   Patient presents with    Post-op Evaluation     2 day PO PCIOL OS        HPI:  HPI     Post-op Evaluation      Additional comments: 2 day PO PCIOL OS              Comments     2 day PO PCIOL OS  No pain, slight irritation  VA improving for distance, blurred for near    PCP: Dr. Poe    1. PCIOL OS +13.5 SN60WF (distance) 1-2-19  2. PCIOL OD +13.5 SN60WF (distance) 8-15-18  3. DM X 1980's  4.Dry Eye    OS: Gatifloxacin BID, Pred, Ketorolac QID          Last edited by Jen Cheema on 1/4/2019  8:13 AM. (History)        Assessment /Plan :  1. Post-operative state Exam:  SLE:  S/C: normal  K : trace k fold  AC: trace cell and flare  Iris: normal  Lens: PCIOL    IMP:  PO Day 2 S/P Phaco/IOL OS : Doing well.    Plan:  Continue gtts to operative eye:  Pred 1% QID  Ketorolac QID  Zymaxid BID  Reinstructed in importance of absolute compliance with Post-OP instructions including medications, shield at bedtime, and limitation of activities. Follow up appointments in approximately one and six weeks or call immediately for increased pain, redness or vision loss.

## 2019-01-14 ENCOUNTER — TELEPHONE (OUTPATIENT)
Dept: CARDIOLOGY | Facility: CLINIC | Age: 74
End: 2019-01-14

## 2019-01-14 DIAGNOSIS — I10 ESSENTIAL HYPERTENSION: Primary | ICD-10-CM

## 2019-01-15 ENCOUNTER — OFFICE VISIT (OUTPATIENT)
Dept: OPHTHALMOLOGY | Facility: CLINIC | Age: 74
End: 2019-01-15
Payer: MEDICARE

## 2019-01-15 DIAGNOSIS — Z98.890 POST-OPERATIVE STATE: Primary | ICD-10-CM

## 2019-01-15 PROCEDURE — 99024 PR POST-OP FOLLOW-UP VISIT: ICD-10-PCS | Mod: POP,,, | Performed by: OPHTHALMOLOGY

## 2019-01-15 PROCEDURE — 99212 OFFICE O/P EST SF 10 MIN: CPT | Mod: PBBFAC | Performed by: OPHTHALMOLOGY

## 2019-01-15 PROCEDURE — 99999 PR PBB SHADOW E&M-EST. PATIENT-LVL II: ICD-10-PCS | Mod: PBBFAC,,, | Performed by: OPHTHALMOLOGY

## 2019-01-15 PROCEDURE — 99999 PR PBB SHADOW E&M-EST. PATIENT-LVL II: CPT | Mod: PBBFAC,,, | Performed by: OPHTHALMOLOGY

## 2019-01-15 PROCEDURE — 99024 POSTOP FOLLOW-UP VISIT: CPT | Mod: POP,,, | Performed by: OPHTHALMOLOGY

## 2019-01-15 NOTE — PROGRESS NOTES
SUBJECTIVE:   Tamara Barriga is a 74 y.o. female   Visual acuity was not recorded.   No chief complaint on file.       HPI:      Assessment /Plan :  1. Post-operative state Slit lamp exam:  L/L: nl  K: clear, wound sealed  AC: 0 cell and flare  Iris/Lens: IOL centered and stable      IMP:  PO Week 1 S/P Phaco/ IOL OS : Doing well with no evidence of infection or abnormal inflammation.     Plan:  Pt given and instructed in one week PO instructions. D/C zymaxid and start to taper off Ketorlac and Pred Forte 1% weekly. Can resume normal activitites and d/c eye shield. OTC reading glasses can be used until evaluated for final MR. Follow up in one month or PRN pain, redness, vision loss, or other concerns.

## 2019-02-18 RX ORDER — TELMISARTAN AND HYDROCHLORTHIAZIDE 40; 12.5 MG/1; MG/1
TABLET ORAL
Qty: 7 TABLET | Refills: 0 | Status: SHIPPED | OUTPATIENT
Start: 2019-02-18 | End: 2019-04-22 | Stop reason: SDUPTHER

## 2019-02-18 RX ORDER — METFORMIN HYDROCHLORIDE 1000 MG/1
TABLET ORAL
Qty: 14 TABLET | Refills: 0 | Status: SHIPPED | OUTPATIENT
Start: 2019-02-18 | End: 2019-02-25 | Stop reason: SDUPTHER

## 2019-02-18 RX ORDER — DULOXETIN HYDROCHLORIDE 60 MG/1
CAPSULE, DELAYED RELEASE ORAL
Qty: 90 CAPSULE | Refills: 0 | Status: SHIPPED | OUTPATIENT
Start: 2019-02-18 | End: 2019-05-15 | Stop reason: SDUPTHER

## 2019-02-18 RX ORDER — DICLOFENAC SODIUM 75 MG/1
75 TABLET, DELAYED RELEASE ORAL 2 TIMES DAILY PRN
Qty: 30 TABLET | Refills: 0 | Status: SHIPPED | OUTPATIENT
Start: 2019-02-18 | End: 2019-04-13 | Stop reason: SDUPTHER

## 2019-02-19 ENCOUNTER — OFFICE VISIT (OUTPATIENT)
Dept: OPHTHALMOLOGY | Facility: CLINIC | Age: 74
End: 2019-02-19
Payer: MEDICARE

## 2019-02-19 DIAGNOSIS — Z98.890 POST-OPERATIVE STATE: Primary | ICD-10-CM

## 2019-02-19 PROCEDURE — 99024 POSTOP FOLLOW-UP VISIT: CPT | Mod: POP,,, | Performed by: OPHTHALMOLOGY

## 2019-02-19 PROCEDURE — 99024 PR POST-OP FOLLOW-UP VISIT: ICD-10-PCS | Mod: POP,,, | Performed by: OPHTHALMOLOGY

## 2019-02-19 PROCEDURE — 99212 OFFICE O/P EST SF 10 MIN: CPT | Mod: PBBFAC | Performed by: OPHTHALMOLOGY

## 2019-02-19 PROCEDURE — 99999 PR PBB SHADOW E&M-EST. PATIENT-LVL II: ICD-10-PCS | Mod: PBBFAC,,, | Performed by: OPHTHALMOLOGY

## 2019-02-19 PROCEDURE — 99999 PR PBB SHADOW E&M-EST. PATIENT-LVL II: CPT | Mod: PBBFAC,,, | Performed by: OPHTHALMOLOGY

## 2019-02-19 NOTE — PROGRESS NOTES
SUBJECTIVE:   Tamara Barriga is a 74 y.o. female   Uncorrected distance visual acuity was 20/50 -1 in the right eye and 20/40 -2 in the left eye.   Chief Complaint   Patient presents with    Post-op Evaluation     AT's prn OS        HPI:  HPI     Post-op Evaluation      Additional comments: AT's prn OS              Comments     One month post op check     Patient states that she still has a scratchy feeling in her left eye. It   started before the surgery.  Patient has noticed 2 floaters in her left eye 2 weeks ago. No flashing   lights and no veils  No eye pain    PCP: Dr. Poe    1. PCIOL OS +13.5 SN60WF (distance) 1-2-19  2. PCIOL OD +13.5 SN60WF (distance) 8-15-18  3. DM X 1980's  4.Dry Eye    OS: AT's prn          Last edited by Katlyn Epperson on 2/19/2019  9:21 AM. (History)        Assessment /Plan :  1. Post-operative state   Exam:    Slit lamp exam:  L/L: nl  S/C: quiet and uninflamed  K: clear, wound sealed  AC: no cell or flare  Iris/Lens: IOL centered and stable      Assessment:    PO Month 1 S/P Phaco/IOL OS : Doing Well and completing healing process. Final visual correction options discussed and appropriate prescriptions and/or OTC reading glass recommendations offered to patient. Pt desires PAL Rx. Pt instructed to follow up with Dr. Carias in 6 months for next eye exam or PRN any pain, redness, vision changes or other concerns.

## 2019-02-22 ENCOUNTER — TELEPHONE (OUTPATIENT)
Dept: FAMILY MEDICINE | Facility: CLINIC | Age: 74
End: 2019-02-22

## 2019-02-22 NOTE — TELEPHONE ENCOUNTER
----- Message from Rose Reilly sent at 2/22/2019 10:16 AM CST -----  Contact: SELF  Type:  Patient Requesting Referral    Who Called:Ms. Mcdonald   Does the patient already have the specialty appointment scheduled?:no  If yes, what is the date of that appointment?:  Referral to What Specialty:  Reason for Referral:cyst on back  Does the patient want the referral with a specific physician?:  Is the [facility]   Patient Requesting a Response?:yes  Would the patient rather a call back or a response via [facility]? call  Best Call Back Number:[phone]  Additional Information:

## 2019-02-25 ENCOUNTER — LAB VISIT (OUTPATIENT)
Dept: LAB | Facility: HOSPITAL | Age: 74
End: 2019-02-25
Attending: FAMILY MEDICINE
Payer: MEDICARE

## 2019-02-25 ENCOUNTER — OFFICE VISIT (OUTPATIENT)
Dept: FAMILY MEDICINE | Facility: CLINIC | Age: 74
End: 2019-02-25
Attending: FAMILY MEDICINE
Payer: MEDICARE

## 2019-02-25 VITALS
SYSTOLIC BLOOD PRESSURE: 136 MMHG | HEIGHT: 66 IN | WEIGHT: 206.44 LBS | OXYGEN SATURATION: 94 % | HEART RATE: 71 BPM | BODY MASS INDEX: 33.18 KG/M2 | DIASTOLIC BLOOD PRESSURE: 80 MMHG | TEMPERATURE: 98 F

## 2019-02-25 DIAGNOSIS — E11.69 DM TYPE 2 WITH DIABETIC DYSLIPIDEMIA: Primary | ICD-10-CM

## 2019-02-25 DIAGNOSIS — I10 HYPERTENSION, UNSPECIFIED TYPE: ICD-10-CM

## 2019-02-25 DIAGNOSIS — F51.01 PRIMARY INSOMNIA: ICD-10-CM

## 2019-02-25 DIAGNOSIS — E78.5 DM TYPE 2 WITH DIABETIC DYSLIPIDEMIA: ICD-10-CM

## 2019-02-25 DIAGNOSIS — L72.3 SEBACEOUS CYST: ICD-10-CM

## 2019-02-25 DIAGNOSIS — E11.9 DIABETES MELLITUS WITHOUT COMPLICATION: ICD-10-CM

## 2019-02-25 DIAGNOSIS — E11.69 DM TYPE 2 WITH DIABETIC DYSLIPIDEMIA: ICD-10-CM

## 2019-02-25 DIAGNOSIS — E78.5 DM TYPE 2 WITH DIABETIC DYSLIPIDEMIA: Primary | ICD-10-CM

## 2019-02-25 LAB
ALBUMIN SERPL BCP-MCNC: 3.3 G/DL
ALBUMIN/CREAT UR: 46.6 UG/MG
ALP SERPL-CCNC: 113 U/L
ALT SERPL W/O P-5'-P-CCNC: 18 U/L
ANION GAP SERPL CALC-SCNC: 12 MMOL/L
AST SERPL-CCNC: 16 U/L
BASOPHILS # BLD AUTO: 0.04 K/UL
BASOPHILS NFR BLD: 0.4 %
BILIRUB SERPL-MCNC: 0.5 MG/DL
BUN SERPL-MCNC: 14 MG/DL
CALCIUM SERPL-MCNC: 9.9 MG/DL
CHLORIDE SERPL-SCNC: 100 MMOL/L
CO2 SERPL-SCNC: 26 MMOL/L
CREAT SERPL-MCNC: 1 MG/DL
CREAT UR-MCNC: 88 MG/DL
DIFFERENTIAL METHOD: NORMAL
EOSINOPHIL # BLD AUTO: 0.1 K/UL
EOSINOPHIL NFR BLD: 1 %
ERYTHROCYTE [DISTWIDTH] IN BLOOD BY AUTOMATED COUNT: 13.2 %
EST. GFR  (AFRICAN AMERICAN): >60 ML/MIN/1.73 M^2
EST. GFR  (NON AFRICAN AMERICAN): 56.4 ML/MIN/1.73 M^2
ESTIMATED AVG GLUCOSE: 229 MG/DL
GLUCOSE SERPL-MCNC: 325 MG/DL
HBA1C MFR BLD HPLC: 9.6 %
HCT VFR BLD AUTO: 41.6 %
HGB BLD-MCNC: 13.7 G/DL
IMM GRANULOCYTES # BLD AUTO: 0.02 K/UL
IMM GRANULOCYTES NFR BLD AUTO: 0.2 %
LYMPHOCYTES # BLD AUTO: 2.6 K/UL
LYMPHOCYTES NFR BLD: 25 %
MCH RBC QN AUTO: 28.8 PG
MCHC RBC AUTO-ENTMCNC: 32.9 G/DL
MCV RBC AUTO: 88 FL
MICROALBUMIN UR DL<=1MG/L-MCNC: 41 UG/ML
MONOCYTES # BLD AUTO: 0.5 K/UL
MONOCYTES NFR BLD: 5 %
NEUTROPHILS # BLD AUTO: 7 K/UL
NEUTROPHILS NFR BLD: 68.4 %
NRBC BLD-RTO: 0 /100 WBC
PLATELET # BLD AUTO: 281 K/UL
PMV BLD AUTO: 12 FL
POTASSIUM SERPL-SCNC: 4 MMOL/L
PROT SERPL-MCNC: 6.8 G/DL
RBC # BLD AUTO: 4.75 M/UL
SODIUM SERPL-SCNC: 138 MMOL/L
WBC # BLD AUTO: 10.18 K/UL

## 2019-02-25 PROCEDURE — 99214 OFFICE O/P EST MOD 30 MIN: CPT | Mod: 25,S$PBB,, | Performed by: FAMILY MEDICINE

## 2019-02-25 PROCEDURE — G0010 PR ADMIN HEPATITIS B VACCINE: ICD-10-PCS | Mod: S$PBB,,, | Performed by: FAMILY MEDICINE

## 2019-02-25 PROCEDURE — 90739 HEPB VACC 2/4 DOSE ADULT IM: CPT | Mod: PBBFAC,PO

## 2019-02-25 PROCEDURE — G0010 ADMIN HEPATITIS B VACCINE: HCPCS | Mod: S$PBB,,, | Performed by: FAMILY MEDICINE

## 2019-02-25 PROCEDURE — 80053 COMPREHEN METABOLIC PANEL: CPT

## 2019-02-25 PROCEDURE — 85025 COMPLETE CBC W/AUTO DIFF WBC: CPT

## 2019-02-25 PROCEDURE — 90662 IIV NO PRSV INCREASED AG IM: CPT | Mod: PBBFAC,PO

## 2019-02-25 PROCEDURE — G0009 ADMIN PNEUMOCOCCAL VACCINE: HCPCS | Mod: PBBFAC,PO

## 2019-02-25 PROCEDURE — G0010 ADMIN HEPATITIS B VACCINE: HCPCS | Mod: PBBFAC,PO

## 2019-02-25 PROCEDURE — 99214 OFFICE O/P EST MOD 30 MIN: CPT | Mod: PBBFAC,PO,25 | Performed by: FAMILY MEDICINE

## 2019-02-25 PROCEDURE — 82043 UR ALBUMIN QUANTITATIVE: CPT

## 2019-02-25 PROCEDURE — 90739 HEPATITIS B (RECOMBINANT) ADJUVANTED, 2 DOSE: ICD-10-PCS | Mod: S$PBB,,, | Performed by: FAMILY MEDICINE

## 2019-02-25 PROCEDURE — 99999 PR PBB SHADOW E&M-EST. PATIENT-LVL IV: ICD-10-PCS | Mod: PBBFAC,,, | Performed by: FAMILY MEDICINE

## 2019-02-25 PROCEDURE — 99999 PR PBB SHADOW E&M-EST. PATIENT-LVL IV: CPT | Mod: PBBFAC,,, | Performed by: FAMILY MEDICINE

## 2019-02-25 PROCEDURE — 99214 PR OFFICE/OUTPT VISIT, EST, LEVL IV, 30-39 MIN: ICD-10-PCS | Mod: 25,S$PBB,, | Performed by: FAMILY MEDICINE

## 2019-02-25 PROCEDURE — 36415 COLL VENOUS BLD VENIPUNCTURE: CPT | Mod: PO

## 2019-02-25 PROCEDURE — 83036 HEMOGLOBIN GLYCOSYLATED A1C: CPT

## 2019-02-25 PROCEDURE — 90739 HEPB VACC 2/4 DOSE ADULT IM: CPT | Mod: S$PBB,,, | Performed by: FAMILY MEDICINE

## 2019-02-25 RX ORDER — SULFAMETHOXAZOLE AND TRIMETHOPRIM 800; 160 MG/1; MG/1
1 TABLET ORAL 2 TIMES DAILY
Qty: 20 TABLET | Refills: 0 | Status: SHIPPED | OUTPATIENT
Start: 2019-02-25 | End: 2019-06-18

## 2019-02-25 RX ORDER — ATENOLOL 100 MG/1
100 TABLET ORAL DAILY
Qty: 90 TABLET | Refills: 0 | Status: SHIPPED | OUTPATIENT
Start: 2019-02-25 | End: 2019-05-21 | Stop reason: SDUPTHER

## 2019-02-25 RX ORDER — ERGOCALCIFEROL 1.25 MG/1
50000 CAPSULE ORAL
Qty: 12 CAPSULE | Refills: 0 | Status: SHIPPED | OUTPATIENT
Start: 2019-02-25 | End: 2019-04-13 | Stop reason: SDUPTHER

## 2019-02-25 RX ORDER — LORAZEPAM 1 MG/1
1 TABLET ORAL NIGHTLY
Qty: 90 TABLET | Refills: 0 | Status: SHIPPED | OUTPATIENT
Start: 2019-02-25 | End: 2019-06-18 | Stop reason: SDUPTHER

## 2019-02-25 RX ORDER — METFORMIN HYDROCHLORIDE 1000 MG/1
1000 TABLET ORAL 2 TIMES DAILY WITH MEALS
Qty: 60 TABLET | Refills: 0 | Status: SHIPPED | OUTPATIENT
Start: 2019-02-25 | End: 2019-03-25 | Stop reason: SDUPTHER

## 2019-02-25 NOTE — PROGRESS NOTES
Subjective:       Patient ID: Tamara Barriga is a 74 y.o. female.    Chief Complaint: Cyst (on upper back)    74 y old here for f/u on DM, htn and dlp . Not  On aspirin, due  For last hep b , flu and pneumovax  , exercises : not much . Tries to eat healthy .  Opthalmology : current eye exam (Dr Farfan) . Checking BS  Every 2 weeks mainly before breakfast and dinner : 300. On levemir 75 u BID and Novolog : 25 units pre lunch . Lastly : cyst on  Upper back for 2 w . Painful then . Now improved .       Review of Systems   Constitutional: Negative.    HENT: Negative.    Eyes: Negative.    Respiratory: Negative.    Cardiovascular: Negative.    Gastrointestinal: Negative.    Genitourinary: Negative.    Musculoskeletal: Negative.    Skin: Negative.    Hematological: Negative.        Objective:      Physical Exam   Constitutional: She is oriented to person, place, and time. No distress.   HENT:   Head: Normocephalic and atraumatic.   Right Ear: External ear normal.   Left Ear: External ear normal.   Mouth/Throat: No oropharyngeal exudate.   Eyes: Conjunctivae and EOM are normal. Pupils are equal, round, and reactive to light. Right eye exhibits no discharge. Left eye exhibits no discharge. No scleral icterus.   Neck: Normal range of motion. Neck supple. No JVD present. No tracheal deviation present. No thyromegaly present.   Cardiovascular: Normal rate, regular rhythm and normal heart sounds. Exam reveals no gallop and no friction rub.   No murmur heard.  Pulmonary/Chest: Effort normal and breath sounds normal. No stridor. No respiratory distress. She has no wheezes. She has no rales. She exhibits no tenderness.   Abdominal: Soft. Bowel sounds are normal. She exhibits no distension. There is no tenderness. There is no rebound and no guarding.   Musculoskeletal: Normal range of motion.   Lymphadenopathy:     She has no cervical adenopathy.   Neurological: She is alert and oriented to person, place, and time.   Skin: Skin  is warm and dry. Rash noted. Rash is nodular. She is not diaphoretic. There is erythema.        5 cm in diam non fluctuant mass , non tender, edematous , erythematous    Psychiatric: She has a normal mood and affect. Her behavior is normal. Judgment and thought content normal.       Assessment:     Tamara was seen today for cyst.    Diagnoses and all orders for this visit:    DM type 2 with diabetic dyslipidemia  -     Ambulatory consult to Diabetic Education  -     (In Office Administered) Hepatitis B (Recombinant) Adjuvanted, 2 dose  -     CBC auto differential; Future  -     Comprehensive metabolic panel; Future  -     Hemoglobin A1c; Future  -     Microalbumin/creatinine urine ratio    Primary insomnia  -     LORazepam (ATIVAN) 1 MG tablet; Take 1 tablet (1 mg total) by mouth every evening.    Sebaceous cyst  -     Ambulatory consult to General Surgery    Diabetes mellitus without complication    Hypertension, unspecified type    Other orders  -     (In Office Administered) Pneumococcal Polysaccharide Vaccine (23 Valent) (SQ/IM)  -     metFORMIN (GLUCOPHAGE) 1000 MG tablet; Take 1 tablet (1,000 mg total) by mouth 2 (two) times daily with meals.  -     atenolol (TENORMIN) 100 MG tablet; Take 1 tablet (100 mg total) by mouth once daily.  -     ergocalciferol (VITAMIN D2) 50,000 unit Cap; Take 1 capsule (50,000 Units total) by mouth every 7 days.  -     sulfamethoxazole-trimethoprim 800-160mg (BACTRIM DS) 800-160 mg Tab; Take 1 tablet by mouth 2 (two) times daily.  -     flash glucose scanning reader (FREESTYLE ELVIS 10 DAY READER) Misc; Check BS 4 times daily  -     flash glucose sensor (FREESTYLE ELVIS 10 DAY SENSOR) Kit; Check BS QID  -     Influenza - High Dose (65+) (PF) (IM)      Plan:   Check BS pre meals and qhs . Start Novolog 3 Units with breakfast and dinner . Keep BSL   Stable . Med ref   TMP-SMX  Controlled.cont med

## 2019-03-04 ENCOUNTER — TELEPHONE (OUTPATIENT)
Dept: FAMILY MEDICINE | Facility: CLINIC | Age: 74
End: 2019-03-04

## 2019-03-04 RX ORDER — INSULIN ASPART 100 [IU]/ML
INJECTION, SOLUTION INTRAVENOUS; SUBCUTANEOUS
Qty: 9 BOX | Refills: 3 | Status: SHIPPED | OUTPATIENT
Start: 2019-03-04 | End: 2019-06-18 | Stop reason: SDUPTHER

## 2019-03-05 RX ORDER — INSULIN DETEMIR 100 [IU]/ML
INJECTION, SOLUTION SUBCUTANEOUS
Qty: 6 ML | Refills: 0 | Status: SHIPPED | OUTPATIENT
Start: 2019-03-05 | End: 2019-04-24 | Stop reason: SDUPTHER

## 2019-03-26 RX ORDER — METFORMIN HYDROCHLORIDE 1000 MG/1
1000 TABLET ORAL 2 TIMES DAILY WITH MEALS
Qty: 180 TABLET | Refills: 0 | Status: SHIPPED | OUTPATIENT
Start: 2019-03-26 | End: 2019-06-18 | Stop reason: SDUPTHER

## 2019-04-15 RX ORDER — ERGOCALCIFEROL 1.25 MG/1
CAPSULE ORAL
Qty: 12 CAPSULE | Refills: 0 | Status: SHIPPED | OUTPATIENT
Start: 2019-04-15 | End: 2020-03-03 | Stop reason: SDUPTHER

## 2019-04-15 RX ORDER — DICLOFENAC SODIUM 75 MG/1
75 TABLET, DELAYED RELEASE ORAL 2 TIMES DAILY PRN
Qty: 30 TABLET | Refills: 0 | Status: SHIPPED | OUTPATIENT
Start: 2019-04-15 | End: 2019-06-18

## 2019-04-22 RX ORDER — TELMISARTAN AND HYDROCHLORTHIAZIDE 40; 12.5 MG/1; MG/1
TABLET ORAL
Qty: 90 TABLET | Refills: 0 | Status: SHIPPED | OUTPATIENT
Start: 2019-04-22 | End: 2019-06-18 | Stop reason: SDUPTHER

## 2019-04-25 RX ORDER — INSULIN DETEMIR 100 [IU]/ML
INJECTION, SOLUTION SUBCUTANEOUS
Qty: 90 ML | Refills: 0 | Status: SHIPPED | OUTPATIENT
Start: 2019-04-25 | End: 2019-06-18 | Stop reason: SDUPTHER

## 2019-05-15 RX ORDER — ATORVASTATIN CALCIUM 80 MG/1
TABLET, FILM COATED ORAL
Qty: 7 TABLET | Refills: 0 | Status: SHIPPED | OUTPATIENT
Start: 2019-05-15 | End: 2019-06-18 | Stop reason: SDUPTHER

## 2019-05-15 RX ORDER — DULOXETIN HYDROCHLORIDE 60 MG/1
CAPSULE, DELAYED RELEASE ORAL
Qty: 7 CAPSULE | Refills: 0 | Status: SHIPPED | OUTPATIENT
Start: 2019-05-15 | End: 2019-06-18 | Stop reason: SDUPTHER

## 2019-05-21 RX ORDER — ATENOLOL 100 MG/1
TABLET ORAL
Qty: 90 TABLET | Refills: 0 | Status: SHIPPED | OUTPATIENT
Start: 2019-05-21 | End: 2019-06-18 | Stop reason: SDUPTHER

## 2019-05-22 ENCOUNTER — PATIENT OUTREACH (OUTPATIENT)
Dept: ADMINISTRATIVE | Facility: HOSPITAL | Age: 74
End: 2019-05-22

## 2019-06-17 ENCOUNTER — PATIENT OUTREACH (OUTPATIENT)
Dept: ADMINISTRATIVE | Facility: HOSPITAL | Age: 74
End: 2019-06-17

## 2019-06-18 ENCOUNTER — OFFICE VISIT (OUTPATIENT)
Dept: FAMILY MEDICINE | Facility: CLINIC | Age: 74
End: 2019-06-18
Attending: FAMILY MEDICINE
Payer: MEDICARE

## 2019-06-18 VITALS
SYSTOLIC BLOOD PRESSURE: 138 MMHG | WEIGHT: 204.38 LBS | OXYGEN SATURATION: 95 % | HEIGHT: 66 IN | DIASTOLIC BLOOD PRESSURE: 88 MMHG | HEART RATE: 80 BPM | BODY MASS INDEX: 32.85 KG/M2 | TEMPERATURE: 99 F

## 2019-06-18 DIAGNOSIS — E78.5 DM TYPE 2 WITH DIABETIC DYSLIPIDEMIA: Primary | ICD-10-CM

## 2019-06-18 DIAGNOSIS — I10 HYPERTENSION, UNSPECIFIED TYPE: ICD-10-CM

## 2019-06-18 DIAGNOSIS — F51.01 PRIMARY INSOMNIA: ICD-10-CM

## 2019-06-18 DIAGNOSIS — E11.69 DM TYPE 2 WITH DIABETIC DYSLIPIDEMIA: Primary | ICD-10-CM

## 2019-06-18 LAB
ALBUMIN/CREAT UR: 40 UG/MG (ref 0–30)
CREAT UR-MCNC: 155 MG/DL (ref 15–325)
MICROALBUMIN UR DL<=1MG/L-MCNC: 62 UG/ML

## 2019-06-18 PROCEDURE — 99999 PR PBB SHADOW E&M-EST. PATIENT-LVL IV: ICD-10-PCS | Mod: PBBFAC,,, | Performed by: FAMILY MEDICINE

## 2019-06-18 PROCEDURE — 82043 UR ALBUMIN QUANTITATIVE: CPT

## 2019-06-18 PROCEDURE — 90472 IMMUNIZATION ADMIN EACH ADD: CPT | Mod: PBBFAC,PO

## 2019-06-18 PROCEDURE — 99214 OFFICE O/P EST MOD 30 MIN: CPT | Mod: PBBFAC,PO | Performed by: FAMILY MEDICINE

## 2019-06-18 PROCEDURE — 99999 PR PBB SHADOW E&M-EST. PATIENT-LVL IV: CPT | Mod: PBBFAC,,, | Performed by: FAMILY MEDICINE

## 2019-06-18 PROCEDURE — 99214 PR OFFICE/OUTPT VISIT, EST, LEVL IV, 30-39 MIN: ICD-10-PCS | Mod: S$PBB,,, | Performed by: FAMILY MEDICINE

## 2019-06-18 PROCEDURE — G0010 ADMIN HEPATITIS B VACCINE: HCPCS | Mod: PBBFAC,PO

## 2019-06-18 PROCEDURE — 99214 OFFICE O/P EST MOD 30 MIN: CPT | Mod: S$PBB,,, | Performed by: FAMILY MEDICINE

## 2019-06-18 RX ORDER — GABAPENTIN 300 MG/1
300 CAPSULE ORAL 2 TIMES DAILY
Qty: 180 CAPSULE | Refills: 0 | Status: SHIPPED | OUTPATIENT
Start: 2019-06-18 | End: 2019-12-23 | Stop reason: SDUPTHER

## 2019-06-18 RX ORDER — HYDROCHLOROTHIAZIDE 12.5 MG/1
12.5 CAPSULE ORAL DAILY
Qty: 90 CAPSULE | Refills: 0 | Status: SHIPPED | OUTPATIENT
Start: 2019-06-18 | End: 2019-10-26 | Stop reason: SDUPTHER

## 2019-06-18 RX ORDER — ALENDRONATE SODIUM 70 MG/1
TABLET ORAL
Qty: 12 TABLET | Refills: 3 | Status: SHIPPED | OUTPATIENT
Start: 2019-06-18 | End: 2020-05-20

## 2019-06-18 RX ORDER — ATENOLOL 100 MG/1
100 TABLET ORAL DAILY
Qty: 90 TABLET | Refills: 0 | Status: SHIPPED | OUTPATIENT
Start: 2019-06-18 | End: 2019-10-26 | Stop reason: SDUPTHER

## 2019-06-18 RX ORDER — OMEPRAZOLE 20 MG/1
20 CAPSULE, DELAYED RELEASE ORAL DAILY
Qty: 30 CAPSULE | Refills: 11 | Status: SHIPPED | OUTPATIENT
Start: 2019-06-18 | End: 2020-05-20

## 2019-06-18 RX ORDER — DULOXETIN HYDROCHLORIDE 60 MG/1
60 CAPSULE, DELAYED RELEASE ORAL DAILY
Qty: 90 CAPSULE | Refills: 0 | Status: SHIPPED | OUTPATIENT
Start: 2019-06-18 | End: 2019-10-26 | Stop reason: SDUPTHER

## 2019-06-18 RX ORDER — ATORVASTATIN CALCIUM 80 MG/1
80 TABLET, FILM COATED ORAL DAILY
Qty: 90 TABLET | Refills: 0 | Status: SHIPPED | OUTPATIENT
Start: 2019-06-18 | End: 2019-10-26 | Stop reason: SDUPTHER

## 2019-06-18 RX ORDER — METFORMIN HYDROCHLORIDE 1000 MG/1
1000 TABLET ORAL 2 TIMES DAILY WITH MEALS
Qty: 180 TABLET | Refills: 0 | Status: SHIPPED | OUTPATIENT
Start: 2019-06-18 | End: 2019-12-23 | Stop reason: SDUPTHER

## 2019-06-18 RX ORDER — LORAZEPAM 1 MG/1
1 TABLET ORAL NIGHTLY
Qty: 90 TABLET | Refills: 0 | Status: SHIPPED | OUTPATIENT
Start: 2019-06-18 | End: 2019-11-18 | Stop reason: SDUPTHER

## 2019-06-18 RX ORDER — CEPHALEXIN 500 MG/1
500 CAPSULE ORAL 4 TIMES DAILY
Qty: 28 CAPSULE | Refills: 0 | Status: SHIPPED | OUTPATIENT
Start: 2019-06-18 | End: 2019-11-18

## 2019-06-18 RX ORDER — INSULIN ASPART 100 [IU]/ML
INJECTION, SOLUTION INTRAVENOUS; SUBCUTANEOUS
Qty: 9 BOX | Refills: 3 | Status: SHIPPED | OUTPATIENT
Start: 2019-06-18 | End: 2019-12-23 | Stop reason: SDUPTHER

## 2019-06-18 RX ORDER — TELMISARTAN AND HYDROCHLORTHIAZIDE 40; 12.5 MG/1; MG/1
1 TABLET ORAL DAILY
Qty: 90 TABLET | Refills: 0 | Status: SHIPPED | OUTPATIENT
Start: 2019-06-18 | End: 2020-01-28

## 2019-06-18 NOTE — PROGRESS NOTES
Subjective:       Patient ID: Tamara Barriga is a 74 y.o. female.    Chief Complaint: Medication Refill    74 y old here for f/u on DM, htn and dlp . Not  On aspirin, due  For last hep b ,exercises : not much . Tries to eat healthy .  Opthalmology : current eye exam (Dr Farfan) . Checking BS fastin: 170 , 127 , 135 . On levemir 75 u BID and Novolog : 25 units TID .     Review of Systems   Constitutional: Negative.    HENT: Negative.    Eyes: Negative.    Respiratory: Negative.    Cardiovascular: Negative.    Gastrointestinal: Negative.    Genitourinary: Negative.    Musculoskeletal: Negative.    Skin: Negative.    Hematological: Negative.        Objective:      Physical Exam   Constitutional: She is oriented to person, place, and time. No distress.   HENT:   Head: Normocephalic and atraumatic.   Right Ear: External ear normal.   Left Ear: External ear normal.   Mouth/Throat: No oropharyngeal exudate.   Eyes: Pupils are equal, round, and reactive to light. Conjunctivae and EOM are normal. Right eye exhibits no discharge. Left eye exhibits no discharge. No scleral icterus.   Neck: Normal range of motion. Neck supple. No JVD present. No tracheal deviation present. No thyromegaly present.   Cardiovascular: Normal rate, regular rhythm and normal heart sounds. Exam reveals no gallop and no friction rub.   No murmur heard.  Pulses:       Dorsalis pedis pulses are 2+ on the right side, and 2+ on the left side.        Posterior tibial pulses are 2+ on the right side, and 2+ on the left side.   Pulmonary/Chest: Effort normal and breath sounds normal. No stridor. No respiratory distress. She has no wheezes. She has no rales. She exhibits no tenderness.   Abdominal: Soft. Bowel sounds are normal. She exhibits no distension. There is no tenderness. There is no rebound and no guarding.   Musculoskeletal: Normal range of motion.        Right foot: There is deformity. There is normal range of motion.   Feet:   Right Foot:    Protective Sensation: 10 sites tested. 0 sites sensed.   Skin Integrity: Negative for ulcer, blister, skin breakdown, erythema, warmth, callus or dry skin.   Left Foot:   Protective Sensation: 10 sites tested. 0 sites sensed.   Skin Integrity: Negative for ulcer, blister, skin breakdown, erythema, warmth, callus or dry skin.   Lymphadenopathy:     She has no cervical adenopathy.   Neurological: She is alert and oriented to person, place, and time.   Skin: Skin is warm and dry. She is not diaphoretic.   Psychiatric: She has a normal mood and affect. Her behavior is normal. Judgment and thought content normal.       Assessment:       Tamara was seen today for medication refill.    Diagnoses and all orders for this visit:    DM type 2 with diabetic dyslipidemia  -     CBC auto differential; Future  -     Comprehensive metabolic panel; Future  -     Hemoglobin A1c; Future  -     Lipid panel; Future  -     Microalbumin/creatinine urine ratio  -     Ambulatory consult to Podiatry    Primary insomnia  -     LORazepam (ATIVAN) 1 MG tablet; Take 1 tablet (1 mg total) by mouth every evening.    Hypertension, unspecified type    Other orders  -     cephALEXin (KEFLEX) 500 MG capsule; Take 1 capsule (500 mg total) by mouth 4 (four) times daily.  -     alendronate (FOSAMAX) 70 MG tablet; 1 po weekly in AM on empty stomach. Do not consume food or lie down for at least 30 minutes afterwards.  -     atenolol (TENORMIN) 100 MG tablet; Take 1 tablet (100 mg total) by mouth once daily.  -     atorvastatin (LIPITOR) 80 MG tablet; Take 1 tablet (80 mg total) by mouth once daily.  -     DULoxetine (CYMBALTA) 60 MG capsule; Take 1 capsule (60 mg total) by mouth once daily.  -     gabapentin (NEURONTIN) 300 MG capsule; Take 1 capsule (300 mg total) by mouth 2 (two) times daily.  -     hydroCHLOROthiazide (MICROZIDE) 12.5 mg capsule; Take 1 capsule (12.5 mg total) by mouth once daily.  -     omeprazole (PRILOSEC) 20 MG capsule; Take 1  capsule (20 mg total) by mouth once daily.  -     metFORMIN (GLUCOPHAGE) 1000 MG tablet; Take 1 tablet (1,000 mg total) by mouth 2 (two) times daily with meals.  -     telmisartan-hydrochlorothiazide (MICARDIS HCT) 40-12.5 mg per tablet; Take 1 tablet by mouth once daily.  -     insulin detemir U-100 (LEVEMIR FLEXTOUCH U-100 INSULN) 100 unit/mL (3 mL) SubQ InPn pen; INJECT 75 UNITS            SUBCUTANEOUSLY TWO TIMES A DAY  -     insulin aspart U-100 (NOVOLOG FLEXPEN U-100 INSULIN) 100 unit/mL (3 mL) InPn pen; 3 units sq  before breakfast ,25 units sq  before lunch, 3 units sq  before dinner  -     (In Office Administered) Hepatitis B Vaccine (Pediatric/Adolescent) (3-Dose) (IM)  -     (In Office Administered) Hepatitis B Vaccine (Pediatric/Adolescent) (3-Dose) (IM)      Plan:   Pt. encouraged to follow a strict diabetic type diet.   Encouraged daily physical activity/exercise for at least 30mins if tolerated.   Weight control recommenced as always.   Discussed how lifestyle changes make biggest impact on diabetes control.   Continue home glucose monitoring once daily and keep log.   Counseling provided today about hypoglycemia as well as about how diabetes increases risk of cardiovascular, cerebrovascular ,peripheral vascular disease and other serious health complications. He has been instructed to call if developing any new symptoms or hypoglycemia related symptoms.   See encounter for associated orders/medications.   - Lipid panel; Future  Controlled. Cont med

## 2019-06-27 ENCOUNTER — LAB VISIT (OUTPATIENT)
Dept: LAB | Facility: HOSPITAL | Age: 74
End: 2019-06-27
Attending: FAMILY MEDICINE
Payer: MEDICARE

## 2019-06-27 DIAGNOSIS — E78.5 DM TYPE 2 WITH DIABETIC DYSLIPIDEMIA: ICD-10-CM

## 2019-06-27 DIAGNOSIS — E11.69 DM TYPE 2 WITH DIABETIC DYSLIPIDEMIA: ICD-10-CM

## 2019-06-27 LAB
ALBUMIN SERPL BCP-MCNC: 3.9 G/DL (ref 3.5–5.2)
ALP SERPL-CCNC: 92 U/L (ref 55–135)
ALT SERPL W/O P-5'-P-CCNC: 15 U/L (ref 10–44)
ANION GAP SERPL CALC-SCNC: 10 MMOL/L (ref 8–16)
AST SERPL-CCNC: 15 U/L (ref 10–40)
BASOPHILS # BLD AUTO: 0.04 K/UL (ref 0–0.2)
BASOPHILS NFR BLD: 0.3 % (ref 0–1.9)
BILIRUB SERPL-MCNC: 0.5 MG/DL (ref 0.1–1)
BUN SERPL-MCNC: 19 MG/DL (ref 8–23)
CALCIUM SERPL-MCNC: 9.9 MG/DL (ref 8.7–10.5)
CHLORIDE SERPL-SCNC: 103 MMOL/L (ref 95–110)
CHOLEST SERPL-MCNC: 196 MG/DL (ref 120–199)
CHOLEST/HDLC SERPL: 4.6 {RATIO} (ref 2–5)
CO2 SERPL-SCNC: 28 MMOL/L (ref 23–29)
CREAT SERPL-MCNC: 0.9 MG/DL (ref 0.5–1.4)
DIFFERENTIAL METHOD: NORMAL
EOSINOPHIL # BLD AUTO: 0.1 K/UL (ref 0–0.5)
EOSINOPHIL NFR BLD: 0.9 % (ref 0–8)
ERYTHROCYTE [DISTWIDTH] IN BLOOD BY AUTOMATED COUNT: 12.6 % (ref 11.5–14.5)
EST. GFR  (AFRICAN AMERICAN): >60 ML/MIN/1.73 M^2
EST. GFR  (NON AFRICAN AMERICAN): >60 ML/MIN/1.73 M^2
ESTIMATED AVG GLUCOSE: 214 MG/DL (ref 68–131)
GLUCOSE SERPL-MCNC: 270 MG/DL (ref 70–110)
HBA1C MFR BLD HPLC: 9.1 % (ref 4–5.6)
HCT VFR BLD AUTO: 42.8 % (ref 37–48.5)
HDLC SERPL-MCNC: 43 MG/DL (ref 40–75)
HDLC SERPL: 21.9 % (ref 20–50)
HGB BLD-MCNC: 14 G/DL (ref 12–16)
IMM GRANULOCYTES # BLD AUTO: 0.03 K/UL (ref 0–0.04)
IMM GRANULOCYTES NFR BLD AUTO: 0.3 % (ref 0–0.5)
LDLC SERPL CALC-MCNC: 96.2 MG/DL (ref 63–159)
LYMPHOCYTES # BLD AUTO: 3.4 K/UL (ref 1–4.8)
LYMPHOCYTES NFR BLD: 29.6 % (ref 18–48)
MCH RBC QN AUTO: 29 PG (ref 27–31)
MCHC RBC AUTO-ENTMCNC: 32.7 G/DL (ref 32–36)
MCV RBC AUTO: 89 FL (ref 82–98)
MONOCYTES # BLD AUTO: 0.5 K/UL (ref 0.3–1)
MONOCYTES NFR BLD: 4.3 % (ref 4–15)
NEUTROPHILS # BLD AUTO: 7.5 K/UL (ref 1.8–7.7)
NEUTROPHILS NFR BLD: 64.6 % (ref 38–73)
NONHDLC SERPL-MCNC: 153 MG/DL
NRBC BLD-RTO: 0 /100 WBC
PLATELET # BLD AUTO: 278 K/UL (ref 150–350)
PMV BLD AUTO: 11.7 FL (ref 9.2–12.9)
POTASSIUM SERPL-SCNC: 4.1 MMOL/L (ref 3.5–5.1)
PROT SERPL-MCNC: 7.3 G/DL (ref 6–8.4)
RBC # BLD AUTO: 4.82 M/UL (ref 4–5.4)
SODIUM SERPL-SCNC: 141 MMOL/L (ref 136–145)
TRIGL SERPL-MCNC: 284 MG/DL (ref 30–150)
WBC # BLD AUTO: 11.63 K/UL (ref 3.9–12.7)

## 2019-06-27 PROCEDURE — 80061 LIPID PANEL: CPT

## 2019-06-27 PROCEDURE — 85025 COMPLETE CBC W/AUTO DIFF WBC: CPT

## 2019-06-27 PROCEDURE — 83036 HEMOGLOBIN GLYCOSYLATED A1C: CPT

## 2019-06-27 PROCEDURE — 36415 COLL VENOUS BLD VENIPUNCTURE: CPT | Mod: PO

## 2019-06-27 PROCEDURE — 80053 COMPREHEN METABOLIC PANEL: CPT

## 2019-07-02 ENCOUNTER — OFFICE VISIT (OUTPATIENT)
Dept: PODIATRY | Facility: CLINIC | Age: 74
End: 2019-07-02
Attending: FAMILY MEDICINE
Payer: MEDICARE

## 2019-07-02 VITALS
HEART RATE: 89 BPM | WEIGHT: 198.88 LBS | HEIGHT: 66 IN | DIASTOLIC BLOOD PRESSURE: 91 MMHG | BODY MASS INDEX: 31.96 KG/M2 | SYSTOLIC BLOOD PRESSURE: 170 MMHG

## 2019-07-02 DIAGNOSIS — E11.42 DIABETIC POLYNEUROPATHY ASSOCIATED WITH TYPE 2 DIABETES MELLITUS: ICD-10-CM

## 2019-07-02 DIAGNOSIS — L60.2 ONYCHOGRYPHOSIS: ICD-10-CM

## 2019-07-02 DIAGNOSIS — L60.0 INGROWING NAIL, LEFT GREAT TOE: ICD-10-CM

## 2019-07-02 DIAGNOSIS — B35.1 ONYCHOMYCOSIS DUE TO DERMATOPHYTE: Primary | ICD-10-CM

## 2019-07-02 DIAGNOSIS — M79.675 PAIN OF LEFT GREAT TOE: ICD-10-CM

## 2019-07-02 PROCEDURE — 11730 PR REMOVAL OF NAIL PLATE: ICD-10-PCS | Mod: TA,S$PBB,, | Performed by: PODIATRIST

## 2019-07-02 PROCEDURE — 11730 AVULSION NAIL PLATE SIMPLE 1: CPT | Mod: TA,S$PBB,, | Performed by: PODIATRIST

## 2019-07-02 PROCEDURE — 99203 PR OFFICE/OUTPT VISIT, NEW, LEVL III, 30-44 MIN: ICD-10-PCS | Mod: 25,S$PBB,, | Performed by: PODIATRIST

## 2019-07-02 PROCEDURE — 99999 PR PBB SHADOW E&M-EST. PATIENT-LVL III: CPT | Mod: PBBFAC,,, | Performed by: PODIATRIST

## 2019-07-02 PROCEDURE — 99213 OFFICE O/P EST LOW 20 MIN: CPT | Mod: PBBFAC | Performed by: PODIATRIST

## 2019-07-02 PROCEDURE — 99999 PR PBB SHADOW E&M-EST. PATIENT-LVL III: ICD-10-PCS | Mod: PBBFAC,,, | Performed by: PODIATRIST

## 2019-07-02 PROCEDURE — 99203 OFFICE O/P NEW LOW 30 MIN: CPT | Mod: 25,S$PBB,, | Performed by: PODIATRIST

## 2019-07-02 PROCEDURE — 11730 AVULSION NAIL PLATE SIMPLE 1: CPT | Mod: TA,PBBFAC | Performed by: PODIATRIST

## 2019-07-02 RX ORDER — KETOCONAZOLE 20 MG/G
CREAM TOPICAL 2 TIMES DAILY
Qty: 30 G | Refills: 1 | Status: SHIPPED | OUTPATIENT
Start: 2019-07-02 | End: 2019-07-02 | Stop reason: SDUPTHER

## 2019-07-02 RX ORDER — KETOCONAZOLE 20 MG/G
CREAM TOPICAL 2 TIMES DAILY
Qty: 30 G | Refills: 1 | Status: SHIPPED | OUTPATIENT
Start: 2019-07-02 | End: 2020-03-03

## 2019-07-02 NOTE — PROGRESS NOTES
Subjective:       Patient ID: Tamara Barriga is a 74 y.o. female.    Chief Complaint: Nail Problem (possible L infected toenail. Pt states nail was draining purulent drainage and bloody. Pt taking antibiotic prescribed by PCP. No noted pain. Diabetic pt. PCP Dr Arreguin)      HPI:  Patient presents to the office this afternoon, with complaint of elongated and thickened nail plates to the left foot.  Patient states using over-the-counter topical medications which have not been helpful curative.  The patient states slight discomfort due to the nail thickening and/or elongation.  Patient is interested in the definitive medical diagnosis.  Symptoms are exacerbated with tight shoe gear.  Pains are approximately 5/10. This patient's PMD is Anne Juarez MD. The patient states that the the nail plate/nails have appeared such for the past several months to years. Denies recent trauma which could lead to the diagnoses of nail dystrophy.  Patient is an uncontrolled DMII with peripheral neuropathy.    Hemoglobin A1C   Date Value Ref Range Status   06/27/2019 9.1 (H) 4.0 - 5.6 % Final     Comment:     ADA Screening Guidelines:  5.7-6.4%  Consistent with prediabetes  >or=6.5%  Consistent with diabetes  High levels of fetal hemoglobin interfere with the HbA1C  assay. Heterozygous hemoglobin variants (HbS, HgC, etc)do  not significantly interfere with this assay.   However, presence of multiple variants may affect accuracy.     02/25/2019 9.6 (H) 4.0 - 5.6 % Final     Comment:     ADA Screening Guidelines:  5.7-6.4%  Consistent with prediabetes  >or=6.5%  Consistent with diabetes  High levels of fetal hemoglobin interfere with the HbA1C  assay. Heterozygous hemoglobin variants (HbS, HgC, etc)do  not significantly interfere with this assay.   However, presence of multiple variants may affect accuracy.     11/12/2018 10.0 (H) 4.0 - 5.6 % Final     Comment:     ADA Screening Guidelines:  5.7-6.4%  Consistent with  prediabetes  >or=6.5%  Consistent with diabetes  High levels of fetal hemoglobin interfere with the HbA1C  assay. Heterozygous hemoglobin variants (HbS, HgC, etc)do  not significantly interfere with this assay.   However, presence of multiple variants may affect accuracy.         Review of patient's allergies indicates:  No Known Allergies    Past Medical History:   Diagnosis Date    Anxiety     Cataract     Diabetes mellitus, type 2 few weeks ago     Pt says is was good    Hyperlipidemia     Hypertension     Insomnia        Family History   Problem Relation Age of Onset    Cancer Daughter         Breast Ca     Breast cancer Daughter     Breast cancer Sister        Social History     Socioeconomic History    Marital status:      Spouse name: Not on file    Number of children: Not on file    Years of education: Not on file    Highest education level: Not on file   Occupational History    Not on file   Social Needs    Financial resource strain: Not on file    Food insecurity:     Worry: Not on file     Inability: Not on file    Transportation needs:     Medical: Not on file     Non-medical: Not on file   Tobacco Use    Smoking status: Never Smoker    Smokeless tobacco: Never Used   Substance and Sexual Activity    Alcohol use: Yes     Alcohol/week: 0.0 - 0.6 oz    Drug use: No    Sexual activity: Never   Lifestyle    Physical activity:     Days per week: Not on file     Minutes per session: Not on file    Stress: Not on file   Relationships    Social connections:     Talks on phone: Not on file     Gets together: Not on file     Attends [employer] service: Not on file     Active member of club or organization: Not on file     Attends meetings of clubs or organizations: Not on file     Relationship status: Not on file   Other Topics Concern    Not on file   Social History Narrative    Not on file       Past Surgical History:   Procedure Laterality Date    BACK SURGERY      BREAST CYST  "EXCISION Bilateral     2007    CATARACT EXTRACTION W/  INTRAOCULAR LENS IMPLANT Right 08/15/2018    CATARACT EXTRACTION W/  INTRAOCULAR LENS IMPLANT Left 01/02/2019    CHOLECYSTECTOMY      HYSTERECTOMY      LIVER BIOPSY      OOPHORECTOMY      TOTAL REDUCTION MAMMOPLASTY Bilateral 2007       Review of Systems   Constitutional: Negative for chills, fatigue and fever.   HENT: Negative for hearing loss.    Eyes: Negative for photophobia and visual disturbance.   Respiratory: Negative for cough, chest tightness, shortness of breath and wheezing.    Cardiovascular: Negative for chest pain and palpitations.   Gastrointestinal: Negative for constipation, diarrhea, nausea and vomiting.   Endocrine: Negative for cold intolerance and heat intolerance.   Genitourinary: Negative for flank pain.   Musculoskeletal: Negative for neck pain and neck stiffness.   Skin: Positive for wound.   Neurological: Positive for numbness. Negative for light-headedness and headaches.   Psychiatric/Behavioral: Negative for sleep disturbance.          Objective:   BP (!) 170/91 (BP Location: Right arm, Patient Position: Sitting, BP Method: Small (Automatic))   Pulse 89   Ht 5' 6" (1.676 m)   Wt 90.2 kg (198 lb 13.7 oz)   BMI 32.10 kg/m²       LOWER EXTREMITY PHYSICAL EXAMINATION    NEUROLOGY: Protective sensation is not intact to the left and right plantar surfaces of the foot and digits, as the patient has no sensation/detection at greater than 4 distinct points of contact with 5.07 [place] Delaney monofilament. Sensation to light touch is intact on the left and right foot. Proprioception is intact, bilateral. Sensation to pin prick is reduced to absent. Vibratory sensation is diminished.    DERMATOLOGY:  Skin is supple, dry and intact. No ulcerations are noted. No hyperkeratosis or calluses are noted. No ecchymosis appreciated.  There are nail changes which are consistent with onychomycosis on the left foot. On the left foot, nail #1 " is/are thickened, is/are dystrophic, with yellow discoloration, and with malodorous subungual debris.     ORTHOPEDIC: Manual Muscle Testing is 5/5 in all planes on the left, without pains, with and without resistance. No pains to palpation of the medial or lateral ankle ligaments. No discomfort to palpation of the posterior tibial tendon, peroneal tendon, Achilles tendon or the anterior ankle tendons. Gait pattern is non-antalgic.    VASCULAR: On the left foot, the dorsalis pedis pulse is 1/4 and the posterior tibial pulse is 1/4. Capillary refill time is less than 3 seconds. Hair growth is present on the dorsum of the foot and at the digits. No rubor is present. Proximal to distal temperature is warm to warm.    Assessment:     1. Onychomycosis due to dermatophyte    2. Onychogryphosis    3. Ingrowing nail, left great toe    4. Pain of left great toe    5. Diabetic polyneuropathy associated with type 2 diabetes mellitus        Plan:     Onychomycosis due to dermatophyte  -     ketoconazole (NIZORAL) 2 % cream; Apply topically 2 (two) times daily.    Onychogryphosis  Ingrowing nail, left great toe  Pain of left great toe  A TIME-OUT was completed. Oral, written and verbal consent were obtained from patient, prior to procedure being performed as discussed below.    The left great toe was anesthetized with a total of 3cc of 2% Lidocaine w/ Epinephrine.  The area was then prepped appropriately with betadine paint. Following this, a [place] Elevator was utilized to free up the medial and lateral nail borders as well as the nail plate itself, from the surrounding soft tissues.  Next, a Platypus Nail Pulling Forcep was used to slowly free the nail from the eponychium.    Once freed from the soft tissue structures, the nail bed was visualized and no defects or lacerations were noted. No osseous exposure is noted. There is scant sanguinous drainage. No purulence is noted. A curette was then utilized to remove any and all  debris from either nail fold. Sterile Adaptic, antibiotic cream and a sterile bandage with Coban was placed on the digit.      Patient was informed of the possible complications related to nail regrowth, e.g., nail dystrophy or onychomycosis. Patient was given written and oral instructions for care including the office phone number if any questions or concerns arise.      Patient to start daily application of topical ABx. ointment with a bandage.     Patient to follow up in approx. 10 to 14 days, if needed.    Diabetic polyneuropathy associated with type 2 diabetes mellitus  I counseled the patient on his/her Diabetic Mellitus regarding today's clinical examination and objection findings. We did also discuss recent medication changes, pertinent labs and imaging evaluations and other medical consultation notes and progress notes. Greater than 50% of this visit was spent on counseling and coordination of care. Greater than 20 minutes of this appt. was spent on education about the diabetic foot, in relation to PVD and/or neuropathy, and the prevention of limb loss.     Shoe gear is inspected and wear and proper fit/type. Patient is reminded of the importance of good nutrition and blood sugar control to help prevent podiatric complications of diabetes. Patient instructed on proper foot hygeine. We discussed wearing proper shoe gear, daily foot inspections, never walking without protective shoe gear, never putting sharp instruments to feet.  Patient  will continue to monitor the areas daily, inspect feet, wear protective shoe gear when ambulatory, moisturizer to maintain skin integrity.     Patient's DMI/DMII is managed by Primary Care Provider and/or Endocrinology Advanced Practice Provider. As per the most recent glycohemoglobin level, this patient is at goal.        Future Appointments   Date Time Provider Department Center   8/6/2019 10:15 AM Kimo Aretha, OD [facility]

## 2019-07-02 NOTE — LETTER
July 2, 2019      Anne Juarez MD  [address] [facility] [address]           [facility]  [address] [place] [address]  Phone: [phone]  Fax: [phone]          Patient: Tamara Barriga   MR Number: [id number]   YOB: 1945   Date of Visit: 7/2/2019       Dear Dr. Anne Juarez:    Thank you for referring Tamara Barriga to me for evaluation. Attached you will find relevant portions of my assessment and plan of care.    If you have questions, please do not hesitate to call me. I look forward to following Tamara Barriga along with you.    Sincerely,    Rajesh Natarajan, KIMBERLI    Enclosure  CC:  No Recipients    If you would like to receive this communication electronically, please contact [email or web address] or [phone] to request more information on [email or web address] Link access.    For providers and/or their staff who would like to refer a patient to [facility], please contact us through our one-stop-shop provider referral line, [facility], at [phone].    If you feel you have received this communication in error or would no longer like to receive these types of communications, please e-mail [email or web address]

## 2019-07-11 ENCOUNTER — PATIENT OUTREACH (OUTPATIENT)
Dept: ADMINISTRATIVE | Facility: HOSPITAL | Age: 74
End: 2019-07-11

## 2019-07-12 ENCOUNTER — PES CALL (OUTPATIENT)
Dept: ADMINISTRATIVE | Facility: CLINIC | Age: 74
End: 2019-07-12

## 2019-08-16 ENCOUNTER — TELEPHONE (OUTPATIENT)
Dept: FAMILY MEDICINE | Facility: CLINIC | Age: 74
End: 2019-08-16

## 2019-08-16 NOTE — TELEPHONE ENCOUNTER
----- Message from Kate Agrawal sent at 8/16/2019  2:38 PM CDT -----  Contact: Pt  Pt is calling staff regarding a refill RX for insulin detemir U-100 (LEVEMIR FLEXTOUCH U-100 INSULN) 100 unit/mL (3 mL) SubQ InPn pen . Please send the Mail order to the address in [facility].    [facility] [place] - [address] AT [address] to [place] [id number]  [facility] [address]  Phone: [phone] Fax: [phone]    Em Barriga  [address] [place] [id number]      Pt call back [phone] Pt is on Vacation  Thanks

## 2019-09-12 RX ORDER — INSULIN DETEMIR 100 [IU]/ML
INJECTION, SOLUTION SUBCUTANEOUS
Qty: 45 ML | Refills: 0 | Status: SHIPPED | OUTPATIENT
Start: 2019-09-12 | End: 2019-11-18 | Stop reason: SDUPTHER

## 2019-09-19 ENCOUNTER — PATIENT OUTREACH (OUTPATIENT)
Dept: ADMINISTRATIVE | Facility: HOSPITAL | Age: 74
End: 2019-09-19

## 2019-09-19 NOTE — PROGRESS NOTES
DIABETES OUTREACH: Attempting to contact pt to schedule over due HM & F/U. Unable to reach patient at this time. Unable to leave voicemail.     LAST HA1c:DATE:06/27/2019     DUE DATE:09/27/2019  LAST LIPID PANEL: DATE:06/27/2019      DUE DATE:06/27/2020  LAST MICRO ALBUMIN:DATE:06/27/2019     DUE DATE:06/27/2020    LAST HA1c:9.1    F/U:DM:No appt    F/U:PCP:No Appt

## 2019-10-01 ENCOUNTER — OFFICE VISIT (OUTPATIENT)
Dept: OPHTHALMOLOGY | Facility: CLINIC | Age: 74
End: 2019-10-01
Payer: MEDICARE

## 2019-10-01 DIAGNOSIS — H04.129 DRY EYE: ICD-10-CM

## 2019-10-01 DIAGNOSIS — E11.9 DIABETES MELLITUS TYPE 2 WITHOUT RETINOPATHY: Primary | ICD-10-CM

## 2019-10-01 DIAGNOSIS — Z96.1 PSEUDOPHAKIA OF BOTH EYES: ICD-10-CM

## 2019-10-01 DIAGNOSIS — I10 ESSENTIAL HYPERTENSION: ICD-10-CM

## 2019-10-01 DIAGNOSIS — H52.4 BILATERAL PRESBYOPIA: ICD-10-CM

## 2019-10-01 PROCEDURE — 92015 PR REFRACTION: ICD-10-PCS | Mod: ,,, | Performed by: OPTOMETRIST

## 2019-10-01 PROCEDURE — 92014 PR EYE EXAM, EST PATIENT,COMPREHESV: ICD-10-PCS | Mod: S$PBB,,, | Performed by: OPTOMETRIST

## 2019-10-01 PROCEDURE — 92014 COMPRE OPH EXAM EST PT 1/>: CPT | Mod: S$PBB,,, | Performed by: OPTOMETRIST

## 2019-10-01 PROCEDURE — 99212 OFFICE O/P EST SF 10 MIN: CPT | Mod: PBBFAC | Performed by: OPTOMETRIST

## 2019-10-01 PROCEDURE — 92015 DETERMINE REFRACTIVE STATE: CPT | Mod: ,,, | Performed by: OPTOMETRIST

## 2019-10-01 PROCEDURE — 99999 PR PBB SHADOW E&M-EST. PATIENT-LVL II: ICD-10-PCS | Mod: PBBFAC,,, | Performed by: OPTOMETRIST

## 2019-10-01 PROCEDURE — 99999 PR PBB SHADOW E&M-EST. PATIENT-LVL II: CPT | Mod: PBBFAC,,, | Performed by: OPTOMETRIST

## 2019-10-01 NOTE — PROGRESS NOTES
HPI     NIDDM exam.  6 months S/P cataract SX.  Dry eyes a lot left more than right eye.  Decrease near visual acuity, hard to see small print.  Last eye exam 06/07/2018 [facility].  Last eye visit 02/19/2019 [facility].  Last A1c 9.1 06/27/2019.    Last edited by Airam Mcclure on 10/1/2019 10:24 AM. (History)            Assessment /Plan     For exam results, see Encounter Report.    Diabetes mellitus type 2 without retinopathy    Dry eye    Pseudophakia of both eyes    Essential hypertension    Bilateral presbyopia      No Background Diabetic Retinopathy  One heme near ONH OS    AT prn OS>OD    Stable IOL OU.    No HTN Retinopathy    Dispense Final Rx for glasses.  RTC 6 months DFE  Discussed above and answered questions.

## 2019-10-02 ENCOUNTER — PES CALL (OUTPATIENT)
Dept: ADMINISTRATIVE | Facility: CLINIC | Age: 74
End: 2019-10-02

## 2019-10-18 DIAGNOSIS — E11.9 TYPE 2 DIABETES MELLITUS WITHOUT COMPLICATION: ICD-10-CM

## 2019-10-28 RX ORDER — ATORVASTATIN CALCIUM 80 MG/1
TABLET, FILM COATED ORAL
Qty: 90 TABLET | Refills: 0 | Status: SHIPPED | OUTPATIENT
Start: 2019-10-28 | End: 2020-01-28

## 2019-10-28 RX ORDER — HYDROCHLOROTHIAZIDE 12.5 MG/1
CAPSULE ORAL
Qty: 90 CAPSULE | Refills: 0 | Status: SHIPPED | OUTPATIENT
Start: 2019-10-28 | End: 2019-12-23 | Stop reason: SDUPTHER

## 2019-10-28 RX ORDER — ATENOLOL 100 MG/1
TABLET ORAL
Qty: 90 TABLET | Refills: 0 | Status: SHIPPED | OUTPATIENT
Start: 2019-10-28 | End: 2020-01-28

## 2019-10-28 RX ORDER — DULOXETIN HYDROCHLORIDE 60 MG/1
CAPSULE, DELAYED RELEASE ORAL
Qty: 90 CAPSULE | Refills: 0 | Status: SHIPPED | OUTPATIENT
Start: 2019-10-28 | End: 2020-01-28

## 2019-10-29 RX ORDER — INSULIN DETEMIR 100 [IU]/ML
INJECTION, SOLUTION SUBCUTANEOUS
Qty: 90 ML | Refills: 0 | OUTPATIENT
Start: 2019-10-29

## 2019-10-31 RX ORDER — INSULIN ASPART 100 [IU]/ML
INJECTION, SOLUTION INTRAVENOUS; SUBCUTANEOUS
Qty: 9 BOX | Refills: 3 | OUTPATIENT
Start: 2019-10-31

## 2019-10-31 NOTE — TELEPHONE ENCOUNTER
----- Message from Jodie Campos sent at 10/31/2019  4:41 PM CDT -----  Contact: PT   Type:  RX Refill Request    Who Called: Pt   Refill or New Rx: Refill  RX Name and Strength: insulin   How is the patient currently taking it? (ex. 1XDay):as needed  Is this a 30 day or 90 day RX:90 day supply   Preferred Pharmacy with phone number:  [facility] - [place], [place] - [address] AT [address] to [facility]  [address]  [facility] [address]  Phone: [phone] Fax: [phone]  Local or Mail Order:mail order   Ordering Provider:leonel  Would the patient rather a call back or a response via [facility]? Call back   Best Call Back Number: [phone] (home)   Additional Information: The patient is stating that only has a Rx for  one insulin but she needs both insulins to take together and its been over a week since she has had her medication,please advise.

## 2019-11-01 NOTE — TELEPHONE ENCOUNTER
----- Message from Leonarda Syed sent at 11/1/2019  3:13 PM CDT -----  Contact: pt  Patient stated that she needs her diabetic supplies sent over to mail order. She is also asking for a 3 month supply and can be reached at [phone]    [facility] [place] [place] - [address] AT [address] to [place] [address]  [facility] [address]  Phone: [phone] Fax: [phone]      Thanks,  Leonarda Syed

## 2019-11-04 RX ORDER — LANCETS
1 EACH MISCELLANEOUS 4 TIMES DAILY
Qty: 100 EACH | Refills: 2 | OUTPATIENT
Start: 2019-11-04

## 2019-11-05 ENCOUNTER — PATIENT OUTREACH (OUTPATIENT)
Dept: ADMINISTRATIVE | Facility: HOSPITAL | Age: 74
End: 2019-11-05

## 2019-11-05 NOTE — PROGRESS NOTES
DIABETES OUTREACH Attempting to contact pt to schedule over due HM & F/U. Unable to reach patient at this time. Left voicemail.

## 2019-11-06 DIAGNOSIS — F51.01 PRIMARY INSOMNIA: ICD-10-CM

## 2019-11-06 RX ORDER — DICLOFENAC SODIUM 75 MG/1
TABLET, DELAYED RELEASE ORAL
Qty: 30 TABLET | Refills: 0 | Status: SHIPPED | OUTPATIENT
Start: 2019-11-06 | End: 2020-03-03

## 2019-11-06 RX ORDER — LORAZEPAM 1 MG/1
TABLET ORAL
Qty: 90 TABLET | OUTPATIENT
Start: 2019-11-06

## 2019-11-07 DIAGNOSIS — F51.01 PRIMARY INSOMNIA: ICD-10-CM

## 2019-11-07 RX ORDER — LORAZEPAM 1 MG/1
TABLET ORAL
Qty: 90 TABLET | OUTPATIENT
Start: 2019-11-07

## 2019-11-12 DIAGNOSIS — F51.01 PRIMARY INSOMNIA: ICD-10-CM

## 2019-11-12 RX ORDER — LORAZEPAM 1 MG/1
1 TABLET ORAL NIGHTLY
Qty: 90 TABLET | Refills: 0 | OUTPATIENT
Start: 2019-11-12

## 2019-11-12 NOTE — TELEPHONE ENCOUNTER
----- Message from Migdalia Estrella sent at 11/12/2019 10:18 AM CST -----  Contact: Doug martinez / [facility]  Type:  RX Refill Request    Who Called: DOUG  Refill or New Rx:REFILL  RX Name and Strength:ATIVAN 1 MG  How is the patient currently taking it? (ex. 1XDay):1PILL NIGHTLY  Is this a 30 day or 90 day RX:30  Preferred Pharmacy with phone number:  [facility] - [place], [place] - [address] AT [facility] to [facility]  [address]  [facility] [address]  Phone: [phone] Fax: [phone]    [facility]/pharmacy #[id number] - [place] - [address] [address]  Phone: [phone] Fax: [phone]    Local or Mail Order:LOCAL  Ordering Provider:HARRY  Would the patient rather a call back or a response via [facility]? CALL  Best Call Back Number:[phone]  Additional Information: PLEASE CALL

## 2019-11-15 DIAGNOSIS — F51.01 PRIMARY INSOMNIA: ICD-10-CM

## 2019-11-15 RX ORDER — METFORMIN HYDROCHLORIDE 1000 MG/1
TABLET ORAL
Qty: 180 TABLET | Refills: 0 | OUTPATIENT
Start: 2019-11-15

## 2019-11-18 ENCOUNTER — OFFICE VISIT (OUTPATIENT)
Dept: FAMILY MEDICINE | Facility: CLINIC | Age: 74
End: 2019-11-18
Payer: MEDICARE

## 2019-11-18 ENCOUNTER — LAB VISIT (OUTPATIENT)
Dept: LAB | Facility: HOSPITAL | Age: 74
End: 2019-11-18
Attending: NURSE PRACTITIONER
Payer: MEDICARE

## 2019-11-18 VITALS
HEIGHT: 66 IN | DIASTOLIC BLOOD PRESSURE: 84 MMHG | WEIGHT: 214.94 LBS | BODY MASS INDEX: 34.55 KG/M2 | HEART RATE: 86 BPM | OXYGEN SATURATION: 95 % | TEMPERATURE: 98 F | SYSTOLIC BLOOD PRESSURE: 160 MMHG

## 2019-11-18 DIAGNOSIS — E11.69 DM TYPE 2 WITH DIABETIC DYSLIPIDEMIA: ICD-10-CM

## 2019-11-18 DIAGNOSIS — F32.5 MAJOR DEPRESSIVE DISORDER WITH SINGLE EPISODE, IN FULL REMISSION: ICD-10-CM

## 2019-11-18 DIAGNOSIS — E11.42 DIABETIC POLYNEUROPATHY ASSOCIATED WITH TYPE 2 DIABETES MELLITUS: ICD-10-CM

## 2019-11-18 DIAGNOSIS — F51.01 PRIMARY INSOMNIA: ICD-10-CM

## 2019-11-18 DIAGNOSIS — I10 ESSENTIAL HYPERTENSION: ICD-10-CM

## 2019-11-18 DIAGNOSIS — E11.69 DM TYPE 2 WITH DIABETIC DYSLIPIDEMIA: Primary | ICD-10-CM

## 2019-11-18 DIAGNOSIS — E11.65 TYPE 2 DIABETES MELLITUS WITH HYPERGLYCEMIA, WITH LONG-TERM CURRENT USE OF INSULIN: ICD-10-CM

## 2019-11-18 DIAGNOSIS — E78.5 DM TYPE 2 WITH DIABETIC DYSLIPIDEMIA: Primary | ICD-10-CM

## 2019-11-18 DIAGNOSIS — Z12.31 ENCOUNTER FOR SCREENING MAMMOGRAM FOR MALIGNANT NEOPLASM OF BREAST: ICD-10-CM

## 2019-11-18 DIAGNOSIS — Z79.4 TYPE 2 DIABETES MELLITUS WITH HYPERGLYCEMIA, WITH LONG-TERM CURRENT USE OF INSULIN: ICD-10-CM

## 2019-11-18 DIAGNOSIS — E78.5 DM TYPE 2 WITH DIABETIC DYSLIPIDEMIA: ICD-10-CM

## 2019-11-18 LAB
ALBUMIN SERPL BCP-MCNC: 3.6 G/DL (ref 3.5–5.2)
ALBUMIN/CREAT UR: 36.5 UG/MG (ref 0–30)
ALP SERPL-CCNC: 101 U/L (ref 55–135)
ALT SERPL W/O P-5'-P-CCNC: 23 U/L (ref 10–44)
ANION GAP SERPL CALC-SCNC: 10 MMOL/L (ref 8–16)
AST SERPL-CCNC: 21 U/L (ref 10–40)
BILIRUB SERPL-MCNC: 0.6 MG/DL (ref 0.1–1)
BUN SERPL-MCNC: 19 MG/DL (ref 8–23)
CALCIUM SERPL-MCNC: 9.2 MG/DL (ref 8.7–10.5)
CHLORIDE SERPL-SCNC: 102 MMOL/L (ref 95–110)
CO2 SERPL-SCNC: 27 MMOL/L (ref 23–29)
CREAT SERPL-MCNC: 1 MG/DL (ref 0.5–1.4)
CREAT UR-MCNC: 137 MG/DL (ref 15–325)
EST. GFR  (AFRICAN AMERICAN): >60 ML/MIN/1.73 M^2
EST. GFR  (NON AFRICAN AMERICAN): 56.4 ML/MIN/1.73 M^2
GLUCOSE SERPL-MCNC: 250 MG/DL (ref 70–110)
GLUCOSE SERPL-MCNC: 266 MG/DL (ref 70–110)
MICROALBUMIN UR DL<=1MG/L-MCNC: 50 UG/ML
POTASSIUM SERPL-SCNC: 4.1 MMOL/L (ref 3.5–5.1)
PROT SERPL-MCNC: 6.6 G/DL (ref 6–8.4)
SODIUM SERPL-SCNC: 139 MMOL/L (ref 136–145)

## 2019-11-18 PROCEDURE — 82962 GLUCOSE BLOOD TEST: CPT | Mod: PBBFAC,PO | Performed by: NURSE PRACTITIONER

## 2019-11-18 PROCEDURE — 82043 UR ALBUMIN QUANTITATIVE: CPT

## 2019-11-18 PROCEDURE — 99999 PR PBB SHADOW E&M-EST. PATIENT-LVL V: CPT | Mod: PBBFAC,,, | Performed by: NURSE PRACTITIONER

## 2019-11-18 PROCEDURE — 99214 PR OFFICE/OUTPT VISIT, EST, LEVL IV, 30-39 MIN: ICD-10-PCS | Mod: S$PBB,,, | Performed by: NURSE PRACTITIONER

## 2019-11-18 PROCEDURE — 36415 COLL VENOUS BLD VENIPUNCTURE: CPT | Mod: PO

## 2019-11-18 PROCEDURE — 99999 PR PBB SHADOW E&M-EST. PATIENT-LVL V: ICD-10-PCS | Mod: PBBFAC,,, | Performed by: NURSE PRACTITIONER

## 2019-11-18 PROCEDURE — 83036 HEMOGLOBIN GLYCOSYLATED A1C: CPT

## 2019-11-18 PROCEDURE — 99214 OFFICE O/P EST MOD 30 MIN: CPT | Mod: S$PBB,,, | Performed by: NURSE PRACTITIONER

## 2019-11-18 PROCEDURE — 90662 IIV NO PRSV INCREASED AG IM: CPT | Mod: PBBFAC,PO

## 2019-11-18 PROCEDURE — 80053 COMPREHEN METABOLIC PANEL: CPT

## 2019-11-18 PROCEDURE — 99215 OFFICE O/P EST HI 40 MIN: CPT | Mod: PBBFAC,PO,25 | Performed by: NURSE PRACTITIONER

## 2019-11-18 RX ORDER — LORAZEPAM 1 MG/1
TABLET ORAL
Qty: 90 TABLET | Refills: 0 | OUTPATIENT
Start: 2019-11-18

## 2019-11-18 RX ORDER — LORAZEPAM 1 MG/1
1 TABLET ORAL NIGHTLY
Qty: 90 TABLET | Refills: 0 | Status: SHIPPED | OUTPATIENT
Start: 2019-11-18 | End: 2020-03-25 | Stop reason: SDUPTHER

## 2019-11-18 RX ORDER — AMLODIPINE BESYLATE 5 MG/1
5 TABLET ORAL DAILY
Qty: 90 TABLET | Refills: 0 | Status: SHIPPED | OUTPATIENT
Start: 2019-11-18 | End: 2019-11-26 | Stop reason: SDUPTHER

## 2019-11-18 NOTE — PROGRESS NOTES
"Subjective:       Patient ID: Tamara Barriga is a 74 y.o. female.    Chief Complaint: Medication Refill  Pt reports to clinic for chronic care visit.  PMH of DM2, HTN, obesity, insomnia. Pt is noncompliant with DM diet. No exercise regimen.  Last A1C=9.1; does not monitor glucose.  Pt reports she lives with sister, "who eats badly".  Denies increase in urination.  DM eye exam current. Needs refill on ativan. Denies oversedation or falls  Diabetes   She presents for her follow-up diabetic visit. She has type 2 diabetes mellitus. Her disease course has been worsening. Hypoglycemia symptoms include nervousness/anxiousness. Pertinent negatives for diabetes include no foot paresthesias, no foot ulcerations and no weakness. There are no hypoglycemic complications. Diabetic complications include heart disease. Risk factors for coronary artery disease include diabetes mellitus, dyslipidemia, obesity, hypertension, post-menopausal and sedentary lifestyle. Current diabetic treatment includes intensive insulin program and oral agent (monotherapy). She is compliant with treatment all of the time. Her weight is increasing steadily. She is following a generally unhealthy diet. When asked about meal planning, she reported none. She has not had a previous visit with a dietitian. She never participates in exercise. She does not see a podiatrist.Eye exam is current.     Review of Systems   Constitutional: Negative.    HENT: Negative.    Respiratory: Negative.    Cardiovascular: Negative.    Gastrointestinal: Negative.    Genitourinary: Negative.    Musculoskeletal: Negative.    Neurological: Negative for weakness.   Psychiatric/Behavioral: Positive for sleep disturbance. The patient is nervous/anxious.        Objective:      Physical Exam   Constitutional: She is oriented to person, place, and time. She appears well-developed and well-nourished.   HENT:   Head: Normocephalic.   Eyes: EOM are normal.   Neck: Neck supple. "   Cardiovascular: Normal rate and normal heart sounds.   Pulmonary/Chest: Effort normal and breath sounds normal.   Abdominal: Soft. Bowel sounds are normal. There is no tenderness.   Musculoskeletal: Normal range of motion.   Neurological: She is alert and oriented to person, place, and time.   Skin: Skin is warm and dry.   Psychiatric: She has a normal mood and affect.   Vitals reviewed.      Assessment:       1. DM type 2 with diabetic dyslipidemia    2. Essential hypertension    3. Type 2 diabetes mellitus with hyperglycemia, with long-term current use of insulin    4. Diabetic polyneuropathy associated with type 2 diabetes mellitus    5. Major depressive disorder with single episode, in full remission    6. Encounter for screening mammogram for malignant neoplasm of breast     7. Primary insomnia        Plan:   DM type 2 with diabetic dyslipidemia  -     Hemoglobin A1c; Future; Expected date: 11/18/2019  -     Comprehensive metabolic panel; Future; Expected date: 11/18/2019  -     MICROALBUMIN / CREATININE RATIO URINE  -     Ambulatory consult to Diabetic Education; Future; Expected date: 11/18/2019  -     POCT Glucose, Hand-Held Device  Uncontrolled. Will add invokana.  Continue insulin, needs to see DM management, but patient is leaving for [place].    -monitor glucose FBS and PP, email readings in 2 weeks. Pt will return to [place] for follow up in 1 month  Essential hypertension  -     amLODIPine (NORVASC) 5 MG tablet; Take 1 tablet (5 mg total) by mouth once daily.  Dispense: 90 tablet; Refill: 0  Uncontrolled. Follow up scheduled in 1 month  Type 2 diabetes mellitus with hyperglycemia, with long-term current use of insulin  uncontrolled  Diabetic polyneuropathy associated with type 2 diabetes mellitus  stable  Major depressive disorder with single episode, in full remission  Stable continue treatment plan  Encounter for screening mammogram for malignant neoplasm of breast   -     Mammo Digital Screening  Bilateral With CAD; Future; Expected date: 11/18/2019    Primary insomnia  -     LORazepam (ATIVAN) 1 MG tablet; Take 1 tablet (1 mg total) by mouth every evening.  Dispense: 90 tablet; Refill: 0  Safety precautions discussed. Verbalized understanding  Other orders  -     canagliflozin (INVOKANA) 100 mg Tab; Take 1 tablet (100 mg total) by mouth once daily.  Dispense: 90 tablet; Refill: 0  -     insulin detemir U-100 (LEVEMIR FLEXTOUCH U-100 INSULN) 100 unit/mL (3 mL) SubQ InPn pen; INJECT 75 UNITS UNDER THE SKIN TWICE DAILY  Dispense: 45 mL; Refill: 0  -     Influenza - High Dose (65+) (PF) (IM)      Follow up pending lab results

## 2019-11-19 LAB
ESTIMATED AVG GLUCOSE: 252 MG/DL (ref 68–131)
HBA1C MFR BLD HPLC: 10.4 % (ref 4–5.6)

## 2019-11-26 DIAGNOSIS — I10 ESSENTIAL HYPERTENSION: ICD-10-CM

## 2019-11-26 RX ORDER — AMLODIPINE BESYLATE 5 MG/1
5 TABLET ORAL DAILY
Qty: 90 TABLET | Refills: 0 | Status: SHIPPED | OUTPATIENT
Start: 2019-11-26 | End: 2019-12-23 | Stop reason: SDUPTHER

## 2019-11-27 ENCOUNTER — TELEPHONE (OUTPATIENT)
Dept: FAMILY MEDICINE | Facility: CLINIC | Age: 74
End: 2019-11-27

## 2019-11-27 NOTE — TELEPHONE ENCOUNTER
----- Message from Laura Branonn sent at 11/27/2019  9:24 AM CST -----  Contact: [employer] - [place]  Stated she calling at pt medication, she can be reached at [phone] Ref# [id number] Thanks

## 2019-12-05 ENCOUNTER — TELEPHONE (OUTPATIENT)
Dept: FAMILY MEDICINE | Facility: CLINIC | Age: 74
End: 2019-12-05

## 2019-12-05 NOTE — TELEPHONE ENCOUNTER
----- Message from Nolberto Quinteros sent at 12/5/2019 10:49 AM CST -----  Contact: Cover My Meds  Caller is calling in regards to following up on medication for the pt. Caller can be reached at [phone] Key: [id number] . The plan faxed back a letter because question number four was not filled out.

## 2019-12-23 ENCOUNTER — OFFICE VISIT (OUTPATIENT)
Dept: FAMILY MEDICINE | Facility: CLINIC | Age: 74
End: 2019-12-23
Attending: FAMILY MEDICINE
Payer: MEDICARE

## 2019-12-23 VITALS
HEIGHT: 66 IN | SYSTOLIC BLOOD PRESSURE: 132 MMHG | TEMPERATURE: 98 F | HEART RATE: 93 BPM | DIASTOLIC BLOOD PRESSURE: 84 MMHG | BODY MASS INDEX: 33.31 KG/M2 | OXYGEN SATURATION: 96 % | WEIGHT: 207.25 LBS

## 2019-12-23 DIAGNOSIS — I10 ESSENTIAL HYPERTENSION: ICD-10-CM

## 2019-12-23 DIAGNOSIS — E11.69 DM TYPE 2 WITH DIABETIC DYSLIPIDEMIA: ICD-10-CM

## 2019-12-23 DIAGNOSIS — N18.30 CKD (CHRONIC KIDNEY DISEASE) STAGE 3, GFR 30-59 ML/MIN: ICD-10-CM

## 2019-12-23 DIAGNOSIS — N39.41 URGE INCONTINENCE: ICD-10-CM

## 2019-12-23 DIAGNOSIS — E78.5 DM TYPE 2 WITH DIABETIC DYSLIPIDEMIA: ICD-10-CM

## 2019-12-23 DIAGNOSIS — M51.36 DDD (DEGENERATIVE DISC DISEASE), LUMBAR: Primary | ICD-10-CM

## 2019-12-23 PROCEDURE — 1159F MED LIST DOCD IN RCRD: CPT | Mod: ,,, | Performed by: FAMILY MEDICINE

## 2019-12-23 PROCEDURE — 99999 PR PBB SHADOW E&M-EST. PATIENT-LVL IV: ICD-10-PCS | Mod: PBBFAC,,, | Performed by: FAMILY MEDICINE

## 2019-12-23 PROCEDURE — 1125F PR PAIN SEVERITY QUANTIFIED, PAIN PRESENT: ICD-10-PCS | Mod: ,,, | Performed by: FAMILY MEDICINE

## 2019-12-23 PROCEDURE — 1125F AMNT PAIN NOTED PAIN PRSNT: CPT | Mod: ,,, | Performed by: FAMILY MEDICINE

## 2019-12-23 PROCEDURE — 99999 PR PBB SHADOW E&M-EST. PATIENT-LVL IV: CPT | Mod: PBBFAC,,, | Performed by: FAMILY MEDICINE

## 2019-12-23 PROCEDURE — 99214 OFFICE O/P EST MOD 30 MIN: CPT | Mod: S$PBB,,, | Performed by: FAMILY MEDICINE

## 2019-12-23 PROCEDURE — 99214 OFFICE O/P EST MOD 30 MIN: CPT | Mod: PBBFAC,PO | Performed by: FAMILY MEDICINE

## 2019-12-23 PROCEDURE — 1159F PR MEDICATION LIST DOCUMENTED IN MEDICAL RECORD: ICD-10-PCS | Mod: ,,, | Performed by: FAMILY MEDICINE

## 2019-12-23 PROCEDURE — 99214 PR OFFICE/OUTPT VISIT, EST, LEVL IV, 30-39 MIN: ICD-10-PCS | Mod: S$PBB,,, | Performed by: FAMILY MEDICINE

## 2019-12-23 RX ORDER — GABAPENTIN 300 MG/1
300 CAPSULE ORAL 2 TIMES DAILY
Qty: 180 CAPSULE | Refills: 0 | Status: SHIPPED | OUTPATIENT
Start: 2019-12-23 | End: 2020-03-16

## 2019-12-23 RX ORDER — METFORMIN HYDROCHLORIDE 1000 MG/1
1000 TABLET ORAL 2 TIMES DAILY WITH MEALS
Qty: 180 TABLET | Refills: 0 | Status: SHIPPED | OUTPATIENT
Start: 2019-12-23 | End: 2020-03-16

## 2019-12-23 RX ORDER — AMLODIPINE BESYLATE 5 MG/1
5 TABLET ORAL DAILY
Qty: 90 TABLET | Refills: 0 | Status: SHIPPED | OUTPATIENT
Start: 2019-12-23 | End: 2020-02-14

## 2019-12-23 RX ORDER — INSULIN ASPART 100 [IU]/ML
INJECTION, SOLUTION INTRAVENOUS; SUBCUTANEOUS
Qty: 9 BOX | Refills: 3 | Status: SHIPPED | OUTPATIENT
Start: 2019-12-23 | End: 2020-02-14 | Stop reason: SDUPTHER

## 2019-12-23 RX ORDER — HYDROCHLOROTHIAZIDE 12.5 MG/1
12.5 CAPSULE ORAL DAILY
Qty: 90 CAPSULE | Refills: 0 | Status: SHIPPED | OUTPATIENT
Start: 2019-12-23 | End: 2020-05-20 | Stop reason: SDUPTHER

## 2019-12-23 NOTE — PROGRESS NOTES
Subjective:       Patient ID: Tamara Barriga is a 74 y.o. female.    Chief Complaint: Follow-up    74y old female  here for f/u on t2 DM, htn and dlp . She has not started Victoza . Afraid of low BS . Started amlodipine 2 d ago . She has been eating healthier .   Ophthalmology ; sees Dina Javed bs: 168. Also with lower back pain for months . She has had several lumbar surgeries. Taking NSAID on a regular basis      Review of Systems   Constitutional: Negative.    HENT: Negative.    Eyes: Negative.    Respiratory: Negative.    Cardiovascular: Negative.    Gastrointestinal: Negative.    Genitourinary: Negative.    Musculoskeletal: Positive for arthralgias.   Skin: Negative.    Hematological: Negative.        Objective:      Physical Exam   Constitutional: She is oriented to person, place, and time. No distress.   HENT:   Head: Normocephalic and atraumatic.   Right Ear: External ear normal.   Left Ear: External ear normal.   Mouth/Throat: No oropharyngeal exudate.   Eyes: Pupils are equal, round, and reactive to light. Conjunctivae and EOM are normal. Right eye exhibits no discharge. Left eye exhibits no discharge. No scleral icterus.   Neck: Normal range of motion. Neck supple. No JVD present. No tracheal deviation present. No thyromegaly present.   Cardiovascular: Normal rate, regular rhythm and normal heart sounds. Exam reveals no gallop and no friction rub.   No murmur heard.  Pulmonary/Chest: Effort normal and breath sounds normal. No stridor. No respiratory distress. She has no wheezes. She has no rales. She exhibits no tenderness.   Abdominal: Soft. Bowel sounds are normal. She exhibits no distension. There is no tenderness. There is no rebound and no guarding.   Musculoskeletal: Normal range of motion.   Lymphadenopathy:     She has no cervical adenopathy.   Neurological: She is alert and oriented to person, place, and time.   Skin: Skin is warm and dry. She is not diaphoretic.   Psychiatric: She  has a normal mood and affect. Her behavior is normal. Judgment and thought content normal.       Assessment:      Tamara was seen today for follow-up.    Diagnoses and all orders for this visit:    DDD (degenerative disc disease), lumbar  -     Ambulatory consult to Pain Clinic    Urge incontinence  -     Ambulatory consult to Urology    Essential hypertension  -     amLODIPine (NORVASC) 5 MG tablet; Take 1 tablet (5 mg total) by mouth once daily.    DM type 2 with diabetic dyslipidemia    CKD (chronic kidney disease) stage 3, GFR 30-59 ml/min    Other orders  -     hydroCHLOROthiazide (MICROZIDE) 12.5 mg capsule; Take 1 capsule (12.5 mg total) by mouth once daily.  -     metFORMIN (GLUCOPHAGE) 1000 MG tablet; Take 1 tablet (1,000 mg total) by mouth 2 (two) times daily with meals.  -     gabapentin (NEURONTIN) 300 MG capsule; Take 1 capsule (300 mg total) by mouth 2 (two) times daily.  -     insulin aspart U-100 (NOVOLOG FLEXPEN U-100 INSULIN) 100 unit/mL (3 mL) InPn pen; 3 units sq  before breakfast ,25 units sq  before lunch, 3 units sq  before dinner  -     insulin detemir U-100 (LEVEMIR FLEXTOUCH U-100 INSULN) 100 unit/mL (3 mL) SubQ InPn pen; INJECT 75 UNITS UNDER THE SKIN TWICE DAILY      Plan:   PM   Urology   Controlled. Cont med   Start VICTOZA .Start documenting pre meal BS and 2 hr pp . [place] in 1 m   Avoid NSAIDS

## 2019-12-24 NOTE — TELEPHONE ENCOUNTER
Patients insurance requires a minimum days supply of 22 units pf Levemir for mail order. Please send in new prescription with updated quantity.

## 2020-01-02 ENCOUNTER — HOSPITAL ENCOUNTER (OUTPATIENT)
Dept: RADIOLOGY | Facility: HOSPITAL | Age: 75
Discharge: HOME | End: 2020-01-02
Attending: PAIN MEDICINE
Payer: MEDICARE

## 2020-01-02 ENCOUNTER — OFFICE VISIT (OUTPATIENT)
Dept: PAIN MEDICINE | Facility: CLINIC | Age: 75
End: 2020-01-02
Attending: FAMILY MEDICINE
Payer: MEDICARE

## 2020-01-02 VITALS
DIASTOLIC BLOOD PRESSURE: 89 MMHG | HEIGHT: 66 IN | HEART RATE: 97 BPM | SYSTOLIC BLOOD PRESSURE: 144 MMHG | WEIGHT: 202 LBS | BODY MASS INDEX: 32.47 KG/M2

## 2020-01-02 DIAGNOSIS — M47.816 LUMBAR SPONDYLOSIS: ICD-10-CM

## 2020-01-02 DIAGNOSIS — M54.16 LUMBAR RADICULOPATHY: ICD-10-CM

## 2020-01-02 DIAGNOSIS — M47.816 LUMBAR SPONDYLOSIS: Primary | ICD-10-CM

## 2020-01-02 PROCEDURE — 1159F MED LIST DOCD IN RCRD: CPT | Mod: ,,, | Performed by: PAIN MEDICINE

## 2020-01-02 PROCEDURE — 99204 OFFICE O/P NEW MOD 45 MIN: CPT | Mod: S$PBB,,, | Performed by: PAIN MEDICINE

## 2020-01-02 PROCEDURE — 1125F AMNT PAIN NOTED PAIN PRSNT: CPT | Mod: ,,, | Performed by: PAIN MEDICINE

## 2020-01-02 PROCEDURE — 72100 X-RAY EXAM L-S SPINE 2/3 VWS: CPT | Mod: 26,,, | Performed by: RADIOLOGY

## 2020-01-02 PROCEDURE — 1125F PR PAIN SEVERITY QUANTIFIED, PAIN PRESENT: ICD-10-PCS | Mod: ,,, | Performed by: PAIN MEDICINE

## 2020-01-02 PROCEDURE — 1159F PR MEDICATION LIST DOCUMENTED IN MEDICAL RECORD: ICD-10-PCS | Mod: ,,, | Performed by: PAIN MEDICINE

## 2020-01-02 PROCEDURE — 99213 OFFICE O/P EST LOW 20 MIN: CPT | Mod: PBBFAC,25 | Performed by: PAIN MEDICINE

## 2020-01-02 PROCEDURE — 99999 PR PBB SHADOW E&M-EST. PATIENT-LVL III: CPT | Mod: PBBFAC,,, | Performed by: PAIN MEDICINE

## 2020-01-02 PROCEDURE — 99204 PR OFFICE/OUTPT VISIT, NEW, LEVL IV, 45-59 MIN: ICD-10-PCS | Mod: S$PBB,,, | Performed by: PAIN MEDICINE

## 2020-01-02 PROCEDURE — 72100 X-RAY EXAM L-S SPINE 2/3 VWS: CPT | Mod: TC

## 2020-01-02 PROCEDURE — 72100 XR LUMBAR SPINE AP AND LATERAL: ICD-10-PCS | Mod: 26,,, | Performed by: RADIOLOGY

## 2020-01-02 PROCEDURE — 99999 PR PBB SHADOW E&M-EST. PATIENT-LVL III: ICD-10-PCS | Mod: PBBFAC,,, | Performed by: PAIN MEDICINE

## 2020-01-02 RX ORDER — METHYLPREDNISOLONE 4 MG/1
TABLET ORAL
Qty: 1 PACKAGE | Refills: 0 | Status: SHIPPED | OUTPATIENT
Start: 2020-01-02 | End: 2020-03-03

## 2020-01-02 RX ORDER — GUAIFENESIN AND PHENYLEPHRINE HCL 400; 10 MG/1; MG/1
500 TABLET ORAL 2 TIMES DAILY
Qty: 60 CAPSULE | Refills: 3 | COMMUNITY
Start: 2020-01-02 | End: 2020-03-03

## 2020-01-02 NOTE — PATIENT INSTRUCTIONS
-provide a referral physical therapy  -obtain lumbar x-ray today  -will start Medrol Dosepak  -will start turmeric 500 mg twice daily  -follow up in clinic in 6 weeks

## 2020-01-02 NOTE — LETTER
January 2, 2020      Anne Juarez MD  [address] [facility] [address]           [facility]  [address] [facility] [address]  Phone: [phone]  Fax: [phone]          Patient: Tamara Barriga   MR Number: [id number]   YOB: 1945   Date of Visit: 1/2/2020       Dear Dr. Anne Juarez:    Thank you for referring Tamara Barriga to me for evaluation. Attached you will find relevant portions of my assessment and plan of care.    If you have questions, please do not hesitate to call me. I look forward to following Tamara Barriga along with you.    Sincerely,    Vinay Prather MD    Enclosure  CC:  No Recipients    If you would like to receive this communication electronically, please contact [email or web address] or [phone] to request more information on [facility] access.    For providers and/or their staff who would like to refer a patient to [facility], please contact us through our one-stop-shop provider referral line, [facility], at [phone].    If you feel you have received this communication in error or would no longer like to receive these types of communications, please e-mail [email or web address]

## 2020-01-02 NOTE — PROGRESS NOTES
Chief Pain Complaint:  Low back and left leg pain    This note was created using voice recognition, there may be errors that were missed during proofreading.    History of Present Illness:   This patient is a 74 y.o. female who presents today complaining of the above noted pain/s. The patient describes the pain as follows.  Ms. Killianes   His new patient clinic with complaints of low back pain.  She has had low back pain off and on for several years and ports undergoing 4 surgeries on her lumbar spine which have included disc surgeries in addition to bone spur surgeries however she denies ever having had a fusion.  She picked up a suitcase approximately 2 months ago which caused her to have sharp pain in her low back and radiate into her left lower extremity to the top of her foot.  She also has poorly-controlled diabetes and neuropathy in her bilateral feet.  She describes mostly a throbbing aching sensation which is worse with walking and sitting improved with heating pad.  She also takes ibuprofen approximately 9 tablets per day which does provide minimal pain relief.  She denies having bowel bladder difficulty.  She also takes gabapentin, Cymbalta which she does find to be helpful.  She does endorse having numbness weakness in the left leg but denies having symptoms in the right leg.  She has completed physical therapy the past however not recently.  Previous Therapy:  Medications:  Gabapentin, Cymbalta, Voltaren, ibuprofen  Injections: None  Surgeries:  Multiple lumbar surgeries  Physical Therapy: Completed in the Past    Past Surgical History:   Procedure Laterality Date    BACK SURGERY      BREAST CYST EXCISION Bilateral     2007    CATARACT EXTRACTION W/  INTRAOCULAR LENS IMPLANT Right 08/15/2018    CATARACT EXTRACTION W/  INTRAOCULAR LENS IMPLANT Left 01/02/2019    CHOLECYSTECTOMY      HYSTERECTOMY      LIVER BIOPSY      OOPHORECTOMY      TOTAL REDUCTION MAMMOPLASTY Bilateral 2007       Family  History   Problem Relation Age of Onset    Cancer Daughter         Breast Ca     Breast cancer Daughter     Breast cancer Sister        Social History     Socioeconomic History    Marital status:      Spouse name: Not on file    Number of children: Not on file    Years of education: Not on file    Highest education level: Not on file   Occupational History    Not on file   Social Needs    Financial resource strain: Not on file    Food insecurity:     Worry: Not on file     Inability: Not on file    Transportation needs:     Medical: Not on file     Non-medical: Not on file   Tobacco Use    Smoking status: Never Smoker    Smokeless tobacco: Never Used   Substance and Sexual Activity    Alcohol use: Yes     Alcohol/week: 0.0 - 1.0 standard drinks    Drug use: No    Sexual activity: Never   Lifestyle    Physical activity:     Days per week: Not on file     Minutes per session: Not on file    Stress: Not on file   Relationships    Social connections:     Talks on phone: Not on file     Gets together: Not on file     Attends Latter-day service: Not on file     Active member of club or organization: Not on file     Attends meetings of clubs or organizations: Not on file     Relationship status: Not on file   Other Topics Concern    Not on file   Social History Narrative    Not on file      Imaging / Labs / Studies (reviewed on 1/2/2020):    Review of Systems:  Review of Systems   Constitutional: Negative for fever.   Eyes: Negative for blurred vision.   Respiratory: Negative for cough and wheezing.    Cardiovascular: Negative for chest pain and orthopnea.   Gastrointestinal: Negative for constipation, diarrhea, nausea and vomiting.   Genitourinary: Negative for dysuria.   Musculoskeletal: Positive for back pain.   Skin: Negative for itching and rash.   Neurological: Positive for weakness.   Endo/Heme/Allergies: Does not bruise/bleed easily.       Physical Exam:  BP (!) 144/89   Pulse 97   Ht  "5' 6" (1.676 m)   Wt 91.6 kg (202 lb)   BMI 32.60 kg/m²  (reviewed on 2020)\  General    Constitutional: She is oriented to person, place, and time. She appears well-developed and well-nourished.   HENT:   Head: Normocephalic and atraumatic.   Eyes: EOM are normal.   Neck: Neck supple.   Cardiovascular: Normal rate.    Pulmonary/Chest: Effort normal.   Abdominal: She exhibits no distension.   Neurological: She is alert and oriented to person, place, and time. No cranial nerve deficit.   Psychiatric: She has a normal mood and affect.     General Musculoskeletal Exam   Gait: antalgic     Back (L-Spine & T-Spine) / Neck (C-Spine) Exam     Tenderness Right paramedian tenderness of the Lower L-Spine. Left paramedian tenderness of the Lower L-Spine.     Back (L-Spine & T-Spine) Range of Motion   Extension: normal   Flexion: normal   Lateral bend right: normal   Lateral bend left: normal   Rotation right: normal   Rotation left: normal     Spinal Sensation   Right Side Sensation  L-Spine Level: normal  Left Side Sensation  L-Spine Level: normal    Comments:  Large midline lumbar surgical scar which is well-healed; minimal tenderness to palpation over bilateral lower lumbar facets; negative straight leg raise bilaterally for radicular symptoms; sensation intact to light touch bilateral lower extremities to the mid lower leg      Muscle Strength   Right Lower Extremity   Hip Flexion: 5/5   Quadriceps:  5/5   Hamstrin/5   Left Lower Extremity   Hip Flexion: 5/5   Quadriceps:  5/5   Hamstrin/5     Reflexes     Left Side  Quadriceps:  2+  Achilles:  2+  Ankle Clonus:  absent    Right Side   Quadriceps:  2+  Achilles:  2+  Ankle Clonus:  absent      Assessment  Lumbar Radiculopathy  Lumbar Degenerative Disc Disease    1. 74 y.o. year old patient with PMH of   Past Medical History:   Diagnosis Date    Anxiety     Cataract     Diabetes mellitus, type 2 few weeks ago     Pt says is was good    DM (diabetes " mellitus) 20 years    BS didn't check 10/01/2019    Hyperlipidemia     Hypertension     Insomnia       presenting with pain located lumbar spine, left leg  2. Pain Generators / Etiology: Lumbar Radiculopathy, Lumbar Spondylosis and Lumbar Degenerative Disc Disease  3. Failed Meds (E- Effective, NE- Not Effective):  Gabapentin-effective, Cymbalta-effective, Voltaren-minimally effective, ibuprofen-effective  4. Physical Therapy - Completed in the Past  5. Psychological comorbidities - None  6. Anticoagulants / Antiplatelets: None     PLAN:  1. Medications:  Continue all medications as prescribed; will start Medrol Dosepak in addition to turmeric 500 mg twice daily    2. PT - provide a referral for physical therapy  3. Psychological - none  4. Labs - obtain  none  5. Imaging - obtain lumbar x-ray today  6. Interventions - schedule none; consider left L5 transforaminal epidural steroid injection in the future  7. Referrals - none  8. Records - none  9. Follow up visit - follow up in clinic in 6 weeks  10. Patient Questions - answered all of the patient's questions regarding diagnosis, therapy, and treatment  11.  This condition does not require this patient to take time off of work    PARI Prather MD  Interventional Pain  [facility] [place]

## 2020-01-06 ENCOUNTER — CLINICAL SUPPORT (OUTPATIENT)
Dept: REHABILITATION | Facility: HOSPITAL | Age: 75
End: 2020-01-06
Attending: PAIN MEDICINE
Payer: MEDICARE

## 2020-01-06 DIAGNOSIS — R29.898 WEAKNESS OF LEFT LEG: ICD-10-CM

## 2020-01-06 DIAGNOSIS — R29.3 POSTURE IMBALANCE: ICD-10-CM

## 2020-01-06 DIAGNOSIS — M25.60 STIFFNESS IN JOINT: ICD-10-CM

## 2020-01-06 DIAGNOSIS — M54.42 CHRONIC LEFT-SIDED LOW BACK PAIN WITH LEFT-SIDED SCIATICA: Primary | ICD-10-CM

## 2020-01-06 DIAGNOSIS — G89.29 CHRONIC LEFT-SIDED LOW BACK PAIN WITH LEFT-SIDED SCIATICA: Primary | ICD-10-CM

## 2020-01-06 PROCEDURE — 97014 ELECTRIC STIMULATION THERAPY: CPT

## 2020-01-06 PROCEDURE — 97110 THERAPEUTIC EXERCISES: CPT

## 2020-01-06 PROCEDURE — 97161 PT EVAL LOW COMPLEX 20 MIN: CPT

## 2020-01-06 RX ORDER — DIAPER,BRIEF,ADULT, DISPOSABLE
EACH MISCELLANEOUS
Qty: 100 STRIP | Refills: 2 | Status: SHIPPED | OUTPATIENT
Start: 2020-01-06

## 2020-01-06 NOTE — PLAN OF CARE
[facility]  Physical Therapy Initial Evaluation    Name: Tamara Barriga  Clinic Number: [id number]    Therapy Diagnosis:   Encounter Diagnoses   Name Primary?    Chronic left-sided low back pain with left-sided sciatica Yes    Stiffness in joint     Posture imbalance     Weakness of left leg      Physician: Vinay Prather MD    Physician Orders: PT Eval and Treat   Medical Diagnosis from Referral:   M47.816 (ICD-10-CM) - Lumbar spondylosis   M54.16 (ICD-10-CM) - Lumbar radiculopathy       Evaluation Date: 1/6/2020  Authorization Period Expiration: 1/1/21  Plan of Care Expiration: 2/20/20  Visit # / Visits authorized: 1/ 12    Time In: 1:15p  Time Out: 2:22p  Total Billable Time: 12 minutes    Precautions: Standard    Subjective   Date of onset: about 1-2 weeks before Thanksgiving  History of current condition - aTmara reports: long hx of chronic LBP with L LE pain/symptoms over past 30-35yrs. Her LBP have been doing fairly well since last of 4 lumbar surgeries 5 yr ago, but after lifting on suitcase and a long drive before Thanksgiving she has noticed worsening LBP, which is not getting better. She notes mild L LBP and most pain from post L hip and down L leg to mid lateral calf area. She reports weakness/heaviness in L leg, which seemed to be present before this recent exacerbation. She also has B foot numbness, but she is not sure if this is from lower back or diabetes. Pt also reports falling out of bed while sleeping 3 nights ago, resulting in bruising at chin, R elbow and R knee. Pt states she is sore at these areas, but feels they are resolving fine. Pt denies any LOC. Pt has not sought medical attention for this fall and does not want to.      Pain:  Current 8/10, worst 10/10, best 4/10   Location: left back  and leg   Description: Aching  Aggravating Factors: lifting, prolonged walking/standing, sitting > 20 min  Easing Factors: nothing and changing positions, advil,  heat    Prior Therapy: none recent for lower back pain  Social History:  lives with her sister who is disabled  Occupation: retired  Prior Level of Function: mod indep with all mobility with intermittent manageable pain  Current Level of Function: impaired functional mobility due to worsening LBP    Imaging, x-ray: Dextroscoliosis noted.  Vertebral body heights maintained.  Minimal grade 1 retrolisthesis of L2 on L3.  Multilevel disc height loss most noted at L4-5 L5-S1.  Multilevel facet arthropathy present.     Medical History:   Past Medical History:   Diagnosis Date    Anxiety     Cataract     Diabetes mellitus, type 2 few weeks ago     Pt says is was good    DM (diabetes mellitus) 20 years    BS didn't check 10/01/2019    Hyperlipidemia     Hypertension     Insomnia        Surgical History:   Tamara Barriga  has a past surgical history that includes Hysterectomy; Back surgery; Liver biopsy; Total Reduction Mammoplasty (Bilateral, 2007); Cholecystectomy; Oophorectomy; Breast cyst excision (Bilateral); Cataract extraction w/  intraocular lens implant (Right, 08/15/2018); and Cataract extraction w/  intraocular lens implant (Left, 01/02/2019).    Medications:   Tamara has a current medication list which includes the following prescription(s): alendronate, amlodipine, atenolol, atorvastatin, canagliflozin, diclofenac, duloxetine, gabapentin, hydrochlorothiazide, insulin aspart u-100, insulin detemir u-100, ketoconazole, liraglutide 0.6 mg/0.1 ml (18 mg/3 ml) subq pnij, lorazepam, metformin, methylprednisolone, multivitamin with minerals, omeprazole, pen needle, diabetic, pen needle, diabetic, telmisartan-hydrochlorothiazide, truetrack test, turmeric root extract, and vitamin d2.    Allergies:   Review of patient's allergies indicates:  No Known Allergies     Pts goals: relief of lower back and L leg pain    Objective       CMS Impairment/Limitation/Restriction for FOTO lumbar Survey    Therapist reviewed  FOTO scores for Tamara Barriga on 1/6/2020.   FOTO documents entered into [employer] - see Media section.    Limitation Score: 56%  Category: Mobility    Current : CK = at least 40% but < 60% impaired, limited or restricted  Goal: CK = at least 40% but < 60% impaired, limited or restricted  Discharge: CK = at least 40% but < 60% impaired, limited or restricted       Posture/Structure: Pt presents with FHP, rounded sh's, thoracic kyphosis, and a mild decrease in LB lordosis.  Mod R trunk side shift observed. Moderate contusion noted chin and minimal bruising R elbow and knee.     Gait: WNL    Neuro/Sensation:   Sensation: mild decrease sensation to light touch L L4    Balance: SL R=GOOD, L=GOOD-    L/sp AROM:     % Pain Present (Y/N)   FB 60 y   RSB 85 n   LSB 55 y   BB 50 n   RRot     LRot       Hip AROM: B hips/LE grossly WFL        L R  Strength:  Hip flexors  4/5 5/5  Quadriceps  4+/5 5/5      Hamstrings  4+/5 5/5     PF   4+/5 5/5     DF   5/5 5/5     Great toe extension 4/5 4+/5         Special Test: Slump Test:  Pos L>R  Distraction:  Pos for relief    SLR Test:  Pos L>R  Quadrant:  Pos L    Leg Length  neg   millgram pos for pain/weakness      PIVM Lumbar/Thoracic: hypomobility lumbar spine without pain with spring testing      Palpation: tight lumbar paraspinals      TREATMENT     Tamara received therapeutic exercises to develop ROM for 12 minutes including:  HEP instruction (LTR, pelvic tilts)    Tamara received the following supervised modalities after being cleared for contradictions: TENS:  Tamara received TENS electrical stimulation for pain to the lumbar. Pt received continuous mode at a rate of 150 pps for 10 minutes. Tamara tolerated treatment well without any adverse effects.     Tamara received hot pack for 10 minutes to lumbar.    Home Exercises and Patient Education Provided    Education provided:   -Education on condition, HEP    Written Home Exercises Provided: yes.  Exercises were reviewed and  Tamara was able to demonstrate them prior to the end of the session.  Tamara demonstrated good  understanding of the education provided.     See EMR under Patient Instructions for exercises provided 1/6/2020.    Assessment   Tamara is a 74 y.o. female referred to outpatient Physical Therapy with a medical diagnosis of lumbar radiculopathy. The patient presents with signs and symptoms consistent with diagnosis along with lumbar stiffness, lateral shift of lumbar spine, L LE weakness, mild impaired sensation L LE, weakness L LE and core These impairments are limiting patient's ability to be indep with functional mobility at home and in community. .     Pt prognosis is Good.   Pt will benefit from skilled outpatient Physical Therapy to address the deficits stated above and in the chart below, provide pt/family education, and to maximize pt's level of independence.     Plan of care discussed with patient: Yes  Pt's spiritual, cultural and educational needs considered and patient is agreeable to the plan of care and goals as stated below:     Anticipated Barriers for therapy: chronic nature of dysfunction, DM    Medical Necessity is demonstrated by the following  History  Co-morbidities and personal factors that may impact the plan of care Co-morbidities:   diabetes    Personal Factors:   lives with disabled sister and at times has to provide physical assistance     low   Examination  Body Structures and Functions, activity limitations and participation restrictions that may impact the plan of care Body Regions:   back  lower extremities    Body Systems:    gross symmetry  ROM  strength    Participation Restrictions:   none    Activity limitations:   Learning and applying knowledge  no deficits    General Tasks and Commands  no deficits    Communication  no deficits    Mobility  walking    Self care  no deficits    Domestic Life  assisting others    Interactions/Relationships  no deficits    Life Areas  no  deficits    Community and Social Life  no deficits         low   Clinical Presentation stable and uncomplicated low   Decision Making/ Complexity Score: low     Goals:  Short Term Goals: In 3 weeks   1.I with HEP  2.Pt to increase lumbar ROM to flex 70%, ext 60%, L SB 70%    3.Pt to have pain less than 6/10 at all times.  4.Pt to improve score on the FOTO to less than 45%.  5. Pt tolerated sitting > 30 min without increase in pain      Long Term Goals: In 6 weeks  1.Patient to improve score on the FOTO to >30%.  2. Patient to demo increase in LE strength to 4+/5 to 5/5 grossly  3. Patient to have decreased pain to 3/10 at worst.  4. Patient to demo increase lumbar ROM to flex 80%, ext 70%, L SB to WFL.  4. Patient to perform daily activities including sitting, walking without limitation.      Plan   Plan of care Certification: 1/6/2020 to 2/20/20.    Outpatient Physical Therapy 2 times weekly for 6 weeks to include the following interventions: Cervical/Lumbar Traction, Electrical Stimulation TENS/IFC, Manual Therapy, Moist Heat/ Ice, Neuromuscular Re-ed, Patient Education and Therapeutic Exercise.     Abdi Sesay, PT    Thank you for this referral.    These services are reasonable and necessary for the conditions set forth above while under my care.

## 2020-01-07 NOTE — TELEPHONE ENCOUNTER
----- Message from Brit Fontenot sent at 1/7/2020  8:14 AM CST -----  Contact: Lynn ([facility])  Caller is calling to speak with the nurse regarding a fax she received for a medication and needs to have it clarified      [id number] - [place] - [address]  [facility] [address]  Phone: [phone] Fax: [phone]

## 2020-01-10 ENCOUNTER — CLINICAL SUPPORT (OUTPATIENT)
Dept: REHABILITATION | Facility: HOSPITAL | Age: 75
End: 2020-01-10
Payer: MEDICARE

## 2020-01-10 DIAGNOSIS — M54.42 CHRONIC LEFT-SIDED LOW BACK PAIN WITH LEFT-SIDED SCIATICA: Primary | ICD-10-CM

## 2020-01-10 DIAGNOSIS — R29.898 WEAKNESS OF LEFT LEG: ICD-10-CM

## 2020-01-10 DIAGNOSIS — G89.29 CHRONIC LEFT-SIDED LOW BACK PAIN WITH LEFT-SIDED SCIATICA: Primary | ICD-10-CM

## 2020-01-10 DIAGNOSIS — R29.3 POSTURE IMBALANCE: ICD-10-CM

## 2020-01-10 DIAGNOSIS — M25.60 STIFFNESS IN JOINT: ICD-10-CM

## 2020-01-10 PROCEDURE — 97110 THERAPEUTIC EXERCISES: CPT

## 2020-01-10 PROCEDURE — 97014 ELECTRIC STIMULATION THERAPY: CPT

## 2020-01-10 PROCEDURE — 97140 MANUAL THERAPY 1/> REGIONS: CPT

## 2020-01-10 NOTE — PROGRESS NOTES
Physical Therapy Daily Treatment Note     Name: Tamara Barriga  Clinic Number: [id number]    Therapy Diagnosis:   Encounter Diagnoses   Name Primary?    Chronic left-sided low back pain with left-sided sciatica Yes    Stiffness in joint     Posture imbalance     Weakness of left leg      Physician: Vinay Prather MD    Visit Date: 1/10/2020    Physician Orders: PT Eval and Treat   Medical Diagnosis from Referral:   M47.816 (ICD-10-CM) - Lumbar spondylosis   M54.16 (ICD-10-CM) - Lumbar radiculopathy         Evaluation Date: 1/6/2020  Authorization Period Expiration: 1/1/21  Plan of Care Expiration: 2/20/20  Visit # / Visits authorized: 2/ 12     Time In: 11:15a  Time Out: 12:20p  Total Billable Time: 39 minutes    Precautions: Standard    Subjective     Pt reports: having more L leg pain since last treatment.  She was compliant with home exercise program. She noted increased L leg pain with pelvic tilts  Response to previous treatment: increased L LE pain  Functional change: nothing at this time    Pain: nt/10  Location: bilateral back  and L leg      Objective     Tamara received therapeutic exercises to develop strength, endurance, ROM, flexibility, posture and core stabilization for 29 minutes including:  nustep 5min  LTR with ball 3min  Pelvic tilts 3/10  Supine R/L hamstring stretching with strap 3/15sec ea  theraball weight shifting 2/2min  Abdominal bracing lying on 1/2 roll 2/15    Tamara received the following manual therapy techniques: Manual traction were applied to the:  for 10 minutes, including:  Manual lumbar traction    Tamara received the following supervised modalities after being cleared for contradictions: TENS:  Tamara received TENS electrical stimulation for pain to the lumbar spine. Pt received continuous mode at a rate of 150 pps for 10 minutes. Tamara tolerated treatment well without any adverse effects.     Tamara received hot pack for 10 minutes to lumbar.      Home Exercises  Provided and Patient Education Provided     Education provided:   - reviewed pelvic tilts for HEP    Written Home Exercises Provided: Patient instructed to cont prior HEP.  Exercises were reviewed and Tamara was able to demonstrate them prior to the end of the session.  Tamara demonstrated good  understanding of the education provided.     See EMR under Patient Instructions for exercises provided prior visit.    Assessment     Pt presented with increased c/o L LE pain today. Observed her performing HEP and she was performing bridging instead of pelvic tilts. We addressed this and pt was able to complete pelvic tilts properly and without pain. She noted a significant reduction of L leg pains following manual traction and at end of treatment.   Tamara is progressing well towards her goals.   Pt prognosis is Good.     Pt will continue to benefit from skilled outpatient physical therapy to address the deficits listed in the problem list box on initial evaluation, provide pt/family education and to maximize pt's level of independence in the home and community environment.     Pt's spiritual, cultural and educational needs considered and pt agreeable to plan of care and goals.     Anticipated barriers to physical therapy:     Goals:  Short Term Goals: In 3 weeks   1.I with HEP  2.Pt to increase lumbar ROM to flex 70%, ext 60%, L SB 70%    3.Pt to have pain less than 6/10 at all times.  4.Pt to improve score on the FOTO to less than 45%.  5. Pt tolerated sitting > 30 min without increase in pain        Long Term Goals: In 6 weeks  1.Patient to improve score on the FOTO to >30%.  2. Patient to demo increase in LE strength to 4+/5 to 5/5 grossly  3. Patient to have decreased pain to 3/10 at worst.  4. Patient to demo increase lumbar ROM to flex 80%, ext 70%, L SB to WFL.  4. Patient to perform daily activities including sitting, walking without limitation.    Plan     Plan of care Certification: 1/6/2020 to  2/20/20.     Outpatient Physical Therapy 2 times weekly for 6 weeks to include the following interventions: Cervical/Lumbar Traction, Electrical Stimulation TENS/IFC, Manual Therapy, Moist Heat/ Ice, Neuromuscular Re-ed, Patient Education and Therapeutic Exercise.     Abdi Sesay, PT

## 2020-01-13 ENCOUNTER — TELEPHONE (OUTPATIENT)
Dept: FAMILY MEDICINE | Facility: CLINIC | Age: 75
End: 2020-01-13

## 2020-01-13 NOTE — TELEPHONE ENCOUNTER
----- Message from Malia Smith sent at 1/10/2020 10:53 AM CST -----  Contact: Pharamcy  Type:  Pharmacy Calling to Clarify an RX    Name of Caller: Temo  Pharmacy Name:   [id number] [place] - [address] AT [address] [address]  Phone: [phone] Fax: [phone]  Prescription Name: Truetrack test strips  What do they need to clarify?: Missing signature  Best Call Back Number: Please call him at [phone]  Additional Information: n/a

## 2020-01-16 ENCOUNTER — CLINICAL SUPPORT (OUTPATIENT)
Dept: REHABILITATION | Facility: HOSPITAL | Age: 75
End: 2020-01-16
Payer: MEDICARE

## 2020-01-16 DIAGNOSIS — M54.42 CHRONIC LEFT-SIDED LOW BACK PAIN WITH LEFT-SIDED SCIATICA: Primary | ICD-10-CM

## 2020-01-16 DIAGNOSIS — R29.898 WEAKNESS OF LEFT LEG: ICD-10-CM

## 2020-01-16 DIAGNOSIS — M25.60 STIFFNESS IN JOINT: ICD-10-CM

## 2020-01-16 DIAGNOSIS — G89.29 CHRONIC LEFT-SIDED LOW BACK PAIN WITH LEFT-SIDED SCIATICA: Primary | ICD-10-CM

## 2020-01-16 DIAGNOSIS — R29.3 POSTURE IMBALANCE: ICD-10-CM

## 2020-01-16 PROCEDURE — 97014 ELECTRIC STIMULATION THERAPY: CPT

## 2020-01-16 PROCEDURE — 97110 THERAPEUTIC EXERCISES: CPT

## 2020-01-16 PROCEDURE — 97140 MANUAL THERAPY 1/> REGIONS: CPT

## 2020-01-16 NOTE — PROGRESS NOTES
Physical Therapy Daily Treatment Note     Name: Tamara Barriga  Clinic Number: [id number]    Therapy Diagnosis:   Encounter Diagnoses   Name Primary?    Chronic left-sided low back pain with left-sided sciatica Yes    Stiffness in joint     Posture imbalance     Weakness of left leg      Physician: Vinay Prather MD    Visit Date: 1/16/2020    Physician Orders: PT Eval and Treat   Medical Diagnosis from Referral:   M47.816 (ICD-10-CM) - Lumbar spondylosis   M54.16 (ICD-10-CM) - Lumbar radiculopathy         Evaluation Date: 1/6/2020  Authorization Period Expiration: 1/1/21  Plan of Care Expiration: 2/20/20  Visit # / Visits authorized: 3/ 12     Time In: 11:00a  Time Out: 12:10p  Total Billable Time:  minutes    Precautions: Standard    Subjective     Pt reports: her lower back and L leg are feeling better. She notes L leg pain just travels to her knee area. She started taking a steroid dosepack yesterday.    She was compliant with home exercise program.   Response to previous treatment: decreased pain  Functional change: nothing at this time    Pain: nt/10  Location: bilateral back  and L leg      Objective     Tamara received therapeutic exercises to develop strength, endurance, ROM, flexibility, posture and core stabilization for 31 minutes including:  nustep 5min  LTR with ball 4min  Pelvic tilts with ball 3/10  Supine R/L hamstring stretching with strap 3/15sec ea  theraball weight shifting 2/2min  Abdominal bracing lying on 1/2 roll 2/15  Bridging 2/10    Tamara received the following manual therapy techniques: Manual traction were applied to the:  for 10 minutes, including:  Manual lumbar traction    Tamara received the following supervised modalities after being cleared for contradictions: TENS:  Tamara received TENS electrical stimulation for pain to the lumbar spine. Pt received continuous mode at a rate of 150 pps for 10 minutes. Tamara tolerated treatment well without any adverse effects.      Tamara received hot pack for 10 minutes to lumbar.      Home Exercises Provided and Patient Education Provided     Education provided:   -    Written Home Exercises Provided: Patient instructed to cont prior HEP.  Exercises were reviewed and Tamara was able to demonstrate them prior to the end of the session.  Tamara demonstrated good  understanding of the education provided.     See EMR under Patient Instructions for exercises provided prior visit.    Assessment     Pt noted minimal to absent LBP or L LE pain with treatment today. Added bridging without complaint. Informed pt to not be too aggressive since she is feeling better, especially since taking steroid dosepack.    Tamara is progressing well towards her goals.   Pt prognosis is Good.     Pt will continue to benefit from skilled outpatient physical therapy to address the deficits listed in the problem list box on initial evaluation, provide pt/family education and to maximize pt's level of independence in the home and community environment.     Pt's spiritual, cultural and educational needs considered and pt agreeable to plan of care and goals.     Anticipated barriers to physical therapy:     Goals:  Short Term Goals: In 3 weeks   1.I with HEP  2.Pt to increase lumbar ROM to flex 70%, ext 60%, L SB 70%    3.Pt to have pain less than 6/10 at all times.  4.Pt to improve score on the FOTO to less than 45%.  5. Pt tolerated sitting > 30 min without increase in pain        Long Term Goals: In 6 weeks  1.Patient to improve score on the FOTO to >30%.  2. Patient to demo increase in LE strength to 4+/5 to 5/5 grossly  3. Patient to have decreased pain to 3/10 at worst.  4. Patient to demo increase lumbar ROM to flex 80%, ext 70%, L SB to WFL.  4. Patient to perform daily activities including sitting, walking without limitation.    Plan     Plan of care Certification: 1/6/2020 to 2/20/20.     Outpatient Physical Therapy 2 times weekly for 6 weeks to include the  following interventions: Cervical/Lumbar Traction, Electrical Stimulation TENS/IFC, Manual Therapy, Moist Heat/ Ice, Neuromuscular Re-ed, Patient Education and Therapeutic Exercise.     Abdi Sesay, PT

## 2020-01-17 ENCOUNTER — CLINICAL SUPPORT (OUTPATIENT)
Dept: REHABILITATION | Facility: HOSPITAL | Age: 75
End: 2020-01-17
Payer: MEDICARE

## 2020-01-17 DIAGNOSIS — M25.60 STIFFNESS IN JOINT: ICD-10-CM

## 2020-01-17 DIAGNOSIS — R29.3 POSTURE IMBALANCE: ICD-10-CM

## 2020-01-17 DIAGNOSIS — R29.898 WEAKNESS OF LEFT LEG: ICD-10-CM

## 2020-01-17 DIAGNOSIS — G89.29 CHRONIC LEFT-SIDED LOW BACK PAIN WITH LEFT-SIDED SCIATICA: Primary | ICD-10-CM

## 2020-01-17 DIAGNOSIS — M54.42 CHRONIC LEFT-SIDED LOW BACK PAIN WITH LEFT-SIDED SCIATICA: Primary | ICD-10-CM

## 2020-01-17 PROCEDURE — 97140 MANUAL THERAPY 1/> REGIONS: CPT

## 2020-01-17 PROCEDURE — 97110 THERAPEUTIC EXERCISES: CPT

## 2020-01-17 PROCEDURE — 97014 ELECTRIC STIMULATION THERAPY: CPT

## 2020-01-17 NOTE — PROGRESS NOTES
Physical Therapy Daily Treatment Note     Name: Tamara Barriga  Clinic Number: [id number]    Therapy Diagnosis:   Encounter Diagnoses   Name Primary?    Chronic left-sided low back pain with left-sided sciatica Yes    Stiffness in joint     Posture imbalance     Weakness of left leg      Physician: Vinay Prather MD    Visit Date: 1/17/2020    Physician Orders: PT Eval and Treat   Medical Diagnosis from Referral:   M47.816 (ICD-10-CM) - Lumbar spondylosis   M54.16 (ICD-10-CM) - Lumbar radiculopathy         Evaluation Date: 1/6/2020  Authorization Period Expiration: 1/1/21  Plan of Care Expiration: 2/20/20  Visit # / Visits authorized: 4/ 12     Time In: 10:50a  Time Out: 11:55a  Total Billable Time:  minutes    Precautions: Standard    Subjective     Pt reports: her lower back and L leg felt great yesterday. She felt she was pretty active yesterday as a result of feeling better. Last night she had mild L thigh soreness, which is minimally present this morning.    She was compliant with home exercise program.   Response to previous treatment: decreased pain  Functional change: nothing at this time    Pain: nt/10  Location: bilateral back  and L leg      Objective     Tamara received therapeutic exercises to develop strength, endurance, ROM, flexibility, posture and core stabilization for 31 minutes including:  nustep 5min  LTR with ball 4min  Pelvic tilts with ball 3/10  Supine R/L hamstring stretching with strap 3/15sec ea  theraball weight shifting 2/2min  Abdominal bracing lying on 1/2 roll 2/15  Bridging 2/10    Tamara received the following manual therapy techniques: Manual traction were applied to the:  for 10 minutes, including:  Manual lumbar traction    Tamara received the following supervised modalities after being cleared for contradictions: TENS:  Tamara received TENS electrical stimulation for pain to the lumbar spine. Pt received continuous mode at a rate of 150 pps for 10 minutes. Tamara  tolerated treatment well without any adverse effects.     Tamara received hot pack for 10 minutes to lumbar.      Home Exercises Provided and Patient Education Provided     Education provided:   -    Written Home Exercises Provided: Patient instructed to cont prior HEP.  Exercises were reviewed and Tamara was able to demonstrate them prior to the end of the session.  Tamara demonstrated good  understanding of the education provided.     See EMR under Patient Instructions for exercises provided prior visit.    Assessment     Pt noted her LBP and L LE continued to feel really good with treatment today. She noted no real pain with therex. Continued to caution her about being too active while she is on steroids.      Tamara is progressing well towards her goals.   Pt prognosis is Good.     Pt will continue to benefit from skilled outpatient physical therapy to address the deficits listed in the problem list box on initial evaluation, provide pt/family education and to maximize pt's level of independence in the home and community environment.     Pt's spiritual, cultural and educational needs considered and pt agreeable to plan of care and goals.     Anticipated barriers to physical therapy:     Goals:  Short Term Goals: In 3 weeks   1.I with HEP  2.Pt to increase lumbar ROM to flex 70%, ext 60%, L SB 70%    3.Pt to have pain less than 6/10 at all times.  4.Pt to improve score on the FOTO to less than 45%.  5. Pt tolerated sitting > 30 min without increase in pain        Long Term Goals: In 6 weeks  1.Patient to improve score on the FOTO to >30%.  2. Patient to demo increase in LE strength to 4+/5 to 5/5 grossly  3. Patient to have decreased pain to 3/10 at worst.  4. Patient to demo increase lumbar ROM to flex 80%, ext 70%, L SB to WFL.  4. Patient to perform daily activities including sitting, walking without limitation.    Plan     Plan of care Certification: 1/6/2020 to 2/20/20.     Outpatient Physical Therapy 2  times weekly for 6 weeks to include the following interventions: Cervical/Lumbar Traction, Electrical Stimulation TENS/IFC, Manual Therapy, Moist Heat/ Ice, Neuromuscular Re-ed, Patient Education and Therapeutic Exercise.     Abdi Sesay, PT

## 2020-01-21 ENCOUNTER — CLINICAL SUPPORT (OUTPATIENT)
Dept: REHABILITATION | Facility: HOSPITAL | Age: 75
End: 2020-01-21
Payer: MEDICARE

## 2020-01-21 DIAGNOSIS — G89.29 CHRONIC LEFT-SIDED LOW BACK PAIN WITH LEFT-SIDED SCIATICA: Primary | ICD-10-CM

## 2020-01-21 DIAGNOSIS — R29.898 WEAKNESS OF LEFT LEG: ICD-10-CM

## 2020-01-21 DIAGNOSIS — M54.42 CHRONIC LEFT-SIDED LOW BACK PAIN WITH LEFT-SIDED SCIATICA: Primary | ICD-10-CM

## 2020-01-21 DIAGNOSIS — R29.3 POSTURE IMBALANCE: ICD-10-CM

## 2020-01-21 DIAGNOSIS — M25.60 STIFFNESS IN JOINT: ICD-10-CM

## 2020-01-21 PROCEDURE — 97014 ELECTRIC STIMULATION THERAPY: CPT

## 2020-01-21 PROCEDURE — 97140 MANUAL THERAPY 1/> REGIONS: CPT

## 2020-01-21 PROCEDURE — 97110 THERAPEUTIC EXERCISES: CPT

## 2020-01-21 NOTE — PROGRESS NOTES
Physical Therapy Daily Treatment Note     Name: Tamara Barriga  Clinic Number: [id number]    Therapy Diagnosis:   Encounter Diagnoses   Name Primary?    Chronic left-sided low back pain with left-sided sciatica Yes    Stiffness in joint     Posture imbalance     Weakness of left leg      Physician: Vinay Prather MD    Visit Date: 1/21/2020    Physician Orders: PT Eval and Treat   Medical Diagnosis from Referral:   M47.816 (ICD-10-CM) - Lumbar spondylosis   M54.16 (ICD-10-CM) - Lumbar radiculopathy         Evaluation Date: 1/6/2020  Authorization Period Expiration: 1/1/21  Plan of Care Expiration: 2/20/20  Visit # / Visits authorized: 5/ 12     Time In: 10:56a  Time Out: 12:00p  Total Billable Time:47  minutes    Precautions: Standard    Subjective     Pt reports: she finished taking steroid dosepack on Sat and has felt a little increase in post L hip area pain.    She was compliant with home exercise program.   Response to previous treatment: decreased pain  Functional change: nothing at this time    Pain: nt/10  Location: bilateral back  and L leg      Objective       CMS Impairment/Limitation/Restriction for FOTO lumbar Survey    Therapist reviewed FOTO scores for Tamara Barriga on 1/21/2020.   FOTO documents entered into [employer] - see Media section.    Limitation Score: 42%           Tamara received therapeutic exercises to develop strength, endurance, ROM, flexibility, posture and core stabilization for 37 minutes including:  nustep 7min  LTR with ball 4min  Pelvic tilts with ball 3/10  Supine R/L hamstring stretching with strap 3/15sec ea  theraball weight shifting 2/2min  Abdominal bracing  With marching lying on 1/2 roll 2/15  Bridging 3/10  R/L clamshells 2/15/green nusrat    Tamara received the following manual therapy techniques: Manual traction were applied to the:  for 10 minutes, including:  Manual lumbar traction    Tamara received the following supervised modalities after being cleared for  contradictions: TENS:  Tamara received TENS electrical stimulation for pain to the lumbar spine. Pt received continuous mode at a rate of 150 pps for 10 minutes. Tamara tolerated treatment well without any adverse effects.     Tamara received hot pack for 10 minutes to lumbar.      Home Exercises Provided and Patient Education Provided     Education provided:   -    Written Home Exercises Provided: Patient instructed to cont prior HEP.  Exercises were reviewed and Tamara was able to demonstrate them prior to the end of the session.  Tamara demonstrated good  understanding of the education provided.     See EMR under Patient Instructions for exercises provided prior visit.    Assessment     Pt presented with increased reports of post L hip pain, which coincides with finishing steroids. She noted her L post hip symptoms were not very present with therex, thus progressed with marching with abd bracing and added clamshells. She felt she tolerated therex without difficulty and noted no symptoms during manual traction.        Tamara is progressing well towards her goals.   Pt prognosis is Good.     Pt will continue to benefit from skilled outpatient physical therapy to address the deficits listed in the problem list box on initial evaluation, provide pt/family education and to maximize pt's level of independence in the home and community environment.     Pt's spiritual, cultural and educational needs considered and pt agreeable to plan of care and goals.     Anticipated barriers to physical therapy:     Goals:  Short Term Goals: In 3 weeks   1.I with HEP  2.Pt to increase lumbar ROM to flex 70%, ext 60%, L SB 70%    3.Pt to have pain less than 6/10 at all times.  4.Pt to improve score on the FOTO to less than 45%.  5. Pt tolerated sitting > 30 min without increase in pain        Long Term Goals: In 6 weeks  1.Patient to improve score on the FOTO to >30%.  2. Patient to demo increase in LE strength to 4+/5 to 5/5  grossly  3. Patient to have decreased pain to 3/10 at worst.  4. Patient to demo increase lumbar ROM to flex 80%, ext 70%, L SB to WFL.  4. Patient to perform daily activities including sitting, walking without limitation.    Plan     Plan of care Certification: 1/6/2020 to 2/20/20.     Outpatient Physical Therapy 2 times weekly for 6 weeks to include the following interventions: Cervical/Lumbar Traction, Electrical Stimulation TENS/IFC, Manual Therapy, Moist Heat/ Ice, Neuromuscular Re-ed, Patient Education and Therapeutic Exercise.     Abdi Sesay, PT

## 2020-01-28 ENCOUNTER — CLINICAL SUPPORT (OUTPATIENT)
Dept: REHABILITATION | Facility: HOSPITAL | Age: 75
End: 2020-01-28
Payer: MEDICARE

## 2020-01-28 DIAGNOSIS — M54.42 CHRONIC LEFT-SIDED LOW BACK PAIN WITH LEFT-SIDED SCIATICA: Primary | ICD-10-CM

## 2020-01-28 DIAGNOSIS — G89.29 CHRONIC LEFT-SIDED LOW BACK PAIN WITH LEFT-SIDED SCIATICA: Primary | ICD-10-CM

## 2020-01-28 DIAGNOSIS — M25.60 STIFFNESS IN JOINT: ICD-10-CM

## 2020-01-28 DIAGNOSIS — R29.3 POSTURE IMBALANCE: ICD-10-CM

## 2020-01-28 DIAGNOSIS — R29.898 WEAKNESS OF LEFT LEG: ICD-10-CM

## 2020-01-28 PROCEDURE — 97140 MANUAL THERAPY 1/> REGIONS: CPT

## 2020-01-28 PROCEDURE — 97110 THERAPEUTIC EXERCISES: CPT

## 2020-01-28 PROCEDURE — 97014 ELECTRIC STIMULATION THERAPY: CPT

## 2020-01-28 RX ORDER — ATENOLOL 100 MG/1
TABLET ORAL
Qty: 90 TABLET | Refills: 0 | Status: SHIPPED | OUTPATIENT
Start: 2020-01-28 | End: 2020-05-20 | Stop reason: SDUPTHER

## 2020-01-28 RX ORDER — ATORVASTATIN CALCIUM 80 MG/1
TABLET, FILM COATED ORAL
Qty: 90 TABLET | Refills: 0 | Status: SHIPPED | OUTPATIENT
Start: 2020-01-28 | End: 2020-05-20 | Stop reason: SDUPTHER

## 2020-01-28 RX ORDER — DULOXETIN HYDROCHLORIDE 60 MG/1
CAPSULE, DELAYED RELEASE ORAL
Qty: 90 CAPSULE | Refills: 0 | Status: SHIPPED | OUTPATIENT
Start: 2020-01-28 | End: 2020-05-20 | Stop reason: SDUPTHER

## 2020-01-28 RX ORDER — TELMISARTAN AND HYDROCHLORTHIAZIDE 40; 12.5 MG/1; MG/1
TABLET ORAL
Qty: 90 TABLET | Refills: 0 | Status: SHIPPED | OUTPATIENT
Start: 2020-01-28 | End: 2020-05-20 | Stop reason: SDUPTHER

## 2020-01-28 NOTE — PROGRESS NOTES
Physical Therapy Daily Treatment Note     Name: Tamara Barriga  Clinic Number: [id number]    Therapy Diagnosis:   Encounter Diagnoses   Name Primary?    Chronic left-sided low back pain with left-sided sciatica Yes    Stiffness in joint     Posture imbalance     Weakness of left leg      Physician: Vinay Prather MD    Visit Date: 1/28/2020    Physician Orders: PT Eval and Treat   Medical Diagnosis from Referral:   M47.816 (ICD-10-CM) - Lumbar spondylosis   M54.16 (ICD-10-CM) - Lumbar radiculopathy         Evaluation Date: 1/6/2020  Authorization Period Expiration: 1/1/21  Plan of Care Expiration: 2/20/20  Visit # / Visits authorized: 5/ 12     Time In: 10:55a  Time Out: 12:10p  Total Billable Time: 43  minutes    Precautions: Standard    Subjective     Pt reports: she has been having increased LBP and L leg pain to knee over past few days. She reports having a long drive recently, which may have contributed to this.    She was compliant with home exercise program.   Response to previous treatment: decreased pain  Functional change: nothing at this time    Pain: nt/10  Location: bilateral back  and L leg      Objective       CMS Impairment/Limitation/Restriction for FOTO lumbar Survey    Therapist reviewed FOTO scores for Tamara Barriga on 1/28/2020.   FOTO documents entered into [employer] - see Media section.    Limitation Score: 42%       Tamara received therapeutic exercises to develop strength, endurance, ROM, flexibility, posture and core stabilization for 33 minutes including:  nustep 7min-def  LTR with ball 4min  Pelvic tilts with ball 2/15  Side to side pelvic clock in supine 2/15   theraball weight shifting 1/2min  theraball marching 1/2min  Abdominal bracing  With marching lying on 1/2 roll 2/15  Bridging 3/10  R/L clamshells 2/15/green ea  Fire hydrants 2/10 ea    Tamara received the following manual therapy techniques: Manual traction were applied to the:  for 10 minutes, including:  Manual  lumbar traction  L LE nerve glides    Tamara received the following supervised modalities after being cleared for contradictions: TENS:  Tamara received TENS electrical stimulation for pain to the lumbar spine. Pt received continuous mode at a rate of 150 pps for 10 minutes. Tamara tolerated treatment well without any adverse effects.     Tamara received hot pack for 10 minutes to lumbar.      Home Exercises Provided and Patient Education Provided     Education provided:   -    Written Home Exercises Provided: Patient instructed to cont prior HEP.  Exercises were reviewed and Tamara was able to demonstrate them prior to the end of the session.  Tamara demonstrated good  understanding of the education provided.     See EMR under Patient Instructions for exercises provided prior visit.    Assessment     Pt presented to PT with significant increase in LBP and L LE pains. Pt also displayed a mild R lateral trunk shift. She noted some LBP soreness with mat exercises and mild increase in L LE pain with pelvic tilts. Following all exercise she reported less LBP and L LE pain as well as displayed less significant trunk shift.         Tamara is progressing well towards her goals.   Pt prognosis is Good.     Pt will continue to benefit from skilled outpatient physical therapy to address the deficits listed in the problem list box on initial evaluation, provide pt/family education and to maximize pt's level of independence in the home and community environment.     Pt's spiritual, cultural and educational needs considered and pt agreeable to plan of care and goals.     Anticipated barriers to physical therapy:     Goals:  Short Term Goals: In 3 weeks   1.I with HEP  2.Pt to increase lumbar ROM to flex 70%, ext 60%, L SB 70%    3.Pt to have pain less than 6/10 at all times.  4.Pt to improve score on the FOTO to less than 45%.  5. Pt tolerated sitting > 30 min without increase in pain        Long Term Goals: In 6  weeks  1.Patient to improve score on the FOTO to >30%.  2. Patient to demo increase in LE strength to 4+/5 to 5/5 grossly  3. Patient to have decreased pain to 3/10 at worst.  4. Patient to demo increase lumbar ROM to flex 80%, ext 70%, L SB to WFL.  4. Patient to perform daily activities including sitting, walking without limitation.    Plan     Plan of care Certification: 1/6/2020 to 2/20/20.     Outpatient Physical Therapy 2 times weekly for 6 weeks to include the following interventions: Cervical/Lumbar Traction, Electrical Stimulation TENS/IFC, Manual Therapy, Moist Heat/ Ice, Neuromuscular Re-ed, Patient Education and Therapeutic Exercise.     Abdi Sesay, PT

## 2020-02-03 ENCOUNTER — TELEPHONE (OUTPATIENT)
Dept: FAMILY MEDICINE | Facility: CLINIC | Age: 75
End: 2020-02-03

## 2020-02-03 ENCOUNTER — TELEPHONE (OUTPATIENT)
Dept: PAIN MEDICINE | Facility: CLINIC | Age: 75
End: 2020-02-03

## 2020-02-03 NOTE — TELEPHONE ENCOUNTER
----- Message from Moisés Moran sent at 2/3/2020  4:08 PM CST -----  Contact: Pt  Please call Tamara regarding her severe pain and she has been unable to get relief [phone].

## 2020-02-03 NOTE — TELEPHONE ENCOUNTER
----- Message from Jodie Campos sent at 2/3/2020 11:00 AM CST -----  Contact: PT   Type:  Needs Medical Advice    Who Called: PT  Symptoms (please be specific): severe back pain & shots down her leg   How long has patient had these symptoms:  Last few weeks   Pharmacy name and phone #:   [id number] - [place] - [address]  [address] [address]  Phone: [phone] Fax: [phone]  Would the patient rather a call back or a response via [facility]? Call back   Best Call Back Number: [phone] (home)   Additional Information: n/a

## 2020-02-04 ENCOUNTER — CLINICAL SUPPORT (OUTPATIENT)
Dept: REHABILITATION | Facility: HOSPITAL | Age: 75
End: 2020-02-04
Payer: MEDICARE

## 2020-02-04 DIAGNOSIS — G89.29 CHRONIC LEFT-SIDED LOW BACK PAIN WITH LEFT-SIDED SCIATICA: Primary | ICD-10-CM

## 2020-02-04 DIAGNOSIS — R29.3 POSTURE IMBALANCE: ICD-10-CM

## 2020-02-04 DIAGNOSIS — R29.898 WEAKNESS OF LEFT LEG: ICD-10-CM

## 2020-02-04 DIAGNOSIS — M54.42 CHRONIC LEFT-SIDED LOW BACK PAIN WITH LEFT-SIDED SCIATICA: Primary | ICD-10-CM

## 2020-02-04 DIAGNOSIS — M25.60 STIFFNESS IN JOINT: ICD-10-CM

## 2020-02-04 PROCEDURE — 97110 THERAPEUTIC EXERCISES: CPT

## 2020-02-04 PROCEDURE — 97012 MECHANICAL TRACTION THERAPY: CPT

## 2020-02-04 NOTE — PROGRESS NOTES
Physical Therapy Daily Treatment Note     Name: Tamara Barriga  Clinic Number: [id number]    Therapy Diagnosis:   Encounter Diagnoses   Name Primary?    Chronic left-sided low back pain with left-sided sciatica Yes    Stiffness in joint     Posture imbalance     Weakness of left leg      Physician: Vinay Prather MD    Visit Date: 2/4/2020    Physician Orders: PT Eval and Treat   Medical Diagnosis from Referral:   M47.816 (ICD-10-CM) - Lumbar spondylosis   M54.16 (ICD-10-CM) - Lumbar radiculopathy         Evaluation Date: 1/6/2020  Authorization Period Expiration: 1/1/21  Plan of Care Expiration: 2/20/20  Visit # / Visits authorized: 7/ 12     Time In: 10:50a  Time Out: 12:00p  Total Billable Time: 36  minutes    Precautions: Standard    Subjective     Pt reports: her lower back pain and L leg pains are not improved. They were a little better following treatment, but did not last long.   She was compliant with home exercise program.   Response to previous treatment: temporary decrease in pain  Functional change: nothing at this time    Pain: nt/10  Location: bilateral back  and L leg      Objective       Tamara received therapeutic exercises to develop strength, endurance, ROM, flexibility, posture and core stabilization for 36 minutes including:  nustep 6min  LTR with ball 4min  Pelvic tilts  2/15  Side to side pelvic clock in supine 2/15   theraball weight shifting 1/2min  theraball marching 1/2min  Abdominal bracing  With marching lying on 1/2 roll 2/15  R/L clamshells 2/20/green ea  Fire hydrants 2/10 ea    Tamara received the following manual therapy techniques: Manual traction were applied to the:  for 0 minutes, including:      Tamara received the following supervised modalities after being cleared for contradictions: TENS:  Tamara received TENS electrical stimulation for pain to the lumbar spine. Pt received continuous mode at a rate of 150 pps for 0 minutes. Tamara tolerated treatment well without  any adverse effects.     Tamara received hot pack for 0 minutes to lumbar.    Mechanical lumbar traction 30sec on/10 sec off, 55lb/20 lbs x 10 min      Home Exercises Provided and Patient Education Provided     Education provided:   -    Written Home Exercises Provided: Patient instructed to cont prior HEP.  Exercises were reviewed and Tamara was able to demonstrate them prior to the end of the session.  Tamara demonstrated good  understanding of the education provided.     See EMR under Patient Instructions for exercises provided prior visit.    Assessment     Pt presented with improvement with pain reports. Initiated mechanical lumbar traction today, which pt noted did help to reduce pain. Observed less trunk side shift following traction. She did note mild increase in L LE burning pain with supine hip side shifting to  L, thus had her minimize range, which did relieve leg burning. She noted feeling more fatigued following treatment today, thus requested deferring heat and TENS today.          Tamara is progressing well towards her goals.   Pt prognosis is Good.     Pt will continue to benefit from skilled outpatient physical therapy to address the deficits listed in the problem list box on initial evaluation, provide pt/family education and to maximize pt's level of independence in the home and community environment.     Pt's spiritual, cultural and educational needs considered and pt agreeable to plan of care and goals.     Anticipated barriers to physical therapy:     Goals:  Short Term Goals: In 3 weeks   1.I with HEP  2.Pt to increase lumbar ROM to flex 70%, ext 60%, L SB 70%    3.Pt to have pain less than 6/10 at all times.  4.Pt to improve score on the FOTO to less than 45%.  5. Pt tolerated sitting > 30 min without increase in pain        Long Term Goals: In 6 weeks  1.Patient to improve score on the FOTO to >30%.  2. Patient to demo increase in LE strength to 4+/5 to 5/5 grossly  3. Patient to have  decreased pain to 3/10 at worst.  4. Patient to demo increase lumbar ROM to flex 80%, ext 70%, L SB to WFL.  4. Patient to perform daily activities including sitting, walking without limitation.    Plan     Plan of care Certification: 1/6/2020 to 2/20/20.     Outpatient Physical Therapy 2 times weekly for 6 weeks to include the following interventions: Cervical/Lumbar Traction, Electrical Stimulation TENS/IFC, Manual Therapy, Moist Heat/ Ice, Neuromuscular Re-ed, Patient Education and Therapeutic Exercise.     Abdi Sesay, PT

## 2020-02-11 ENCOUNTER — CLINICAL SUPPORT (OUTPATIENT)
Dept: REHABILITATION | Facility: HOSPITAL | Age: 75
End: 2020-02-11
Payer: MEDICARE

## 2020-02-11 DIAGNOSIS — G89.29 CHRONIC LEFT-SIDED LOW BACK PAIN WITH LEFT-SIDED SCIATICA: Primary | ICD-10-CM

## 2020-02-11 DIAGNOSIS — M54.42 CHRONIC LEFT-SIDED LOW BACK PAIN WITH LEFT-SIDED SCIATICA: Primary | ICD-10-CM

## 2020-02-11 DIAGNOSIS — R29.3 POSTURE IMBALANCE: ICD-10-CM

## 2020-02-11 DIAGNOSIS — M25.60 STIFFNESS IN JOINT: ICD-10-CM

## 2020-02-11 DIAGNOSIS — R29.898 WEAKNESS OF LEFT LEG: ICD-10-CM

## 2020-02-11 PROCEDURE — 97012 MECHANICAL TRACTION THERAPY: CPT

## 2020-02-11 PROCEDURE — 97110 THERAPEUTIC EXERCISES: CPT

## 2020-02-11 PROCEDURE — 97014 ELECTRIC STIMULATION THERAPY: CPT

## 2020-02-11 NOTE — PROGRESS NOTES
Physical Therapy Daily Treatment Note     Name: Tamara Barriga  Clinic Number: [id number]    Therapy Diagnosis:   Encounter Diagnoses   Name Primary?    Chronic left-sided low back pain with left-sided sciatica Yes    Stiffness in joint     Posture imbalance     Weakness of left leg      Physician: Vinay Prather MD    Visit Date: 2/11/2020    Physician Orders: PT Eval and Treat   Medical Diagnosis from Referral:   M47.816 (ICD-10-CM) - Lumbar spondylosis   M54.16 (ICD-10-CM) - Lumbar radiculopathy         Evaluation Date: 1/6/2020  Authorization Period Expiration: 1/1/21  Plan of Care Expiration: 2/20/20  Visit # / Visits authorized: 8/ 12     Time In: 10:55a  Time Out: 12:05p  Total Billable Time: 36  minutes    Precautions: Standard    Subjective     Pt reports: her lower back and L LE pain/symptoms are much improved following PT, but it does not take too long before they return to same intensity.    She was compliant with home exercise program.   Response to previous treatment: temporary decrease in pain  Functional change: nothing at this time    Pain: nt/10  Location: bilateral back  and L leg      Objective     Tamara received therapeutic exercises to develop strength, endurance, ROM, flexibility, posture and core stabilization for 36 minutes including:  nustep 6min  LTR with ball 4min  Pelvic tilts  2/15  Side to side pelvic clock in supine 2/15   theraball weight shifting 1/2min  theraball marching 1/2min  Abdominal bracing  With marching lying on 1/2 roll 2/15  R/L clamshells 2/20/green ea  Fire hydrants 2/10 ea    Tamara received the following manual therapy techniques: Manual traction were applied to the:  for 0 minutes, including:      Tamara received the following supervised modalities after being cleared for contradictions: TENS:  Tamara received TENS electrical stimulation for pain to the lumbar spine. Pt received continuous mode at a rate of 150 pps for 10 minutes. Tamara tolerated  treatment well without any adverse effects.     Tamara received hot pack for 10 minutes to lumbar.    Mechanical lumbar traction 30sec on/10 sec off, 55lb/20 lbs x 10 min      Home Exercises Provided and Patient Education Provided     Education provided:   -continue with existing HEP    Written Home Exercises Provided: Patient instructed to cont prior HEP.  Exercises were reviewed and Tamara was able to demonstrate them prior to the end of the session.  Tamara demonstrated good  understanding of the education provided.     See EMR under Patient Instructions for exercises provided prior visit.    Assessment     Pt with no significant change in pain/symptoms upon presenting to PT, but following mechanical lumbar traction she noted significant pain relief. This pain relief continued to progress through therex and modalities. She noted feeling much better following treatment, but she says this is her usual pattern before pains return usually by afternoon. Instructed pt to be aware of how long it takes for pains to return and if pains are same in L LE and same in intensity.          Tamara is progressing well towards her goals.   Pt prognosis is Good.     Pt will continue to benefit from skilled outpatient physical therapy to address the deficits listed in the problem list box on initial evaluation, provide pt/family education and to maximize pt's level of independence in the home and community environment.     Pt's spiritual, cultural and educational needs considered and pt agreeable to plan of care and goals.     Anticipated barriers to physical therapy:     Goals:  Short Term Goals: In 3 weeks   1.I with HEP  2.Pt to increase lumbar ROM to flex 70%, ext 60%, L SB 70%    3.Pt to have pain less than 6/10 at all times.  4.Pt to improve score on the FOTO to less than 45%.  5. Pt tolerated sitting > 30 min without increase in pain        Long Term Goals: In 6 weeks  1.Patient to improve score on the FOTO to >30%.  2.  Patient to demo increase in LE strength to 4+/5 to 5/5 grossly  3. Patient to have decreased pain to 3/10 at worst.  4. Patient to demo increase lumbar ROM to flex 80%, ext 70%, L SB to WFL.  4. Patient to perform daily activities including sitting, walking without limitation.    Plan     Plan of care Certification: 1/6/2020 to 2/20/20.     Outpatient Physical Therapy 2 times weekly for 6 weeks to include the following interventions: Cervical/Lumbar Traction, Electrical Stimulation TENS/IFC, Manual Therapy, Moist Heat/ Ice, Neuromuscular Re-ed, Patient Education and Therapeutic Exercise.     Abdi Sesay, PT

## 2020-02-14 DIAGNOSIS — I10 ESSENTIAL HYPERTENSION: ICD-10-CM

## 2020-02-14 RX ORDER — INSULIN ASPART 100 [IU]/ML
INJECTION, SOLUTION INTRAVENOUS; SUBCUTANEOUS
Qty: 9 BOX | Refills: 0 | Status: SHIPPED | OUTPATIENT
Start: 2020-02-14 | End: 2020-03-03 | Stop reason: SDUPTHER

## 2020-02-14 RX ORDER — AMLODIPINE BESYLATE 5 MG/1
TABLET ORAL
Qty: 90 TABLET | Refills: 0 | Status: SHIPPED | OUTPATIENT
Start: 2020-02-14 | End: 2020-05-20 | Stop reason: SDUPTHER

## 2020-02-21 ENCOUNTER — CLINICAL SUPPORT (OUTPATIENT)
Dept: REHABILITATION | Facility: HOSPITAL | Age: 75
End: 2020-02-21
Payer: MEDICARE

## 2020-02-21 ENCOUNTER — DOCUMENTATION ONLY (OUTPATIENT)
Dept: REHABILITATION | Facility: HOSPITAL | Age: 75
End: 2020-02-21

## 2020-02-21 DIAGNOSIS — R29.3 POSTURE IMBALANCE: ICD-10-CM

## 2020-02-21 DIAGNOSIS — M25.60 STIFFNESS IN JOINT: ICD-10-CM

## 2020-02-21 DIAGNOSIS — G89.29 CHRONIC LEFT-SIDED LOW BACK PAIN WITH LEFT-SIDED SCIATICA: ICD-10-CM

## 2020-02-21 DIAGNOSIS — R29.898 WEAKNESS OF LEFT LEG: ICD-10-CM

## 2020-02-21 DIAGNOSIS — M54.42 CHRONIC LEFT-SIDED LOW BACK PAIN WITH LEFT-SIDED SCIATICA: ICD-10-CM

## 2020-02-21 PROCEDURE — 97110 THERAPEUTIC EXERCISES: CPT | Mod: CQ

## 2020-02-21 PROCEDURE — 97012 MECHANICAL TRACTION THERAPY: CPT | Mod: CQ

## 2020-02-21 PROCEDURE — 97014 ELECTRIC STIMULATION THERAPY: CPT | Mod: CQ

## 2020-02-21 NOTE — PLAN OF CARE
Outpatient Therapy Updated Plan of Care     Visit Date: 2/21/2020  Name: Tamara Barriga  Clinic Number: [id number]    Therapy Diagnosis:   Encounter Diagnoses   Name Primary?    Chronic left-sided low back pain with left-sided sciatica     Stiffness in joint     Posture imbalance     Weakness of left leg      Physician: Vinay Prather MD    Visit Date: 2/21/2020     Physician Orders: PT Eval and Treat   Medical Diagnosis from Referral:   M47.816 (ICD-10-CM) - Lumbar spondylosis   M54.16 (ICD-10-CM) - Lumbar radiculopathy         Evaluation Date: 1/6/2020  Authorization Period Expiration: 1/1/21  Plan of Care Expiration: 2/20/20  Visit # / Visits authorized: 9/ 12    Cancelled Visits/No Show Visits: few    Current Certification Period:  1/20/20 to 2/20/20  Precautions:  standard  Visits from Evaluation Date:  9  Functional Level Prior to Evaluation:  Limited mobility tolerance due to chronic LBP, L LE pain         Subjective      Pt reports: her low back and LLE always hurts. She said pain radiates down towards her knee and generally is relieved for the rest of the day after therapy. She takes Aleve before going to PT. She said she didn't come last visit because she was in so much pain. She notes overall PT has helped immediately following treatment, but same intensity returns later that day or the next.  She was compliant with home exercise program.   Response to previous treatment: temporary decrease in pain  Functional change: decreased pain after traction     Pain: 7/10  Location: bilateral back  and L leg       Objective      Posture/Structure: Pt presents with FHP, rounded sh's, thoracic kyphosis, and a mild decrease in LB lordosis.  Mod R trunk side shift observed, but this does reduce following mechanical traction.      Gait: WNL     Neuro/Sensation:   Sensation: mild decrease sensation to light touch L L4     Balance: SL R=GOOD, L=GOOD     L/sp AROM:       % Pain Present (Y/N)   FB 70 y   RSB 85 n    LSB 55 y   BB 50 y   RRot       LRot          Hip AROM: B hips/LE grossly WFL                                                                          L          R  Strength:                    Hip flexors                   4/5       5/5  Quadriceps                  4+/5     5/5                                             Hamstrings                  4+/5     5/5                                      PF                                4+/5     5/5                                      DF                               5/5       5/5                                      Great toe extension    4/5       4+/5                                           Special Test: Slump Test:                 Pos L>R                      Distraction:                  Pos for relief                          SLR Test:                    Pos L>R                      Quadrant:                    Pos L                                                      PIVM Lumbar/Thoracic: hypomobility lumbar spine without pain with spring testing                            Palpation: tight lumbar paraspinals     Home Exercises Provided and Patient Education Provided      See EMR under Patient Instructions for exercises provided prior visit.     Assessment      Pt noted having increased L LE pain with therex today, but a significant reduction noted after mechanical lumbar traction, which is very consistent in relieving pain, although temporarily. Pt. Is still lacking lumbar and core stability with some exercises. Pt has not progressed much with long term pain relief. Recommend continued PT for continued strengthening/stabilization. If no further improvements over next 3 visits we will recommend further MD assessment.    Pt prognosis is Good.      Pt will continue to benefit from skilled outpatient physical therapy to address the deficits listed in the problem list box on initial evaluation, provide pt/family education and to maximize pt's level of  independence in the home and community environment.      Pt's spiritual, cultural and educational needs considered and pt agreeable to plan of care and goals.     · Anticipated barriers to physical therapy: chronic nature of dysfunction, DM     Goals:  Short Term Goals: In 3 weeks   1.I with HEP-met  2.Pt to increase lumbar ROM to flex 70%, ext 60%, L SB 70% -progressing  3.Pt to have pain less than 6/10 at all times.-temporary progress noted  4.Pt to improve score on the FOTO to less than 45%.  5. Pt tolerated sitting > 30 min without increase in pain-minimal progress        Long Term Goals: In 6 weeks  1.Patient to improve score on the FOTO to >30%.  2. Patient to demo increase in LE strength to 4+/5 to 5/5 grossly-progressing  3. Patient to have decreased pain to 3/10 at worst.-minimal progress  4. Patient to demo increase lumbar ROM to flex 80%, ext 70%, L SB to WFL.-minimal  5. Patient to perform daily activities including sitting, walking without limitation.-minimal     Long Term Goal Status:   continue per initial plan of care.  Reasons for Recertification of Therapy:   Continue with core strengthening/stabilization     Plan     Updated Certification Period: 2/21/2020 to 3/6/20  Recommended Treatment Plan: 1-2 times per week for 3 weeks: Cervical/Lumbar Traction, Electrical Stimulation TENS/IFC, Manual Therapy, Moist Heat/ Ice, Neuromuscular Re-ed, Patient Education and Therapeutic Exercise  Other Recommendations: none at this time    Abdi Sesay, PT  2/21/2020      I CERTIFY THE NEED FOR THESE SERVICES FURNISHED UNDER THIS PLAN OF TREATMENT AND WHILE UNDER MY CARE    Physician's comments:        Physician's Signature: ___________________________________________________

## 2020-02-21 NOTE — PROGRESS NOTES
PT/PTA met face to face to discuss pt's treatment plan and progress towards established goals. Pt will be seen by a physical therapist minimally every 6th visit or every 30 days.    Please see Updated Plan of Care dated 2/21/20 for changes and updated goals.    Keli Leiva, PTA

## 2020-02-21 NOTE — PROGRESS NOTES
Physical Therapy Assistant Daily Treatment Note     Name: Tamara Barriga  Clinic Number: [id number]    Therapy Diagnosis:   Encounter Diagnoses   Name Primary?    Chronic left-sided low back pain with left-sided sciatica     Stiffness in joint     Posture imbalance     Weakness of left leg      Physician: Vinay Prather MD    Visit Date: 2/21/2020    Physician Orders: PT Eval and Treat   Medical Diagnosis from Referral:   M47.816 (ICD-10-CM) - Lumbar spondylosis   M54.16 (ICD-10-CM) - Lumbar radiculopathy         Evaluation Date: 1/6/2020  Authorization Period Expiration: 1/1/21  Plan of Care Expiration: 2/20/20  Visit # / Visits authorized: 9/ 12     Time In: 10:45  Time Out: 12:15  Total Billable Time: 36 minutes    Precautions: Standard    Subjective     Pt reports: her low back and LLE always hurts. She said pain radiates down towards her knee and generally is relieved for the rest of the day after therapy. She takes Aleve before going to . She said she didn't come last visit because she was in so much pain.  She was compliant with home exercise program.   Response to previous treatment: temporary decrease in pain  Functional change: decreased pain after traction    Pain: 8/10  Location: bilateral back  and L leg      Objective     Tamara received therapeutic exercises to develop strength, endurance, ROM, flexibility, posture and core stabilization for 36 minutes including:  nustep 6min  LTR with ball 4min  Pelvic tilts  2/15  Side to side pelvic clock in supine 2/15   theraball weight shifting 1/2min  theraball marching 1/2min  Abdominal bracing  With marching lying on 1/2 roll 2/15  R/L clamshells 2/20/green ea  Fire hydrants 2/10 ea    Tamara received the following supervised modalities after being cleared for contradictions: TENS:  Tamara received TENS electrical stimulation for pain to the lumbar spine. Pt received continuous mode at a rate of 150 pps for 10 minutes. Tamara tolerated treatment  well without any adverse effects.     Tamara received hot pack for 10 minutes to lumbar.    Mechanical lumbar traction 30sec on/10 sec off, 55lb/20 lbs x 10 min      Home Exercises Provided and Patient Education Provided     Education provided:   -continue with existing HEP    Written Home Exercises Provided: Patient instructed to cont prior HEP.  Exercises were reviewed and Tamara was able to demonstrate them prior to the end of the session.  Tamara demonstrated good  understanding of the education provided.     See EMR under Patient Instructions for exercises provided prior visit.    Assessment     Pt. Tolerated therapy session fairly well although she had constant radiating pain in LLE throughout session. Pt. Is still lacking lumbar and core stability with some exercises. She has decreased low back symptoms with compression of traction straps. Pt. Has temporary relief in symptoms after each session.        Tamara is progressing well towards her goals.   Pt prognosis is Good.     Pt will continue to benefit from skilled outpatient physical therapy to address the deficits listed in the problem list box on initial evaluation, provide pt/family education and to maximize pt's level of independence in the home and community environment.     Pt's spiritual, cultural and educational needs considered and pt agreeable to plan of care and goals.     Anticipated barriers to physical therapy: chronic nature of dysfunction, DM    Goals:  Short Term Goals: In 3 weeks   1.I with HEP  2.Pt to increase lumbar ROM to flex 70%, ext 60%, L SB 70%    3.Pt to have pain less than 6/10 at all times.  4.Pt to improve score on the FOTO to less than 45%.  5. Pt tolerated sitting > 30 min without increase in pain        Long Term Goals: In 6 weeks  1.Patient to improve score on the FOTO to >30%.  2. Patient to demo increase in LE strength to 4+/5 to 5/5 grossly  3. Patient to have decreased pain to 3/10 at worst.  4. Patient to demo increase  lumbar ROM to flex 80%, ext 70%, L SB to WFL.  4. Patient to perform daily activities including sitting, walking without limitation.    Plan   Traction prior to exercises    Plan of care Certification: 1/6/2020 to 2/20/20.     Outpatient Physical Therapy 2 times weekly for 6 weeks to include the following interventions: Cervical/Lumbar Traction, Electrical Stimulation TENS/IFC, Manual Therapy, Moist Heat/ Ice, Neuromuscular Re-ed, Patient Education and Therapeutic Exercise.     Keli Leiva, PTA

## 2020-02-25 ENCOUNTER — CLINICAL SUPPORT (OUTPATIENT)
Dept: REHABILITATION | Facility: HOSPITAL | Age: 75
End: 2020-02-25
Payer: MEDICARE

## 2020-02-25 DIAGNOSIS — G89.29 CHRONIC LEFT-SIDED LOW BACK PAIN WITH LEFT-SIDED SCIATICA: ICD-10-CM

## 2020-02-25 DIAGNOSIS — M25.60 STIFFNESS IN JOINT: ICD-10-CM

## 2020-02-25 DIAGNOSIS — R29.3 POSTURE IMBALANCE: ICD-10-CM

## 2020-02-25 DIAGNOSIS — M54.42 CHRONIC LEFT-SIDED LOW BACK PAIN WITH LEFT-SIDED SCIATICA: ICD-10-CM

## 2020-02-25 DIAGNOSIS — R29.898 WEAKNESS OF LEFT LEG: ICD-10-CM

## 2020-02-25 PROCEDURE — 97012 MECHANICAL TRACTION THERAPY: CPT | Mod: CQ

## 2020-02-25 PROCEDURE — 97014 ELECTRIC STIMULATION THERAPY: CPT | Mod: CQ

## 2020-02-25 PROCEDURE — 97110 THERAPEUTIC EXERCISES: CPT | Mod: CQ

## 2020-02-25 NOTE — PROGRESS NOTES
Physical Therapy Assistant Daily Treatment Note     Name: Tamara Barriga  Clinic Number: [id number]    Therapy Diagnosis:   Encounter Diagnoses   Name Primary?    Chronic left-sided low back pain with left-sided sciatica     Stiffness in joint     Posture imbalance     Weakness of left leg      Physician: Vinay Prather MD    Visit Date: 2/25/2020    Physician Orders: PT Eval and Treat   Medical Diagnosis from Referral:   M47.816 (ICD-10-CM) - Lumbar spondylosis   M54.16 (ICD-10-CM) - Lumbar radiculopathy         Evaluation Date: 1/6/2020  Authorization Period Expiration: 1/1/21  Plan of Care Expiration: 3/6/20  Visit # / Visits authorized: 10/ 12     Time In: 11:05   Time Out: 12:05  Total Billable Time: 36 minutes    Precautions: Standard    Subjective     Pt reports: she is having continued low back pain and radiating pain in LLE.  She was compliant with home exercise program.   Response to previous treatment: temporary decrease in pain  Functional change: decreased pain during exercises with lumbar traction fist    Pain: 8/10 After therapy: 5/10  Location: bilateral back  and L leg      Objective     Tamara received therapeutic exercises to develop strength, endurance, ROM, flexibility, posture and core stabilization for 36 minutes including:  nustep 6min  LTR with ball 4min  Pelvic tilts  2/15  Side to side pelvic clock in supine 2/15 -not today  theraball weight shifting 1/2min  theraball marching 1/2min  Abdominal bracing  With marching lying on 1/2 roll 2/15  R/L clamshells 2/20/green ea  Fire hydrants 2/10 ea    Tamara received the following supervised modalities after being cleared for contradictions: TENS:  Tamara received TENS electrical stimulation for pain to the lumbar spine. Pt received continuous mode at a rate of 150 pps for 10 minutes. Tamara tolerated treatment well without any adverse effects.     Tamara received hot pack for 10 minutes to lumbar.    Mechanical lumbar traction 30sec  on/10 sec off, 60lb/20 lbs x 10 min      Home Exercises Provided and Patient Education Provided     Education provided:   -continue with existing HEP    Written Home Exercises Provided: Patient instructed to cont prior HEP.  Exercises were reviewed and Tamara was able to demonstrate them prior to the end of the session.  Tamara demonstrated good  understanding of the education provided.     See EMR under Patient Instructions for exercises provided prior visit.    Assessment     Pt. Continues to have decreased radicular symptoms after mechanical traction. She reports decrease of pain from 8/10 to 5/10. Pt. Has radicular symptoms with side pelvic clock exercise today, discontinued exercise to prevent increased pain. She has better tolerance to traction prior to exercises.        Tamara is progressing well towards her goals.   Pt prognosis is Good.     Pt will continue to benefit from skilled outpatient physical therapy to address the deficits listed in the problem list box on initial evaluation, provide pt/family education and to maximize pt's level of independence in the home and community environment.     Pt's spiritual, cultural and educational needs considered and pt agreeable to plan of care and goals.     Anticipated barriers to physical therapy: chronic nature of dysfunction, DM    Goals:  Short Term Goals: In 3 weeks   1.I with HEP met  2.Pt to increase lumbar ROM to flex 70%, ext 60%, L SB 70%    3.Pt to have pain less than 6/10 at all times. progressing  4.Pt to improve score on the FOTO to less than 45%.  5. Pt tolerated sitting > 30 min without increase in pain        Long Term Goals: In 6 weeks  1.Patient to improve score on the FOTO to >30%.  2. Patient to demo increase in LE strength to 4+/5 to 5/5 grossly  3. Patient to have decreased pain to 3/10 at worst.  4. Patient to demo increase lumbar ROM to flex 80%, ext 70%, L SB to WFL.  4. Patient to perform daily activities including sitting, walking  without limitation.    Plan     Plan of care Certification: 2/21/2020 to 3/6/20     Outpatient Physical Therapy 2 times weekly for 6 weeks to include the following interventions: Cervical/Lumbar Traction, Electrical Stimulation TENS/IFC, Manual Therapy, Moist Heat/ Ice, Neuromuscular Re-ed, Patient Education and Therapeutic Exercise.     Keli Leiva, PTA

## 2020-03-03 ENCOUNTER — LAB VISIT (OUTPATIENT)
Dept: LAB | Facility: HOSPITAL | Age: 75
End: 2020-03-03
Attending: FAMILY MEDICINE
Payer: MEDICARE

## 2020-03-03 ENCOUNTER — PATIENT OUTREACH (OUTPATIENT)
Dept: ADMINISTRATIVE | Facility: OTHER | Age: 75
End: 2020-03-03

## 2020-03-03 ENCOUNTER — OFFICE VISIT (OUTPATIENT)
Dept: FAMILY MEDICINE | Facility: CLINIC | Age: 75
End: 2020-03-03
Attending: FAMILY MEDICINE
Payer: MEDICARE

## 2020-03-03 VITALS
SYSTOLIC BLOOD PRESSURE: 122 MMHG | DIASTOLIC BLOOD PRESSURE: 82 MMHG | BODY MASS INDEX: 34.15 KG/M2 | TEMPERATURE: 99 F | HEIGHT: 66 IN | WEIGHT: 212.5 LBS | OXYGEN SATURATION: 96 % | HEART RATE: 97 BPM

## 2020-03-03 DIAGNOSIS — F32.5 MAJOR DEPRESSIVE DISORDER WITH SINGLE EPISODE, IN FULL REMISSION: ICD-10-CM

## 2020-03-03 DIAGNOSIS — M85.80 OSTEOPENIA, UNSPECIFIED LOCATION: ICD-10-CM

## 2020-03-03 DIAGNOSIS — N18.30 CKD (CHRONIC KIDNEY DISEASE) STAGE 3, GFR 30-59 ML/MIN: ICD-10-CM

## 2020-03-03 DIAGNOSIS — E78.5 DM TYPE 2 WITH DIABETIC DYSLIPIDEMIA: ICD-10-CM

## 2020-03-03 DIAGNOSIS — I10 HYPERTENSION, UNSPECIFIED TYPE: ICD-10-CM

## 2020-03-03 DIAGNOSIS — E11.69 DM TYPE 2 WITH DIABETIC DYSLIPIDEMIA: ICD-10-CM

## 2020-03-03 DIAGNOSIS — Z12.39 BREAST CANCER SCREENING: ICD-10-CM

## 2020-03-03 DIAGNOSIS — E11.69 DM TYPE 2 WITH DIABETIC DYSLIPIDEMIA: Primary | ICD-10-CM

## 2020-03-03 DIAGNOSIS — E78.5 DM TYPE 2 WITH DIABETIC DYSLIPIDEMIA: Primary | ICD-10-CM

## 2020-03-03 DIAGNOSIS — Z12.31 ENCOUNTER FOR SCREENING MAMMOGRAM FOR MALIGNANT NEOPLASM OF BREAST: ICD-10-CM

## 2020-03-03 LAB
BASOPHILS # BLD AUTO: 0.05 K/UL (ref 0–0.2)
BASOPHILS NFR BLD: 0.5 % (ref 0–1.9)
DIFFERENTIAL METHOD: NORMAL
EOSINOPHIL # BLD AUTO: 0.1 K/UL (ref 0–0.5)
EOSINOPHIL NFR BLD: 1 % (ref 0–8)
ERYTHROCYTE [DISTWIDTH] IN BLOOD BY AUTOMATED COUNT: 12.9 % (ref 11.5–14.5)
HCT VFR BLD AUTO: 39.6 % (ref 37–48.5)
HGB BLD-MCNC: 12.8 G/DL (ref 12–16)
IMM GRANULOCYTES # BLD AUTO: 0.03 K/UL (ref 0–0.04)
IMM GRANULOCYTES NFR BLD AUTO: 0.3 % (ref 0–0.5)
LYMPHOCYTES # BLD AUTO: 3 K/UL (ref 1–4.8)
LYMPHOCYTES NFR BLD: 29.5 % (ref 18–48)
MCH RBC QN AUTO: 29.7 PG (ref 27–31)
MCHC RBC AUTO-ENTMCNC: 32.3 G/DL (ref 32–36)
MCV RBC AUTO: 92 FL (ref 82–98)
MONOCYTES # BLD AUTO: 0.7 K/UL (ref 0.3–1)
MONOCYTES NFR BLD: 6.8 % (ref 4–15)
NEUTROPHILS # BLD AUTO: 6.3 K/UL (ref 1.8–7.7)
NEUTROPHILS NFR BLD: 61.9 % (ref 38–73)
NRBC BLD-RTO: 0 /100 WBC
PLATELET # BLD AUTO: 244 K/UL (ref 150–350)
PMV BLD AUTO: 11.7 FL (ref 9.2–12.9)
RBC # BLD AUTO: 4.31 M/UL (ref 4–5.4)
WBC # BLD AUTO: 10.22 K/UL (ref 3.9–12.7)

## 2020-03-03 PROCEDURE — 36415 COLL VENOUS BLD VENIPUNCTURE: CPT | Mod: PO

## 2020-03-03 PROCEDURE — 83036 HEMOGLOBIN GLYCOSYLATED A1C: CPT

## 2020-03-03 PROCEDURE — 99214 OFFICE O/P EST MOD 30 MIN: CPT | Mod: S$PBB,,, | Performed by: FAMILY MEDICINE

## 2020-03-03 PROCEDURE — 85025 COMPLETE CBC W/AUTO DIFF WBC: CPT

## 2020-03-03 PROCEDURE — 80061 LIPID PANEL: CPT

## 2020-03-03 PROCEDURE — 80053 COMPREHEN METABOLIC PANEL: CPT

## 2020-03-03 PROCEDURE — 82306 VITAMIN D 25 HYDROXY: CPT

## 2020-03-03 PROCEDURE — 99214 PR OFFICE/OUTPT VISIT, EST, LEVL IV, 30-39 MIN: ICD-10-PCS | Mod: S$PBB,,, | Performed by: FAMILY MEDICINE

## 2020-03-03 PROCEDURE — 82043 UR ALBUMIN QUANTITATIVE: CPT

## 2020-03-03 PROCEDURE — 99213 OFFICE O/P EST LOW 20 MIN: CPT | Mod: PBBFAC,PO | Performed by: FAMILY MEDICINE

## 2020-03-03 PROCEDURE — 99999 PR PBB SHADOW E&M-EST. PATIENT-LVL III: ICD-10-PCS | Mod: PBBFAC,,, | Performed by: FAMILY MEDICINE

## 2020-03-03 PROCEDURE — 99999 PR PBB SHADOW E&M-EST. PATIENT-LVL III: CPT | Mod: PBBFAC,,, | Performed by: FAMILY MEDICINE

## 2020-03-03 RX ORDER — ERGOCALCIFEROL 1.25 MG/1
CAPSULE ORAL
Qty: 12 CAPSULE | Refills: 0 | Status: SHIPPED | OUTPATIENT
Start: 2020-03-03

## 2020-03-03 RX ORDER — INSULIN ASPART 100 [IU]/ML
INJECTION, SOLUTION INTRAVENOUS; SUBCUTANEOUS
Qty: 9 BOX | Refills: 0 | Status: SHIPPED | OUTPATIENT
Start: 2020-03-03 | End: 2020-05-20 | Stop reason: SDUPTHER

## 2020-03-03 NOTE — PROGRESS NOTES
Subjective:       Patient ID: Tamara Barriga is a 75 y.o. female.    Chief Complaint: Medication Refill and Back Pain    75 y old female  here for f/u on t2DM, htn , stage 3 ckd , dlp, depression and o penia .   Exercises : pt for Lumbar ddd , Ophthalmology : seen By Dr Farfan last fall  , Dina bs are: 160  . Using insulin and victoza as prescribed. Unsure whether she is taking  Invokana or not  . No falls , no fractures. Stable depression     Review of Systems   Constitutional: Negative.    HENT: Negative.    Eyes: Negative.    Respiratory: Negative.    Cardiovascular: Negative.    Gastrointestinal: Negative.    Genitourinary: Negative.    Musculoskeletal: Positive for arthralgias.   Skin: Negative.    Hematological: Negative.        Objective:      Physical Exam   Constitutional: She is oriented to person, place, and time. No distress.   HENT:   Head: Normocephalic and atraumatic.   Right Ear: External ear normal.   Left Ear: External ear normal.   Mouth/Throat: No oropharyngeal exudate.   Eyes: Pupils are equal, round, and reactive to light. Conjunctivae and EOM are normal. Right eye exhibits no discharge. Left eye exhibits no discharge. No scleral icterus.   Neck: Normal range of motion. Neck supple. No JVD present. No tracheal deviation present. No thyromegaly present.   Cardiovascular: Normal rate, regular rhythm and normal heart sounds. Exam reveals no gallop and no friction rub.   No murmur heard.  Pulmonary/Chest: Effort normal and breath sounds normal. No stridor. No respiratory distress. She has no wheezes. She has no rales. She exhibits no tenderness.   Abdominal: Soft. Bowel sounds are normal. She exhibits no distension. There is no tenderness. There is no rebound and no guarding.   Musculoskeletal: Normal range of motion.   Lymphadenopathy:     She has no cervical adenopathy.   Neurological: She is alert and oriented to person, place, and time.   Skin: Skin is warm and dry. She is not  diaphoretic.   Psychiatric: She has a normal mood and affect. Her behavior is normal. Judgment and thought content normal.       Assessment:      Tamara was seen today for medication refill and back pain.    Diagnoses and all orders for this visit:    DM type 2 with diabetic dyslipidemia  -     CBC auto differential; Future  -     Comprehensive metabolic panel; Future  -     Lipid panel; Future  -     Microalbumin/creatinine urine ratio  -     Hemoglobin A1c; Future    Hypertension, unspecified type  -     CBC auto differential; Future  -     Comprehensive metabolic panel; Future  -     Lipid panel; Future  -     Microalbumin/creatinine urine ratio  -     Hemoglobin A1c; Future    Osteopenia, unspecified location  -     Vitamin D; Future    Breast cancer screening  -     Mammo Digital Screening Bilateral With CAD; Future    Encounter for screening mammogram for malignant neoplasm of breast   -     Mammo Digital Screening Bilateral With CAD; Future    CKD (chronic kidney disease) stage 3, GFR 30-59 ml/min    Major depressive disorder with single episode, in full remission    Other orders  -     ergocalciferol (VITAMIN D2) 50,000 unit Cap; TAKE ONE CAPSULE BY MOUTH ONE TIME PER WEEK  -     insulin aspart U-100 (NOVOLOG FLEXPEN U-100 INSULIN) 100 unit/mL (3 mL) InPn pen; 3 units sq  before breakfast ,25 units sq  before lunch, 3 units sq  before dinner  -     insulin detemir U-100 (LEVEMIR FLEXTOUCH U-100 INSULN) 100 unit/mL (3 mL) SubQ InPn pen; INJECT 75 UNITS UNDER THE SKIN TWICE DAILY      Plan:   Diet and ex   Controlled. Cont med   DEXA on 12/20   Avoid nsaids   Stable . Cont med

## 2020-03-04 LAB
25(OH)D3+25(OH)D2 SERPL-MCNC: 24 NG/ML (ref 30–96)
ALBUMIN SERPL BCP-MCNC: 3.9 G/DL (ref 3.5–5.2)
ALBUMIN/CREAT UR: 33.3 UG/MG (ref 0–30)
ALP SERPL-CCNC: 87 U/L (ref 55–135)
ALT SERPL W/O P-5'-P-CCNC: 14 U/L (ref 10–44)
ANION GAP SERPL CALC-SCNC: 10 MMOL/L (ref 8–16)
AST SERPL-CCNC: 14 U/L (ref 10–40)
BILIRUB SERPL-MCNC: 0.5 MG/DL (ref 0.1–1)
BUN SERPL-MCNC: 20 MG/DL (ref 8–23)
CALCIUM SERPL-MCNC: 9.8 MG/DL (ref 8.7–10.5)
CHLORIDE SERPL-SCNC: 104 MMOL/L (ref 95–110)
CHOLEST SERPL-MCNC: 182 MG/DL (ref 120–199)
CHOLEST/HDLC SERPL: 4.2 {RATIO} (ref 2–5)
CO2 SERPL-SCNC: 26 MMOL/L (ref 23–29)
CREAT SERPL-MCNC: 1.1 MG/DL (ref 0.5–1.4)
CREAT UR-MCNC: 192 MG/DL (ref 15–325)
EST. GFR  (AFRICAN AMERICAN): 57.6 ML/MIN/1.73 M^2
EST. GFR  (NON AFRICAN AMERICAN): 49.9 ML/MIN/1.73 M^2
ESTIMATED AVG GLUCOSE: 203 MG/DL (ref 68–131)
GLUCOSE SERPL-MCNC: 198 MG/DL (ref 70–110)
HBA1C MFR BLD HPLC: 8.7 % (ref 4–5.6)
HDLC SERPL-MCNC: 43 MG/DL (ref 40–75)
HDLC SERPL: 23.6 % (ref 20–50)
LDLC SERPL CALC-MCNC: 68.4 MG/DL (ref 63–159)
MICROALBUMIN UR DL<=1MG/L-MCNC: 64 UG/ML
NONHDLC SERPL-MCNC: 139 MG/DL
POTASSIUM SERPL-SCNC: 3.8 MMOL/L (ref 3.5–5.1)
PROT SERPL-MCNC: 7.1 G/DL (ref 6–8.4)
SODIUM SERPL-SCNC: 140 MMOL/L (ref 136–145)
TRIGL SERPL-MCNC: 353 MG/DL (ref 30–150)

## 2020-03-05 ENCOUNTER — OFFICE VISIT (OUTPATIENT)
Dept: PAIN MEDICINE | Facility: CLINIC | Age: 75
End: 2020-03-05
Payer: MEDICARE

## 2020-03-05 VITALS
WEIGHT: 212.5 LBS | DIASTOLIC BLOOD PRESSURE: 96 MMHG | BODY MASS INDEX: 34.15 KG/M2 | SYSTOLIC BLOOD PRESSURE: 174 MMHG | HEART RATE: 83 BPM | HEIGHT: 66 IN

## 2020-03-05 DIAGNOSIS — M47.816 LUMBAR SPONDYLOSIS: Primary | ICD-10-CM

## 2020-03-05 DIAGNOSIS — M54.16 LUMBAR RADICULOPATHY: ICD-10-CM

## 2020-03-05 PROCEDURE — 99214 PR OFFICE/OUTPT VISIT, EST, LEVL IV, 30-39 MIN: ICD-10-PCS | Mod: S$PBB,,, | Performed by: PAIN MEDICINE

## 2020-03-05 PROCEDURE — 99999 PR PBB SHADOW E&M-EST. PATIENT-LVL IV: CPT | Mod: PBBFAC,,, | Performed by: PAIN MEDICINE

## 2020-03-05 PROCEDURE — 99999 PR PBB SHADOW E&M-EST. PATIENT-LVL IV: ICD-10-PCS | Mod: PBBFAC,,, | Performed by: PAIN MEDICINE

## 2020-03-05 PROCEDURE — 99214 OFFICE O/P EST MOD 30 MIN: CPT | Mod: S$PBB,,, | Performed by: PAIN MEDICINE

## 2020-03-05 PROCEDURE — 99214 OFFICE O/P EST MOD 30 MIN: CPT | Mod: PBBFAC | Performed by: PAIN MEDICINE

## 2020-03-05 RX ORDER — HYDROCODONE BITARTRATE AND ACETAMINOPHEN 5; 325 MG/1; MG/1
1 TABLET ORAL EVERY 12 HOURS PRN
Qty: 60 TABLET | Refills: 0 | Status: SHIPPED | OUTPATIENT
Start: 2020-03-05 | End: 2020-03-27 | Stop reason: SDUPTHER

## 2020-03-05 NOTE — PATIENT INSTRUCTIONS
-obtain lumbar MRI  -will schedule for left L4 and L5 transforaminal epidural steroid injections  -provided short course of Lortab 5 mg tablets to last twice daily for approximately 30 days as a 1 time prescription  -will increase gabapentin 300 mg 3 times daily  -continue working physical therapy  -follow up in clinic 6 weeks after the injection

## 2020-03-05 NOTE — PROGRESS NOTES
Chief Pain Complaint:  Low back and left leg pain    This note was created using voice recognition, there may be errors that were missed during proofreading.    History of Present Illness:   Ms. Barriga returns to clinic for follow-up.  Her last visit she was started physical therapy and provided a Medrol Dosepak in addition turmeric.  Since then, she reports that she continues to have significant pain located in the low back which radiates into the left leg distally into the foot.  She has been taking 4-5 Advil tablets several times per day and has been instructed by her primary care physician recently to stop taking this much of the medication is a can have a negative affect on her kidneys.  She has been in physical therapy which she does find to be somewhat helpful.  She has had for 5 surgeries in the back and she has felt better after each surgery.  She did take this steroid of Dosepak which she did find to be helpful.  She denies having any symptoms in the right lower extremity.  She has numbness weakness and tingling in the left leg in addition to a burning sensation which radiates in the left anterior thigh to the knee and in the posterior thigh to the mid posterior and lateral calf.  She takes gabapentin 300 mg twice daily and has undergone epidural steroid injection in the past.    Initial HPI:  This patient is a 75 y.o. female who presents today complaining of the above noted pain/s. The patient describes the pain as follows.  Ms. Barriga   His new patient clinic with complaints of low back pain.  She has had low back pain off and on for several years and ports undergoing 4 surgeries on her lumbar spine which have included disc surgeries in addition to bone spur surgeries however she denies ever having had a fusion.  She picked up a suitcase approximately 2 months ago which caused her to have sharp pain in her low back and radiate into her left lower extremity to the top of her foot.  She also has  poorly-controlled diabetes and neuropathy in her bilateral feet.  She describes mostly a throbbing aching sensation which is worse with walking and sitting improved with heating pad.  She also takes ibuprofen approximately 9 tablets per day which does provide minimal pain relief.  She denies having bowel bladder difficulty.  She also takes gabapentin, Cymbalta which she does find to be helpful.  She does endorse having numbness weakness in the left leg but denies having symptoms in the right leg.  She has completed physical therapy the past however not recently.  Previous Therapy:  Medications:  Gabapentin, Cymbalta, Voltaren, ibuprofen, turmeric, Medrol Dosepak  Injections:  Epidural steroid injection  Surgeries:  Multiple lumbar surgeries  Physical Therapy: Completed in the Past    Past Surgical History:   Procedure Laterality Date    BACK SURGERY      BREAST CYST EXCISION Bilateral     2007    CATARACT EXTRACTION W/  INTRAOCULAR LENS IMPLANT Right 08/15/2018    CATARACT EXTRACTION W/  INTRAOCULAR LENS IMPLANT Left 01/02/2019    CHOLECYSTECTOMY      HYSTERECTOMY      LIVER BIOPSY      OOPHORECTOMY      TOTAL REDUCTION MAMMOPLASTY Bilateral 2007       Family History   Problem Relation Age of Onset    Cancer Daughter         Breast Ca     Breast cancer Daughter     Breast cancer Sister        Social History     Socioeconomic History    Marital status:      Spouse name: Not on file    Number of children: Not on file    Years of education: Not on file    Highest education level: Not on file   Occupational History    Not on file   Social Needs    Financial resource strain: Not on file    Food insecurity:     Worry: Not on file     Inability: Not on file    Transportation needs:     Medical: Not on file     Non-medical: Not on file   Tobacco Use    Smoking status: Never Smoker    Smokeless tobacco: Never Used   Substance and Sexual Activity    Alcohol use: Yes     Alcohol/week: 0.0 - 1.0  "standard drinks    Drug use: No    Sexual activity: Never   Lifestyle    Physical activity:     Days per week: Not on file     Minutes per session: Not on file    Stress: Not on file   Relationships    Social connections:     Talks on phone: Not on file     Gets together: Not on file     Attends U[employer] service: Not on file     Active member of club or organization: Not on file     Attends meetings of clubs or organizations: Not on file     Relationship status: Not on file   Other Topics Concern    Not on file   Social History Narrative    Not on file      Imaging / Labs / Studies (reviewed on 3/5/2020):    Review of Systems:  Review of Systems   Constitutional: Negative for fever.   Eyes: Negative for blurred vision.   Respiratory: Negative for cough and wheezing.    Cardiovascular: Negative for chest pain and orthopnea.   Gastrointestinal: Negative for constipation, diarrhea, nausea and vomiting.   Genitourinary: Negative for dysuria.   Musculoskeletal: Positive for back pain.        Left leg pain   Skin: Negative for itching and rash.   Neurological: Positive for weakness.   Endo/Heme/Allergies: Does not bruise/bleed easily.       Physical Exam:  Ht 5' 6" (1.676 m)   Wt 96.4 kg (212 lb 8.4 oz)   BMI 34.30 kg/m²  (reviewed on 3/5/2020)\  General    Constitutional: She is oriented to person, place, and time. She appears well-developed and well-nourished.   HENT:   Head: Normocephalic and atraumatic.   Eyes: EOM are normal.   Neck: Neck supple.   Cardiovascular: Normal rate.    Pulmonary/Chest: Effort normal.   Abdominal: She exhibits no distension.   Neurological: She is alert and oriented to person, place, and time. No cranial nerve deficit.   Psychiatric: She has a normal mood and affect.     General Musculoskeletal Exam   Gait: antalgic     Back (L-Spine & T-Spine) / Neck (C-Spine) Exam     Comments:  Positive straight leg raise on the left in the L4-L5 distributions; negative straight leg raise on " the right; sensation intact to light touch bilateral lower extremities      Muscle Strength   Right Lower Extremity   Hip Flexion: 5/5   Quadriceps:  5/5   Hamstrin/5   EHL:  5/5  Left Lower Extremity   Hip Flexion: 5/5   Quadriceps:  5/5   Hamstrin/5   EHL:  5/5    Reflexes     Left Side  Quadriceps:  2+  Achilles:  2+  Ankle Clonus:  absent    Right Side   Quadriceps:  2+  Achilles:  2+  Ankle Clonus:  absent      Assessment  Lumbar Radiculopathy  Lumbar Degenerative Disc Disease    1. 75 y.o. year old patient with PMH of   Past Medical History:   Diagnosis Date    Anxiety     Cataract     Diabetes mellitus, type 2 few weeks ago     Pt says is was good    DM (diabetes mellitus) 20 years    BS didn't check 10/01/2019    Hyperlipidemia     Hypertension     Insomnia       presenting with pain located lumbar spine, left leg  2. Pain Generators / Etiology: Lumbar Radiculopathy, Lumbar Spondylosis and Lumbar Degenerative Disc Disease  3. Failed Meds (E- Effective, NE- Not Effective):  Gabapentin-effective, Cymbalta-effective, Voltaren-minimally effective, ibuprofen-effective  4. Physical Therapy - Completed in the Past  5. Psychological comorbidities - None  6. Anticoagulants / Antiplatelets: None     PLAN:  1. Medications:  Will provide short course of Lortab 5 mg tablets times approximately 30 days; 60 total tablets to be taken twice daily as needed; will also increase gabapentin 300 mg 3 times daily    2. PT - continue working physical therapy as tolerated  3. Psychological - none  4. Labs - obtain  none  5. Imaging - obtain lumbar MRI for evaluation of left lower extremity numbness and weakness; reviewed lumbar x-ray patient today in clinic  6. Interventions - schedule left L4 and L5 transforaminal epidural steroid injection  7. Referrals - none  8. Records - none  9. Follow up visit - follow up in clinic 6 weeks after the injection  10. Patient Questions - answered all of the patient's questions  regarding diagnosis, therapy, and treatment  11.  This condition does not require this patient to take time off of work    PARI Prather MD  Interventional Pain  [facility] [place]

## 2020-03-16 ENCOUNTER — TELEPHONE (OUTPATIENT)
Dept: FAMILY MEDICINE | Facility: CLINIC | Age: 75
End: 2020-03-16

## 2020-03-16 RX ORDER — METFORMIN HYDROCHLORIDE 1000 MG/1
TABLET ORAL
Qty: 180 TABLET | Refills: 0 | Status: SHIPPED | OUTPATIENT
Start: 2020-03-16 | End: 2020-05-20 | Stop reason: SDUPTHER

## 2020-03-16 RX ORDER — GABAPENTIN 300 MG/1
CAPSULE ORAL
Qty: 180 CAPSULE | Refills: 0 | Status: SHIPPED | OUTPATIENT
Start: 2020-03-16 | End: 2020-05-20 | Stop reason: SDUPTHER

## 2020-03-17 ENCOUNTER — TELEPHONE (OUTPATIENT)
Dept: INTERNAL MEDICINE | Facility: CLINIC | Age: 75
End: 2020-03-17

## 2020-03-17 NOTE — TELEPHONE ENCOUNTER
Pt is aware if she continue to need the Medication we may need to referral her to out side pain clinic pt wants to be called when she can be rescheduled for procedure.  Pt states medication is not working for her pain and she is taking one a day.  All questions answered at this time//dp

## 2020-03-17 NOTE — TELEPHONE ENCOUNTER
----- Message from Vinay Prather MD sent at 3/17/2020  2:05 PM CDT -----  Contact: PT   We did a one month supply, so she should still have two weeks left.  Let her know that If she wants to continue the medications for longer than that, we'll refer her to [facility].  ----- Message -----  From: Teresa Bullock MA  Sent: 3/17/2020   1:16 PM CDT  To: Vinay Prather MD    Pt stated she still have some Hydrocodone left however since her procedure has been canceled she don't think she will be able to make it with out since she needs to increase the dose due to pain    Please advise  ----- Message -----  From: Jodie Campos  Sent: 3/17/2020  11:39 AM CDT  To: Yamilka Mclean Staff    The patient is calling in regards to having a possible surgery date for 03/20/2020 and would like clarification due to the appointment not showing on her chart,please advise as soon as possible [phone] (home)

## 2020-03-18 ENCOUNTER — TELEPHONE (OUTPATIENT)
Dept: FAMILY MEDICINE | Facility: CLINIC | Age: 75
End: 2020-03-18

## 2020-03-18 ENCOUNTER — TELEPHONE (OUTPATIENT)
Dept: DIABETES | Facility: CLINIC | Age: 75
End: 2020-03-18

## 2020-03-18 DIAGNOSIS — E78.5 DM TYPE 2 WITH DIABETIC DYSLIPIDEMIA: Primary | ICD-10-CM

## 2020-03-18 DIAGNOSIS — E11.69 DM TYPE 2 WITH DIABETIC DYSLIPIDEMIA: Primary | ICD-10-CM

## 2020-03-18 NOTE — TELEPHONE ENCOUNTER
----- Message from Shanell Lopez MA sent at 3/18/2020  2:21 PM CDT -----  Patient has had a referral to see Diabetes since 11/2019 and she did not want to go and now she does. Can you please call patient and make her an appointment

## 2020-03-18 NOTE — TELEPHONE ENCOUNTER
Spoke with patient stated that she has not had any insulin in over a month and it was just sent on 3/14/20 she would like to get her insulin and retest after she has been on her insulin again what do you want her to do?

## 2020-03-18 NOTE — TELEPHONE ENCOUNTER
Spoke with pharmacy verified that the Levemir is only for 30 days that she needs to see Diabetes to get her blood sugar under control

## 2020-03-18 NOTE — TELEPHONE ENCOUNTER
We have been thru   this this rodeo before . See my  Telephone note from 3/16 . Ref to see DM in   And 1 box of insulin sent to local pharmacy

## 2020-03-18 NOTE — TELEPHONE ENCOUNTER
----- Message from Hanh Groves sent at 3/18/2020  2:48 PM CDT -----  Contact: Temo/[facility] pharm [phone]  Type:  Pharmacy Calling to Clarify an RX    Name of Caller:Temo  Pharmacy Name:[facility]  Prescription Name:[facility]   What do they need to clarify?: quantity   Best Call Back Number:[phone]  Additional Information:  States that pt will need 9 boxes for a 90 day suppl

## 2020-03-18 NOTE — TELEPHONE ENCOUNTER
----- Message from Leonarda Syed sent at 3/18/2020  2:11 PM CDT -----  Contact: pt  Pt calling to get lab results and can be reached at [phone]      Thanks,  Leonarda Syed

## 2020-03-19 RX ORDER — LIRAGLUTIDE 6 MG/ML
INJECTION SUBCUTANEOUS
Qty: 18 ML | Refills: 0 | Status: SHIPPED | OUTPATIENT
Start: 2020-03-19 | End: 2020-04-24 | Stop reason: SDUPTHER

## 2020-03-23 ENCOUNTER — TELEPHONE (OUTPATIENT)
Dept: FAMILY MEDICINE | Facility: CLINIC | Age: 75
End: 2020-03-23

## 2020-03-23 NOTE — TELEPHONE ENCOUNTER
----- Message from Tierney Butt LPN sent at 3/23/2020 11:13 AM CDT -----  Contact: pt      ----- Message -----  From: Annalisa Burrell  Sent: 3/23/2020  10:56 AM CDT  To: Stella Rodríguez Staff    Pt has been out of insulin for a month. And out of the second for the one for a day.  [facility] states she must be seen before it can be refilled.

## 2020-03-25 DIAGNOSIS — F51.01 PRIMARY INSOMNIA: ICD-10-CM

## 2020-03-26 RX ORDER — LORAZEPAM 1 MG/1
1 TABLET ORAL NIGHTLY
Qty: 90 TABLET | Refills: 0 | Status: SHIPPED | OUTPATIENT
Start: 2020-03-26

## 2020-03-27 ENCOUNTER — TELEPHONE (OUTPATIENT)
Dept: PAIN MEDICINE | Facility: CLINIC | Age: 75
End: 2020-03-27

## 2020-03-27 DIAGNOSIS — M54.16 LUMBAR RADICULOPATHY: ICD-10-CM

## 2020-03-27 DIAGNOSIS — M47.816 LUMBAR SPONDYLOSIS: Primary | ICD-10-CM

## 2020-03-27 RX ORDER — HYDROCODONE BITARTRATE AND ACETAMINOPHEN 5; 325 MG/1; MG/1
1 TABLET ORAL EVERY 12 HOURS PRN
Qty: 60 TABLET | Refills: 0 | Status: SHIPPED | OUTPATIENT
Start: 2020-03-27 | End: 2020-04-26

## 2020-03-27 NOTE — TELEPHONE ENCOUNTER
Could not LVM it was full Return call to PT concerning RX refill. The refill has been approved at this time and can be picked up at pharmacy on records.  And referral was faxed with records//dp

## 2020-03-27 NOTE — TELEPHONE ENCOUNTER
----- Message from Vinay Prather MD sent at 3/27/2020  2:45 PM CDT -----  Gave her a refill, she can take this twice daily.  Also provided referral to [facility].  ----- Message -----  From: Teresa Bullock MA  Sent: 3/27/2020   1:36 PM CDT  To: Vinay Prather MD    Please advise  ----- Message -----  From: Janina You  Sent: 3/27/2020   1:16 PM CDT  To: Myla Mclean Staff    .Type:  RX Refill Request    Who Called: self  Refill or New Rx:refill  RX Name and Strength:norco 5-325  How is the patient currently taking it? (ex. 1XDay):1/day  Is this a 30 day or 90 day RX:30  Preferred Pharmacy with phone number:.  [facility]/pharmacy #[id number] - [place], [place] - [address] [address]  Phone: [phone] Fax: [phone]      Local or Mail Order:local  Ordering Provider:myla  Would the patient rather a call back or a response via [facility]? call  Best Call Back Number:.[phone]  Additional Information:

## 2020-04-07 ENCOUNTER — OFFICE VISIT (OUTPATIENT)
Dept: PAIN MEDICINE | Facility: CLINIC | Age: 75
End: 2020-04-07
Payer: MEDICARE

## 2020-04-07 ENCOUNTER — TELEPHONE (OUTPATIENT)
Dept: PAIN MEDICINE | Facility: CLINIC | Age: 75
End: 2020-04-07

## 2020-04-07 DIAGNOSIS — M47.816 LUMBAR SPONDYLOSIS: Primary | ICD-10-CM

## 2020-04-07 DIAGNOSIS — M54.16 LUMBAR RADICULOPATHY: ICD-10-CM

## 2020-04-07 PROCEDURE — 99442 PR PHYSICIAN TELEPHONE EVALUATION 11-20 MIN: ICD-10-PCS | Mod: 95,,, | Performed by: PAIN MEDICINE

## 2020-04-07 PROCEDURE — 99442 PR PHYSICIAN TELEPHONE EVALUATION 11-20 MIN: CPT | Mod: 95,,, | Performed by: PAIN MEDICINE

## 2020-04-07 NOTE — TELEPHONE ENCOUNTER
----- Message from Sho Elaine sent at 4/7/2020  9:22 AM CDT -----  Contact: self  Pt requesting a call back to know if her prescription for HYDROcodone-acetaminophen (NORCO) 5-325 mg per tablet can be sent to her local pharmacy listed below. Pt left before she could get her prescription. Please call pt back at [phone]        [facility] [address]  [place], [place]  Phone [phone]

## 2020-04-07 NOTE — PATIENT INSTRUCTIONS
-will increase gabapentin 600 mg 3 times daily  -will continue physical therapy exercises  -continue heating pads ankle packs to low back and left leg  -consider using topical agent  -follow up in clinic in 6 weeks

## 2020-04-07 NOTE — PROGRESS NOTES
Chronic Pain-Telephone-Established Note (Follow up visit)   The patient location is: [place] (left several weeks ago to go stay with her sister)   The chief complaint leading to consultation is:  Back and left leg pain   Visit type: Virtual visit with synchronous audio   Total time spent with patient:  12 min 1 sec   Each patient to whom he or she provides medical services by telemedicine is: (1) informed of the relationship between the physician and patient and the respective role of any other health care provider with respect to management of the patient; and (2) notified that he or she may decline to receive medical services by telemedicine and may withdraw from such care at any time.   Notes:   SUBJECTIVE:   Tamara Barriga presents tele-medicine appointment for a follow-up appointment for low back and left leg pain. Since the last visit, Tamara Barriga states the pain has been persistant. Current pain intensity is 9/10.  She continues to have left leg and low back pain with the left leg being worse than the back.  Today she reports the left leg is a 9/10 in the low back as an 8/10.  She has been taking gabapentin 300 mg 3 times daily which she has found to be very helpful.  She finds her symptoms worse with activity and somewhat improved rest.  Gabapentin does lower her to have fairly good sleep at night.  She did leave [place] several weeks ago to go stay with her sister in [place] and has not returned due to the corona virus.  At her last clinic visit we prescribed Lortab for her however she has not  the medication due to being out of state.  She wonders if her younger sister will be able to pick the medication up the pharmacy and then mail it to her.  She has been physical therapy the past and continues to perform physical therapy exercises on a daily basis.  She has also not been able to obtain lumbar MRI at this time.  Pain Medications:   - Opioids:  None   - NSAIDs:  None   -  Anti-Depressants:  Cymbalta   - Anti-Convulsants:  Gabapentin   - Others:  None         Physical Therapy/Home Exercise: yes    report: Reviewed and consistent with medication use as prescribed.   Pain Procedures:  None   Imaging:  Lumbar x-ray   Past Medical History:   Diagnosis Date    Anxiety     Cataract     Diabetes mellitus, type 2 few weeks ago     Pt says is was good    DM (diabetes mellitus) 20 years    BS didn't check 10/01/2019    Hyperlipidemia     Hypertension     Insomnia       Past Surgical History:   Procedure Laterality Date    BACK SURGERY      BREAST CYST EXCISION Bilateral     2007    CATARACT EXTRACTION W/  INTRAOCULAR LENS IMPLANT Right 08/15/2018    CATARACT EXTRACTION W/  INTRAOCULAR LENS IMPLANT Left 01/02/2019    CHOLECYSTECTOMY      HYSTERECTOMY      LIVER BIOPSY      OOPHORECTOMY      TOTAL REDUCTION MAMMOPLASTY Bilateral 2007      Social History     Socioeconomic History    Marital status:      Spouse name: Not on file    Number of children: Not on file    Years of education: Not on file    Highest education level: Not on file   Occupational History    Not on file   Social Needs    Financial resource strain: Not on file    Food insecurity:     Worry: Not on file     Inability: Not on file    Transportation needs:     Medical: Not on file     Non-medical: Not on file   Tobacco Use    Smoking status: Never Smoker    Smokeless tobacco: Never Used   Substance and Sexual Activity    Alcohol use: Yes     Alcohol/week: 0.0 - 1.0 standard drinks    Drug use: No    Sexual activity: Never   Lifestyle    Physical activity:     Days per week: Not on file     Minutes per session: Not on file    Stress: Not on file   Relationships    Social connections:     Talks on phone: Not on file     Gets together: Not on file     Attends Buddhism service: Not on file     Active member of club or organization: Not on file     Attends meetings of clubs or organizations:  Not on file     Relationship status: Not on file   Other Topics Concern    Not on file   Social History Narrative    Not on file      Family History   Problem Relation Age of Onset    Cancer Daughter         Breast Ca     Breast cancer Daughter     Breast cancer Sister       Review of patient's allergies indicates:  No Known Allergies   Current Outpatient Medications   Medication Sig    alendronate (FOSAMAX) 70 MG tablet 1 po weekly in AM on empty stomach. Do not consume food or lie down for at least 30 minutes afterwards.    amLODIPine (NORVASC) 5 MG tablet TAKE 1 TABLET ONCE DAILY    atenolol (TENORMIN) 100 MG tablet TAKE 1 TABLET BY MOUTH EVERY DAY    atorvastatin (LIPITOR) 80 MG tablet TAKE 1 TABLET BY MOUTH EVERY DAY    DULoxetine (CYMBALTA) 60 MG capsule TAKE 1 CAPSULE BY MOUTH EVERY DAY    ergocalciferol (VITAMIN D2) 50,000 unit Cap TAKE ONE CAPSULE BY MOUTH ONE TIME PER WEEK    gabapentin (NEURONTIN) 300 MG capsule TAKE 1 CAPSULE BY MOUTH TWICE A DAY    hydroCHLOROthiazide (MICROZIDE) 12.5 mg capsule Take 1 capsule (12.5 mg total) by mouth once daily.    HYDROcodone-acetaminophen (NORCO) 5-325 mg per tablet Take 1 tablet by mouth every 12 (twelve) hours as needed for Pain.    insulin aspart U-100 (NOVOLOG FLEXPEN U-100 INSULIN) 100 unit/mL (3 mL) InPn pen 3 units sq  before breakfast ,25 units sq  before lunch, 3 units sq  before dinner    insulin detemir U-100 (LEVEMIR FLEXTOUCH U-100 INSULN) 100 unit/mL (3 mL) SubQ InPn pen INJECT 75 UNITS UNDER THE SKIN TWICE DAILY    LORazepam (ATIVAN) 1 MG tablet Take 1 tablet (1 mg total) by mouth every evening.    metFORMIN (GLUCOPHAGE) 1000 MG tablet TAKE 1 TABLET BY MOUTH TWICE A DAY WITH MEALS    multivitamin with minerals (HAIR,SKIN AND NAILS) tablet Take 1 tablet by mouth once daily.    omeprazole (PRILOSEC) 20 MG capsule Take 1 capsule (20 mg total) by mouth once daily.    pen needle, diabetic (PEN NEEDLE MISC) by Misc.(Non-Drug; Combo  "Route) route.    pen needle, diabetic 30 gauge x 1/3" Ndle USE 2 TIMES A DAY    telmisartan-hydrochlorothiazide (MICARDIS HCT) 40-12.5 mg per tablet TAKE 1 TABLET BY MOUTH EVERY DAY    TRUETRACK TEST Strp USE TO TEST BLOOD SUGAR 4 TIMES DAILY BEFORE MEALS AND NIGHTLY    VICTOZA 2-DANNY 0.6 mg/0.1 mL (18 mg/3 mL) PnIj INJECT 1.2MG SUBCUTANEOUSLYONCE DAILY     No current facility-administered medications for this visit.       REVIEW OF SYSTEMS:   GENERAL: No weight loss, malaise or fevers.   HEENT: No recent changes in vision or hearing   NECK: Negative for lumps, no difficulty with swallowing.   RESPIRATORY: Negative for cough, wheezing or shortness of breath, patient denies any recent URI.   CARDIOVASCULAR: Negative for chest pain, leg swelling or palpitations.   GI: Negative for abdominal discomfort, blood in stools or black stools or change in bowel habits.   MUSCULOSKELETAL: See HPI.   SKIN: Negative for lesions, rash, and itching.   PSYCH: No mood disorder or recent psychosocial stressors. Patients sleep is not disturbed secondary to pain.   HEMATOLOGY/LYMPHOLOGY: Negative for prolonged bleeding, bruising easily or swollen nodes. Patient is not currently taking any anti-coagulants   NEURO: No history of headaches, syncope, paralysis, seizures or tremors.   All other reviewed and negative other than HPI.   OBJECTIVE:   Psych:  Affect normal  Respiratory:  Nonlabored breathing    ASSESSMENT: 75 y.o. year old female with low back and left leg pain, consistent with   Lumbar spondylosis  Lumbar radiculopathy  PLAN:   -will continue gabapentin and increase to 600 mg 3 times daily if tolerated  -continue physical therapy exercises as tolerated  -recommend using heat/cold packs  -recommended over-the-counter topical agent to apply to low back and left leg  The above plan and management options were discussed at length with patient. Patient is in agreement with the above and verbalized understanding. It will be " communicated with the referring physician via electronic record, fax, or mail.   Vinay Prather   04/07/2020

## 2020-04-21 DIAGNOSIS — I10 ESSENTIAL HYPERTENSION: ICD-10-CM

## 2020-04-21 RX ORDER — TELMISARTAN AND HYDROCHLORTHIAZIDE 40; 12.5 MG/1; MG/1
TABLET ORAL
Qty: 90 TABLET | Refills: 0 | OUTPATIENT
Start: 2020-04-21

## 2020-04-21 RX ORDER — ATORVASTATIN CALCIUM 80 MG/1
TABLET, FILM COATED ORAL
Qty: 90 TABLET | Refills: 0 | OUTPATIENT
Start: 2020-04-21

## 2020-04-21 RX ORDER — AMLODIPINE BESYLATE 5 MG/1
TABLET ORAL
Qty: 90 TABLET | Refills: 0 | OUTPATIENT
Start: 2020-04-21

## 2020-04-21 RX ORDER — DULOXETIN HYDROCHLORIDE 60 MG/1
CAPSULE, DELAYED RELEASE ORAL
Qty: 90 CAPSULE | Refills: 0 | OUTPATIENT
Start: 2020-04-21

## 2020-04-21 RX ORDER — ATENOLOL 100 MG/1
TABLET ORAL
Qty: 90 TABLET | Refills: 0 | OUTPATIENT
Start: 2020-04-21

## 2020-04-24 ENCOUNTER — TELEPHONE (OUTPATIENT)
Dept: PAIN MEDICINE | Facility: CLINIC | Age: 75
End: 2020-04-24

## 2020-04-24 DIAGNOSIS — F51.01 PRIMARY INSOMNIA: ICD-10-CM

## 2020-04-24 NOTE — TELEPHONE ENCOUNTER
----- Message from Norberto Rick sent at 4/24/2020  3:42 PM CDT -----  Contact: pt  Pt called and stated the pharmacy sent over a refill authorization rose marie her diabetic medication     Pt can be reached at [phone]

## 2020-04-24 NOTE — TELEPHONE ENCOUNTER
----- Message from Norberto Rick sent at 4/24/2020  3:49 PM CDT -----  Contact: pt  Pt called and stated she is having horrible pain in her left leg.     Pt stated the pain goes from her leg to her  groin , back and hip    Pt can be reached at [phone]

## 2020-04-24 NOTE — TELEPHONE ENCOUNTER
----- Message from Norberto Rick sent at 4/24/2020  3:42 PM CDT -----  Contact: pt  Pt called and stated the pharmacy sent over a refill authorization rose marie her diabetic medication     Pt can be reached at [phone]

## 2020-04-27 RX ORDER — INSULIN DETEMIR 100 [IU]/ML
INJECTION, SOLUTION SUBCUTANEOUS
Qty: 90 ML | Refills: 0 | Status: SHIPPED | OUTPATIENT
Start: 2020-04-27 | End: 2020-05-20 | Stop reason: SDUPTHER

## 2020-04-27 RX ORDER — LORAZEPAM 1 MG/1
TABLET ORAL
Qty: 90 TABLET | Refills: 0 | OUTPATIENT
Start: 2020-04-27

## 2020-05-04 ENCOUNTER — TELEPHONE (OUTPATIENT)
Dept: PAIN MEDICINE | Facility: CLINIC | Age: 75
End: 2020-05-04

## 2020-05-05 ENCOUNTER — TELEPHONE (OUTPATIENT)
Dept: PAIN MEDICINE | Facility: CLINIC | Age: 75
End: 2020-05-05

## 2020-05-05 NOTE — TELEPHONE ENCOUNTER
----- Message from Hailey Martinez sent at 5/5/2020 12:33 PM CDT -----  Contact: Patient  .Type:  RX Refill Request    Who Called: Patient  Refill or New Rx:Refill   RX Name and Strength:Hydrocodone 5-325 mg  How is the patient currently taking it? (ex. 1XDay):  Is this a 30 day or 90 day :  Patient's Preferred Pharmacy:   .  [facility]/pharmacy #[id number] - [place], [place] - [address]  [address] [address]  Phone: [phone] Fax: [phone]    Local or Mail Order:Local  Ordering Provider: Dr. Prather  Would the patient rather a call back or a response via [facility]? Call  Best Call Back Number: [phone] (work)    Additional Information: Patient would like a call back concerning getting a recommendation for a pain management doctor in [place], she is in the process of moving.

## 2020-05-05 NOTE — TELEPHONE ENCOUNTER
LVM for pt to call her insurance and see what pain management doctors are covered in the [place] area//dp

## 2020-05-06 ENCOUNTER — TELEPHONE (OUTPATIENT)
Dept: PAIN MEDICINE | Facility: CLINIC | Age: 75
End: 2020-05-06

## 2020-05-06 NOTE — TELEPHONE ENCOUNTER
LVM that at this time the requested RX will not be filled at this time as this was a one time medication. There was a referral placed to Dr. Manzano in March to establish care.//dp

## 2020-05-06 NOTE — TELEPHONE ENCOUNTER
----- Message from Vinay Prather MD sent at 5/6/2020  2:53 PM CDT -----  Contact: pt  This was a one time prescription as we had discussed during the clinic visit.  ----- Message -----  From: Teresa Bullock MA  Sent: 5/6/2020   2:13 PM CDT  To: Vinay Prather MD    Will you be able to refill her Hydrocodone     Please advise  ----- Message -----  From: Annalisa Burrell  Sent: 5/6/2020  12:04 PM CDT  To: Yamilka Mclean Staff    Pt calling on the status of her refill request.

## 2020-05-07 ENCOUNTER — TELEPHONE (OUTPATIENT)
Dept: PAIN MEDICINE | Facility: CLINIC | Age: 75
End: 2020-05-07

## 2020-05-07 NOTE — TELEPHONE ENCOUNTER
----- Message from Indra Joe sent at 5/7/2020  1:54 PM CDT -----  Contact: pt   Pt states she has called and left multiple messages for a cb, in reference to pain issue she is having and trying to get a script for hydrocodone filled and get an appt for injection     [phone]            [id number] - [place], [place] - [address] AT [address]  [place] [address]  Phone: [phone] Fax: [phone]

## 2020-05-07 NOTE — TELEPHONE ENCOUNTER
Return call mail box is full if pt call back please transfer to the department to address concerns//dp

## 2020-05-12 ENCOUNTER — TELEPHONE (OUTPATIENT)
Dept: PAIN MEDICINE | Facility: CLINIC | Age: 75
End: 2020-05-12

## 2020-05-12 NOTE — TELEPHONE ENCOUNTER
LVM to get pt scheduled for procedure pt is not returning any call at this time as several message have been left at this time//dp

## 2020-05-13 ENCOUNTER — TELEPHONE (OUTPATIENT)
Dept: PAIN MEDICINE | Facility: CLINIC | Age: 75
End: 2020-05-13

## 2020-05-13 NOTE — TELEPHONE ENCOUNTER
left message on voicemail to call back at earliest convenience to schedule lumbar tf kerwin per .//rocco

## 2020-05-19 ENCOUNTER — TELEPHONE (OUTPATIENT)
Dept: PAIN MEDICINE | Facility: CLINIC | Age: 75
End: 2020-05-19

## 2020-05-20 ENCOUNTER — TELEPHONE (OUTPATIENT)
Dept: PAIN MEDICINE | Facility: CLINIC | Age: 75
End: 2020-05-20

## 2020-05-20 ENCOUNTER — OFFICE VISIT (OUTPATIENT)
Dept: FAMILY MEDICINE | Facility: CLINIC | Age: 75
End: 2020-05-20
Attending: FAMILY MEDICINE
Payer: MEDICARE

## 2020-05-20 ENCOUNTER — TELEPHONE (OUTPATIENT)
Dept: FAMILY MEDICINE | Facility: CLINIC | Age: 75
End: 2020-05-20

## 2020-05-20 VITALS
SYSTOLIC BLOOD PRESSURE: 138 MMHG | HEIGHT: 66 IN | TEMPERATURE: 98 F | HEART RATE: 90 BPM | WEIGHT: 201.38 LBS | BODY MASS INDEX: 32.36 KG/M2 | DIASTOLIC BLOOD PRESSURE: 86 MMHG | OXYGEN SATURATION: 95 %

## 2020-05-20 DIAGNOSIS — E11.69 DM TYPE 2 WITH DIABETIC DYSLIPIDEMIA: Primary | ICD-10-CM

## 2020-05-20 DIAGNOSIS — I10 ESSENTIAL HYPERTENSION: ICD-10-CM

## 2020-05-20 DIAGNOSIS — F32.5 MAJOR DEPRESSIVE DISORDER WITH SINGLE EPISODE, IN FULL REMISSION: ICD-10-CM

## 2020-05-20 DIAGNOSIS — E78.5 DM TYPE 2 WITH DIABETIC DYSLIPIDEMIA: Primary | ICD-10-CM

## 2020-05-20 LAB
ALBUMIN/CREAT UR: 86.5 UG/MG (ref 0–30)
CREAT UR-MCNC: 215 MG/DL (ref 15–325)
MICROALBUMIN UR DL<=1MG/L-MCNC: 186 UG/ML

## 2020-05-20 PROCEDURE — 82043 UR ALBUMIN QUANTITATIVE: CPT

## 2020-05-20 PROCEDURE — 99999 PR PBB SHADOW E&M-EST. PATIENT-LVL III: ICD-10-PCS | Mod: PBBFAC,,, | Performed by: FAMILY MEDICINE

## 2020-05-20 PROCEDURE — 99999 PR PBB SHADOW E&M-EST. PATIENT-LVL III: CPT | Mod: PBBFAC,,, | Performed by: FAMILY MEDICINE

## 2020-05-20 PROCEDURE — 99213 OFFICE O/P EST LOW 20 MIN: CPT | Mod: PBBFAC,PO | Performed by: FAMILY MEDICINE

## 2020-05-20 PROCEDURE — 99214 OFFICE O/P EST MOD 30 MIN: CPT | Mod: S$PBB,,, | Performed by: FAMILY MEDICINE

## 2020-05-20 PROCEDURE — 99214 PR OFFICE/OUTPT VISIT, EST, LEVL IV, 30-39 MIN: ICD-10-PCS | Mod: S$PBB,,, | Performed by: FAMILY MEDICINE

## 2020-05-20 RX ORDER — METFORMIN HYDROCHLORIDE 1000 MG/1
1000 TABLET ORAL 2 TIMES DAILY WITH MEALS
Qty: 180 TABLET | Refills: 0 | Status: SHIPPED | OUTPATIENT
Start: 2020-05-20

## 2020-05-20 RX ORDER — GABAPENTIN 300 MG/1
300 CAPSULE ORAL 2 TIMES DAILY
Qty: 180 CAPSULE | Refills: 0 | Status: SHIPPED | OUTPATIENT
Start: 2020-05-20

## 2020-05-20 RX ORDER — DULOXETIN HYDROCHLORIDE 60 MG/1
60 CAPSULE, DELAYED RELEASE ORAL DAILY
Qty: 90 CAPSULE | Refills: 0 | Status: SHIPPED | OUTPATIENT
Start: 2020-05-20

## 2020-05-20 RX ORDER — ATENOLOL 100 MG/1
100 TABLET ORAL DAILY
Qty: 90 TABLET | Refills: 0 | Status: SHIPPED | OUTPATIENT
Start: 2020-05-20

## 2020-05-20 RX ORDER — TELMISARTAN AND HYDROCHLORTHIAZIDE 40; 12.5 MG/1; MG/1
1 TABLET ORAL DAILY
Qty: 90 TABLET | Refills: 0 | Status: SHIPPED | OUTPATIENT
Start: 2020-05-20

## 2020-05-20 RX ORDER — INSULIN ASPART 100 [IU]/ML
INJECTION, SOLUTION INTRAVENOUS; SUBCUTANEOUS
Qty: 9 BOX | Refills: 0 | Status: SHIPPED | OUTPATIENT
Start: 2020-05-20

## 2020-05-20 RX ORDER — HYDROCHLOROTHIAZIDE 12.5 MG/1
12.5 CAPSULE ORAL DAILY
Qty: 90 CAPSULE | Refills: 0 | Status: SHIPPED | OUTPATIENT
Start: 2020-05-20

## 2020-05-20 RX ORDER — ATORVASTATIN CALCIUM 80 MG/1
80 TABLET, FILM COATED ORAL DAILY
Qty: 90 TABLET | Refills: 0 | Status: SHIPPED | OUTPATIENT
Start: 2020-05-20

## 2020-05-20 RX ORDER — AMLODIPINE BESYLATE 5 MG/1
5 TABLET ORAL DAILY
Qty: 90 TABLET | Refills: 0 | Status: SHIPPED | OUTPATIENT
Start: 2020-05-20

## 2020-05-20 NOTE — TELEPHONE ENCOUNTER
I refill her meds. I don't prescribe  Hydrocodone . Must  see another pain doctor if meds she is requesting are for pain

## 2020-05-20 NOTE — TELEPHONE ENCOUNTER
----- Message from Gisel Mcallister sent at 5/20/2020  1:14 PM CDT -----  Contact: Pt  Pt is requesting call back in regards to questions about getting medication for back pain          Pls call back at [phone]

## 2020-05-20 NOTE — TELEPHONE ENCOUNTER
----- Message from Anahi Edwards MA sent at 5/20/2020  3:36 PM CDT -----  Contact: self [phone]      ----- Message -----  From: Jaycob Tamez  Sent: 5/20/2020   1:59 PM CDT  To: Stella Rodríguez Staff    Pt called to consult with nurse about Dr Hickman can refill her meds. Pt states she is no longer a pt Dr Prather. Please call pt back at [phone]. Thank tp

## 2020-05-20 NOTE — PROGRESS NOTES
Subjective:       Patient ID: Tamara Barriga is a 75 y.o. female.    Chief Complaint: Medication Refill    75 y old female  here for f/u on t2DM, htn , stage 3 ckd , dlp, depression and o penia .   Exercises : pt for Lumbar ddd , Ophthalmology : seen By Dr Farfan last fall  . She has not monitored BS in months . She packed her meter . Will move to [place] next week . . Using insulin and victoza as prescribed . No falls , no fractures. Stable depression     Review of Systems   Constitutional: Negative.    HENT: Negative.    Eyes: Negative.    Respiratory: Negative.    Cardiovascular: Negative.    Gastrointestinal: Negative.    Genitourinary: Negative.    Musculoskeletal: Negative.    Skin: Negative.    Hematological: Negative.        Objective:      Physical Exam   Constitutional: She is oriented to person, place, and time. No distress.   HENT:   Head: Normocephalic and atraumatic.   Right Ear: External ear normal.   Left Ear: External ear normal.   Mouth/Throat: No oropharyngeal exudate.   Eyes: Pupils are equal, round, and reactive to light. Conjunctivae and EOM are normal. Right eye exhibits no discharge. Left eye exhibits no discharge. No scleral icterus.   Neck: Normal range of motion. Neck supple. No JVD present. No tracheal deviation present. No thyromegaly present.   Cardiovascular: Normal rate, regular rhythm and normal heart sounds. Exam reveals no gallop and no friction rub.   No murmur heard.  Pulmonary/Chest: Effort normal and breath sounds normal. No stridor. No respiratory distress. She has no wheezes. She has no rales. She exhibits no tenderness.   Abdominal: Soft. Bowel sounds are normal. She exhibits no distension. There is no tenderness. There is no rebound and no guarding.   Musculoskeletal: Normal range of motion.   Lymphadenopathy:     She has no cervical adenopathy.   Neurological: She is alert and oriented to person, place, and time.   Skin: Skin is warm and dry. She is not diaphoretic.    Psychiatric: She has a normal mood and affect. Her behavior is normal. Judgment and thought content normal.       Assessment:      Tamara was seen today for medication refill.    Diagnoses and all orders for this visit:    DM type 2 with diabetic dyslipidemia  -     Microalbumin/creatinine urine ratio  -     Lipid Panel; Future    Essential hypertension  -     amLODIPine (NORVASC) 5 MG tablet; Take 1 tablet (5 mg total) by mouth once daily.    Major depressive disorder with single episode, in full remission    Other orders  -     atenoloL (TENORMIN) 100 MG tablet; Take 1 tablet (100 mg total) by mouth once daily.  -     atorvastatin (LIPITOR) 80 MG tablet; Take 1 tablet (80 mg total) by mouth once daily.  -     DULoxetine (CYMBALTA) 60 MG capsule; Take 1 capsule (60 mg total) by mouth once daily.  -     gabapentin (NEURONTIN) 300 MG capsule; Take 1 capsule (300 mg total) by mouth 2 (two) times daily.  -     hydroCHLOROthiazide (MICROZIDE) 12.5 mg capsule; Take 1 capsule (12.5 mg total) by mouth once daily.  -     liraglutide 0.6 mg/0.1 mL, 18 mg/3 mL, subq PNIJ (VICTOZA 2-DANNY) 0.6 mg/0.1 mL (18 mg/3 mL) PnIj; INJECT 1.2MG SUBCUTANEOUSLYONCE DAILY  -     metFORMIN (GLUCOPHAGE) 1000 MG tablet; Take 1 tablet (1,000 mg total) by mouth 2 (two) times daily with meals.  -     telmisartan-hydrochlorothiazide (MICARDIS HCT) 40-12.5 mg per tablet; Take 1 tablet by mouth once daily.  -     insulin detemir U-100 (LEVEMIR FLEXTOUCH U-100 INSULN) 100 unit/mL (3 mL) SubQ InPn pen; INJECT 75 UNITS            SUBCUTANEOUSLY TWO TIMES A DAY  -     insulin aspart U-100 (NOVOLOG FLEXPEN U-100 INSULIN) 100 unit/mL (3 mL) InPn pen; 3 units sq  before breakfast ,25 units sq  before lunch, 3 units sq  before dinner      Plan:     Monitor BS pre meal and qhs   Controlled. Cont med   Stabel . Med rf

## 2020-05-20 NOTE — TELEPHONE ENCOUNTER
Return call to pt at this time Provider will not be able to refill requested medication at this time.  Offered to get pt scheduled for Left L4/5 L5/S1 lumbar TF LUIS at this time pt [place] the [place] and would need procedure to be scheduled right away however pt  7 DAYS prior to procedure:  Stop taking Plavix/Clopridogrel/aspirin/Advil/Ibuprofen/Excedrin  STOP ALL SUPPLEMENT AND VITAMINS  And pt will also need Covid test scheduled. Pt was also made aware that her A1C was high and if we scheduled her procedure and the day of procedure if her numbers was high she would not be able to move forward with procedure.  At this time pt got upset and stated that the provider Dr. Prather has not done anything to help her with her pain he should have to walk around in this pain. All concerns have been forward to Dr. Prather at this time.//dp

## 2020-06-10 ENCOUNTER — TELEPHONE (OUTPATIENT)
Dept: FAMILY MEDICINE | Facility: CLINIC | Age: 75
End: 2020-06-10

## 2020-06-10 NOTE — TELEPHONE ENCOUNTER
----- Message from Gisell Vazquez sent at 6/10/2020 11:06 AM CDT -----  Pt is requesting a call back regarding medical records be faxed to an new location. If needed please call back at [phone] Dr.Andrew YOGI Stevenson Fax number is [phone]

## 2020-06-16 DIAGNOSIS — F51.01 PRIMARY INSOMNIA: ICD-10-CM

## 2020-06-29 ENCOUNTER — DOCUMENTATION ONLY (OUTPATIENT)
Dept: REHABILITATION | Facility: HOSPITAL | Age: 75
End: 2020-06-29

## 2020-06-29 NOTE — PROGRESS NOTES
Outpatient Therapy Discharge Summary     Name: Tamara Barriga  [facility] Number: [id number]    Therapy Diagnosis:        Encounter Diagnoses   Name Primary?    Chronic left-sided low back pain with left-sided sciatica      Stiffness in joint      Posture imbalance      Weakness of left leg       Physician: Vinay Prather MD DC Date: 6/29/2020     Physician Orders: PT Eval and Treat   Medical Diagnosis from Referral:   M47.816 (ICD-10-CM) - Lumbar spondylosis   M54.16 (ICD-10-CM) - Lumbar radiculopathy         Evaluation Date: 1/6/2020  Authorization Period Expiration: 1/1/21  Plan of Care Expiration: 2/20/20  Visit # / Visits authorized: 9/ 12      Date of Last visit: 2/25/20  Total Visits Received: 10  Cancelled Visits: /No Show Visits: multiple due to covid    Assessment        Discharge reason: Patient has not attended therapy since 2/25/20    Plan   This patient is discharged from Physical Therapy

## 2020-08-28 ENCOUNTER — PATIENT OUTREACH (OUTPATIENT)
Dept: ADMINISTRATIVE | Facility: HOSPITAL | Age: 75
End: 2020-08-28

## 2020-11-18 ENCOUNTER — PES CALL (OUTPATIENT)
Dept: ADMINISTRATIVE | Facility: CLINIC | Age: 75
End: 2020-11-18

## 2021-02-03 ENCOUNTER — PES CALL (OUTPATIENT)
Dept: ADMINISTRATIVE | Facility: CLINIC | Age: 76
End: 2021-02-03

## 2021-03-16 ENCOUNTER — TELEPHONE (OUTPATIENT)
Dept: ADMINISTRATIVE | Facility: HOSPITAL | Age: 76
End: 2021-03-16

## 2021-04-15 ENCOUNTER — TELEPHONE (OUTPATIENT)
Dept: ADMINISTRATIVE | Facility: HOSPITAL | Age: 76
End: 2021-04-15

## 2021-05-17 ENCOUNTER — PES CALL (OUTPATIENT)
Dept: ADMINISTRATIVE | Facility: CLINIC | Age: 76
End: 2021-05-17

## 2021-06-24 ENCOUNTER — TELEPHONE (OUTPATIENT)
Dept: ADMINISTRATIVE | Facility: HOSPITAL | Age: 76
End: 2021-06-24

## 2021-07-30 ENCOUNTER — PES CALL (OUTPATIENT)
Dept: ADMINISTRATIVE | Facility: CLINIC | Age: 76
End: 2021-07-30

## 2021-08-04 ENCOUNTER — TELEPHONE (OUTPATIENT)
Dept: ADMINISTRATIVE | Facility: HOSPITAL | Age: 76
End: 2021-08-04

## 2021-08-10 ENCOUNTER — TELEPHONE (OUTPATIENT)
Dept: ADMINISTRATIVE | Facility: HOSPITAL | Age: 76
End: 2021-08-10

## 2021-09-28 ENCOUNTER — TELEPHONE (OUTPATIENT)
Dept: ADMINISTRATIVE | Facility: HOSPITAL | Age: 76
End: 2021-09-28

## 2022-01-20 ENCOUNTER — TELEPHONE (OUTPATIENT)
Dept: ADMINISTRATIVE | Facility: HOSPITAL | Age: 77
End: 2022-01-20
Payer: MEDICARE

## 2022-02-18 ENCOUNTER — TELEPHONE (OUTPATIENT)
Dept: ADMINISTRATIVE | Facility: HOSPITAL | Age: 77
End: 2022-02-18
Payer: MEDICARE

## 2022-03-22 ENCOUNTER — TELEPHONE (OUTPATIENT)
Dept: ADMINISTRATIVE | Facility: HOSPITAL | Age: 77
End: 2022-03-22
Payer: MEDICARE